# Patient Record
Sex: MALE | Race: WHITE | Employment: OTHER | URBAN - METROPOLITAN AREA
[De-identification: names, ages, dates, MRNs, and addresses within clinical notes are randomized per-mention and may not be internally consistent; named-entity substitution may affect disease eponyms.]

---

## 2018-08-27 ENCOUNTER — OFFICE VISIT (OUTPATIENT)
Dept: FAMILY MEDICINE | Facility: OTHER | Age: 68
End: 2018-08-27
Attending: NURSE PRACTITIONER
Payer: COMMERCIAL

## 2018-08-27 VITALS
BODY MASS INDEX: 30.15 KG/M2 | TEMPERATURE: 97.1 F | DIASTOLIC BLOOD PRESSURE: 70 MMHG | HEIGHT: 72 IN | WEIGHT: 222.6 LBS | SYSTOLIC BLOOD PRESSURE: 124 MMHG | RESPIRATION RATE: 16 BRPM | HEART RATE: 68 BPM | OXYGEN SATURATION: 97 %

## 2018-08-27 DIAGNOSIS — E78.5 HYPERLIPIDEMIA LDL GOAL <100: Primary | ICD-10-CM

## 2018-08-27 DIAGNOSIS — Z76.89 ENCOUNTER TO ESTABLISH CARE: ICD-10-CM

## 2018-08-27 DIAGNOSIS — K21.00 GASTROESOPHAGEAL REFLUX DISEASE WITH ESOPHAGITIS: ICD-10-CM

## 2018-08-27 DIAGNOSIS — F41.1 GENERALIZED ANXIETY DISORDER: ICD-10-CM

## 2018-08-27 DIAGNOSIS — Z79.899 ON STATIN THERAPY: ICD-10-CM

## 2018-08-27 PROCEDURE — 99203 OFFICE O/P NEW LOW 30 MIN: CPT | Performed by: NURSE PRACTITIONER

## 2018-08-27 RX ORDER — SIMVASTATIN 40 MG
TABLET ORAL
COMMUNITY
Start: 2018-06-14 | End: 2019-10-11

## 2018-08-27 RX ORDER — DIPHENOXYLATE HYDROCHLORIDE AND ATROPINE SULFATE 2.5; .025 MG/1; MG/1
TABLET ORAL
COMMUNITY
Start: 2005-06-10

## 2018-08-27 ASSESSMENT — PAIN SCALES - GENERAL: PAINLEVEL: NO PAIN (0)

## 2018-08-27 NOTE — PROGRESS NOTES
SUBJECTIVE:   Jim Brown is a 68 year old male who presents to clinic today for the following health issues:  Chief Complaint   Patient presents with     Establish Care     Jim presents today to [facility].  He had been following with [facility], his longtime provider retired; he has tried another and just was not comfortable.      He takes zocor for cholesterol, zantac at bedtime for GERD, aspirin and a multivit.      Consent obtained, [facility] medical record is reviewed and updated.  Labs are current, completed in May.      He is feeling well and does not have any new concerns today.       Problem list and histories reviewed & adjusted, as indicated.  Additional history: as documented    There is no problem list on file for this patient.    Past Surgical History:   Procedure Laterality Date     ABDOMEN SURGERY      hernia surgerys 3-4     CHOLECYSTECTOMY  2016    galbladder      ENT SURGERY      tonsilectomy when little        Social History   Substance Use Topics     Smoking status: Former Smoker     Packs/day: 1.00     Years: 20.00     Start date: 11/27/1971     Smokeless tobacco: Former User     Types: Chew     Alcohol use No     Family History   Problem Relation Age of Onset     Coronary Artery Disease Father      Other Cancer Brother          No current outpatient prescriptions on file.     Not on File  No lab results found.   BP Readings from Last 3 Encounters:   08/27/18 124/70    Wt Readings from Last 3 Encounters:   08/27/18 222 lb 9.6 oz (101 kg)                 Reviewed and updated as needed this visit by clinical staff  Tobacco  Allergies  Meds  Problems  Soc Hx      Reviewed and updated as needed this visit by Provider         ROS:  Constitutional, HEENT, cardiovascular, pulmonary, gi and gu systems are negative, except as otherwise noted.    OBJECTIVE:     /70 (BP Location: Right arm, Patient Position: Chair, Cuff Size: Adult Large)  Pulse 68  Temp 97.1  F (36.2  C) (Tympanic)  " Resp 16  Ht 5' 11.75\" (1.822 m)  Wt 222 lb 9.6 oz (101 kg)  SpO2 97%  BMI 30.4 kg/m2  Body mass index is 30.4 kg/(m^2).  GENERAL: healthy, alert and no distress  NECK: no adenopathy, no asymmetry, masses, or scars, thyroid normal to palpation and no carotid bruits  RESP: lungs clear to auscultation - no rales, rhonchi or wheezes  CV: regular rate and rhythm, normal S1 S2, no S3 or S4, no murmur, click or rub, no peripheral edema and peripheral pulses strong  MS: no gross musculoskeletal defects noted, no edema  PSYCH: mentation appears normal, affect normal/bright        ASSESSMENT/PLAN:       1. Encounter to establish care    2. Hyperlipidemia LDL goal <100  Labs are due in 3 months    3. On statin therapy    4. Gastroesophageal reflux disease with esophagitis    5. Generalized anxiety disorder        FUTURE APPOINTMENTS:       - Follow-up visit in 3 months or as needed for acute concerns    Izzy Wagner NP  [facility]    "

## 2018-08-27 NOTE — MR AVS SNAPSHOT
"              After Visit Summary   8/27/2018    Jim Brown    MRN: [id number]           Patient Information     Date Of Birth          1950        Visit Information        Provider Department      8/27/2018 9:00 AM Izzy Wagner NP [facility]        Today's Diagnoses     Hyperlipidemia LDL goal <100    -  1    Encounter to establish care        On statin therapy          Care Instructions      ASSESSMENT/PLAN:       1. Encounter to establish care    2. Hyperlipidemia LDL goal <100  Labs are due in 3 months    3. On statin therapy        FUTURE APPOINTMENTS:       - Follow-up visit in 3 months or as needed for acute concerns    Izzy Wagner NP  [facility]              Follow-ups after your visit        Follow-up notes from your care team     Return in about 3 months (around 11/27/2018), or if symptoms worsen or fail to improve.      Who to contact     If you have questions or need follow up information about today's clinic visit or your schedule please contact [facility] directly at [phone].  Normal or non-critical lab and imaging results will be communicated to you by GroupPricehart, letter or phone within 4 business days after the clinic has received the results. If you do not hear from us within 7 days, please contact the clinic through Screent or phone. If you have a critical or abnormal lab result, we will notify you by phone as soon as possible.  Submit refill requests through EnOcean or call your pharmacy and they will forward the refill request to us. Please allow 3 business days for your refill to be completed.          Additional Information About Your Visit        GroupPricehart Information     EnOcean lets you send messages to your doctor, view your test results, renew your prescriptions, schedule appointments and more. To sign up, go to [email or web address] . Click on \"Log in\" on the left side of the screen, which will take you to " "the Welcome page. Then click on \"Sign up Now\" on the right side of the page.     You will be asked to enter the access code listed below, as well as some personal information. Please follow the directions to create your username and password.     Your access code is: [email or web address]  Expires: 2018  8:45 AM     Your access code will  in 90 days. If you need help or a new code, please call your [place] clinic or [phone].        Care EveryWhere ID     This is your Care EveryWhere ID. This could be used by other organizations to access your [place] medical records  [id number]        Your Vitals Were     Pulse Temperature Respirations Height Pulse Oximetry BMI (Body Mass Index)    68 97.1  F (36.2  C) (Tympanic) 16 5' 11.75\" (1.822 m) 97% 30.4 kg/m2       Blood Pressure from Last 3 Encounters:   18 124/70    Weight from Last 3 Encounters:   18 222 lb 9.6 oz (101 kg)              Today, you had the following     No orders found for display       Primary Care Provider Fax #    Physician No Ref-Primary [phone]       No address on file        Equal Access to Services     VANNA PITTMAN : Hadii sanna Wagner, waaxda luryanadaha, qaybta kaalmada adeorvilleyada, amina spencer . So [facility] [phone].    ATENCIÓN: Si habla español, tiene a yost disposición servicios gratuitos de asistencia lingüística. Llame al [phone].    We comply with applicable federal civil rights laws and [place] laws. We do not discriminate on the basis of race, color, national origin, age, disability, sex, sexual orientation, or gender identity.            Thank you!     Thank you for choosing [facility]  for your care. Our goal is always to provide you with excellent care. Hearing back from our patients is one way we can continue to improve our services. Please take a few minutes to complete the written survey that you may receive in the mail after your visit with us. Thank " you!             Your Updated Medication List - Protect others around you: Learn how to safely use, store and throw away your medicines at www.disposemymeds.org.          This list is accurate as of 8/27/18  9:41 AM.  Always use your most recent med list.                   Brand Name Dispense Instructions for use Diagnosis    aspirin 81 MG EC tablet      Take 81 mg by mouth        MULTI-VITAMINS Tabs           ranitidine 300 MG tablet    ZANTAC     TAKE 1 TABLET EVERY EVENING. ACID REDUCER        simvastatin 40 MG tablet    ZOCOR     TAKE 1 TABLET BY MOUTH AT BEDTIME.

## 2018-08-27 NOTE — PATIENT INSTRUCTIONS
ASSESSMENT/PLAN:       1. Encounter to establish care    2. Hyperlipidemia LDL goal <100  Labs are due in 3 months    3. On statin therapy        FUTURE APPOINTMENTS:       - Follow-up visit in 3 months or as needed for acute concerns    Izzy Wagner NP  [facility]

## 2018-11-26 ENCOUNTER — OFFICE VISIT (OUTPATIENT)
Dept: FAMILY MEDICINE | Facility: OTHER | Age: 68
End: 2018-11-26
Attending: NURSE PRACTITIONER
Payer: COMMERCIAL

## 2018-11-26 VITALS
HEART RATE: 72 BPM | SYSTOLIC BLOOD PRESSURE: 114 MMHG | BODY MASS INDEX: 29.93 KG/M2 | OXYGEN SATURATION: 98 % | HEIGHT: 72 IN | DIASTOLIC BLOOD PRESSURE: 70 MMHG | RESPIRATION RATE: 16 BRPM | WEIGHT: 221 LBS

## 2018-11-26 DIAGNOSIS — Z12.11 ENCOUNTER FOR SCREENING COLONOSCOPY: ICD-10-CM

## 2018-11-26 DIAGNOSIS — Z13.6 ENCOUNTER FOR ABDOMINAL AORTIC ANEURYSM (AAA) SCREENING: ICD-10-CM

## 2018-11-26 DIAGNOSIS — Z79.899 ON STATIN THERAPY: ICD-10-CM

## 2018-11-26 DIAGNOSIS — K21.00 GASTROESOPHAGEAL REFLUX DISEASE WITH ESOPHAGITIS: ICD-10-CM

## 2018-11-26 DIAGNOSIS — L30.9 DERMATITIS: ICD-10-CM

## 2018-11-26 DIAGNOSIS — Z11.59 NEED FOR HEPATITIS C SCREENING TEST: ICD-10-CM

## 2018-11-26 DIAGNOSIS — E78.5 HYPERLIPIDEMIA LDL GOAL <100: Primary | ICD-10-CM

## 2018-11-26 LAB
ALBUMIN SERPL-MCNC: 3.8 G/DL (ref 3.4–5)
ALP SERPL-CCNC: 55 U/L (ref 40–150)
ALT SERPL W P-5'-P-CCNC: 17 U/L (ref 0–70)
ANION GAP SERPL CALCULATED.3IONS-SCNC: 7 MMOL/L (ref 3–14)
AST SERPL W P-5'-P-CCNC: 11 U/L (ref 0–45)
BILIRUB SERPL-MCNC: 0.3 MG/DL (ref 0.2–1.3)
BUN SERPL-MCNC: 14 MG/DL (ref 7–30)
CALCIUM SERPL-MCNC: 8.5 MG/DL (ref 8.5–10.1)
CHLORIDE SERPL-SCNC: 104 MMOL/L (ref 94–109)
CHOLEST SERPL-MCNC: 183 MG/DL
CO2 SERPL-SCNC: 26 MMOL/L (ref 20–32)
CREAT SERPL-MCNC: 0.85 MG/DL (ref 0.66–1.25)
GFR SERPL CREATININE-BSD FRML MDRD: 89 ML/MIN/1.7M2
GLUCOSE SERPL-MCNC: 66 MG/DL (ref 70–99)
HDLC SERPL-MCNC: 62 MG/DL
LDLC SERPL CALC-MCNC: 109 MG/DL
NONHDLC SERPL-MCNC: 121 MG/DL
POTASSIUM SERPL-SCNC: 3.9 MMOL/L (ref 3.4–5.3)
PROT SERPL-MCNC: 7.5 G/DL (ref 6.8–8.8)
SODIUM SERPL-SCNC: 137 MMOL/L (ref 133–144)
TRIGL SERPL-MCNC: 62 MG/DL
TSH SERPL DL<=0.005 MIU/L-ACNC: 1.72 MU/L (ref 0.4–4)

## 2018-11-26 PROCEDURE — 99214 OFFICE O/P EST MOD 30 MIN: CPT | Performed by: NURSE PRACTITIONER

## 2018-11-26 PROCEDURE — 84443 ASSAY THYROID STIM HORMONE: CPT | Performed by: NURSE PRACTITIONER

## 2018-11-26 PROCEDURE — 36415 COLL VENOUS BLD VENIPUNCTURE: CPT | Performed by: NURSE PRACTITIONER

## 2018-11-26 PROCEDURE — 80053 COMPREHEN METABOLIC PANEL: CPT | Performed by: NURSE PRACTITIONER

## 2018-11-26 PROCEDURE — 93000 ELECTROCARDIOGRAM COMPLETE: CPT | Performed by: INTERNAL MEDICINE

## 2018-11-26 PROCEDURE — 99000 SPECIMEN HANDLING OFFICE-LAB: CPT | Performed by: NURSE PRACTITIONER

## 2018-11-26 PROCEDURE — 86803 HEPATITIS C AB TEST: CPT | Mod: 90 | Performed by: NURSE PRACTITIONER

## 2018-11-26 PROCEDURE — 80061 LIPID PANEL: CPT | Performed by: NURSE PRACTITIONER

## 2018-11-26 RX ORDER — TRIAMCINOLONE ACETONIDE 1 MG/G
CREAM TOPICAL
Qty: 80 G | Refills: 0 | Status: SHIPPED | OUTPATIENT
Start: 2018-11-26 | End: 2020-08-25

## 2018-11-26 ASSESSMENT — ANXIETY QUESTIONNAIRES
1. FEELING NERVOUS, ANXIOUS, OR ON EDGE: NOT AT ALL
GAD7 TOTAL SCORE: 0
4. TROUBLE RELAXING: NOT AT ALL
3. WORRYING TOO MUCH ABOUT DIFFERENT THINGS: NOT AT ALL
7. FEELING AFRAID AS IF SOMETHING AWFUL MIGHT HAPPEN: NOT AT ALL
5. BEING SO RESTLESS THAT IT IS HARD TO SIT STILL: NOT AT ALL
2. NOT BEING ABLE TO STOP OR CONTROL WORRYING: NOT AT ALL
6. BECOMING EASILY ANNOYED OR IRRITABLE: NOT AT ALL
IF YOU CHECKED OFF ANY PROBLEMS ON THIS QUESTIONNAIRE, HOW DIFFICULT HAVE THESE PROBLEMS MADE IT FOR YOU TO DO YOUR WORK, TAKE CARE OF THINGS AT HOME, OR GET ALONG WITH OTHER PEOPLE: NOT DIFFICULT AT ALL

## 2018-11-26 ASSESSMENT — PAIN SCALES - GENERAL: PAINLEVEL: NO PAIN (0)

## 2018-11-26 ASSESSMENT — PATIENT HEALTH QUESTIONNAIRE - PHQ9: SUM OF ALL RESPONSES TO PHQ QUESTIONS 1-9: 8

## 2018-11-26 NOTE — PATIENT INSTRUCTIONS
ASSESSMENT/PLAN:       1. Hyperlipidemia LDL goal <100  chronic  - Lipid Profile  - TSH with free T4 reflex  - Comprehensive metabolic panel    2. Gastroesophageal reflux disease with esophagitis  Continue current plan    3. Need for hepatitis C screening test  routine  - Hepatitis C Screen Reflex to HCV RNA Quant and Genotype    4. Encounter for screening colonoscopy  routine  - GENERAL SURG ADULT REFERRAL  - EKG 12-lead complete w/read - (Clinic Performed)    5. On statin therapy  - Comprehensive metabolic panel    6. Encounter for abdominal aortic aneurysm (AAA) screening  routine  - [place] Aorta Medicare AAA Screening; Future    7. Dermatitis  - triamcinolone (KENALOG) 0.1 % cream; Apply sparingly to affected area three times daily as needed  Dispense: 80 g; Refill: 0    shingrix updated today      FUTURE APPOINTMENTS:       - Follow-up visit in 6 months or as needed for acute concerns.     Izzy Wagner NP  [facility]

## 2018-11-26 NOTE — MR AVS SNAPSHOT
After Visit Summary   11/26/2018    Jim Brown    MRN: [id number]           Patient Information     Date Of Birth          1950        Visit Information        Provider Department      11/26/2018 10:15 AM Izzy Wagner NP [facility]        Today's Diagnoses     Hyperlipidemia LDL goal <100    -  1    Gastroesophageal reflux disease with esophagitis        Need for hepatitis C screening test        Encounter for screening colonoscopy        On statin therapy        Encounter for abdominal aortic aneurysm (AAA) screening        Dermatitis          Care Instructions      ASSESSMENT/PLAN:       1. Hyperlipidemia LDL goal <100  chronic  - Lipid Profile  - TSH with free T4 reflex  - Comprehensive metabolic panel    2. Gastroesophageal reflux disease with esophagitis  Continue current plan    3. Need for hepatitis C screening test  routine  - Hepatitis C Screen Reflex to HCV RNA Quant and Genotype    4. Encounter for screening colonoscopy  routine  - GENERAL SURG ADULT REFERRAL  - EKG 12-lead complete w/read - (Clinic Performed)    5. On statin therapy  - Comprehensive metabolic panel    6. Encounter for abdominal aortic aneurysm (AAA) screening  routine  - US Aorta Medicare AAA Screening; Future    7. Dermatitis  - triamcinolone (KENALOG) 0.1 % cream; Apply sparingly to affected area three times daily as needed  Dispense: 80 g; Refill: 0    shingrix updated today      FUTURE APPOINTMENTS:       - Follow-up visit in 6 months or as needed for acute concerns.     Izzy Wagner NP  [facility]          Follow-ups after your visit        Additional Services     GENERAL SURG ADULT REFERRAL       Your provider has referred you to: [facility]    Please be aware that coverage of these services is subject to the terms and limitations of your health insurance plan.  Call member services at your health plan with any benefit or coverage  questions.      Please bring the following with you to your appointment:    (1) Any X-Rays, CTs or MRIs which have been performed.  Contact the facility where they were done to arrange for  prior to your scheduled appointment.   (2) List of current medications   (3) This referral request   (4) Any documents/labs given to you for this referral                  Follow-up notes from your care team     Return in about 6 months (around 5/26/2019), or if symptoms worsen or fail to improve.      Your next 10 appointments already scheduled     May 24, 2019  8:30 AM CDT   (Arrive by 8:15 AM)   SHORT with Izzy Wagner NP   [facility] ([facility] )    [address]  [facility] [address]   [phone]              Future tests that were ordered for you today     Open Future Orders        Priority Expected Expires Ordered    US Aorta Medicare AAA Screening Routine  11/26/2019 11/26/2018            Who to contact     If you have questions or need follow up information about today's clinic visit or your schedule please contact [facility] directly at [phone].  Normal or non-critical lab and imaging results will be communicated to you by MyChart, letter or phone within 4 business days after the clinic has received the results. If you do not hear from us within 7 days, please contact the clinic through MyChart or phone. If you have a critical or abnormal lab result, we will notify you by phone as soon as possible.  Submit refill requests through ScoreGrid or call your pharmacy and they will forward the refill request to us. Please allow 3 business days for your refill to be completed.          Additional Information About Your Visit        MyChart Information     ScoreGrid lets you send messages to your doctor, view your test results, renew your prescriptions, schedule appointments and more. To sign up, go to  [email or web address] . Click on \"Log in\" on the left side of the screen, which will take you to the Welcome page. Then click on \"Sign up Now\" on the right side of the page.     You will be asked to enter the access code listed below, as well as some personal information. Please follow the directions to create your username and password.     Your access code is: [email or web address]  Expires: 2019  8:17 AM     Your access code will  in 90 days. If you need help or a new code, please call your [place] clinic or [phone].        Care EveryWhere ID     This is your Care EveryWhere ID. This could be used by other organizations to access your [place] medical records  [id number]        Your Vitals Were     Pulse Respirations Height Pulse Oximetry BMI (Body Mass Index)       72 16 5' 11.75\" (1.822 m) 98% 30.18 kg/m2        Blood Pressure from Last 3 Encounters:   18 114/70   18 124/70    Weight from Last 3 Encounters:   18 221 lb (100.2 kg)   18 222 lb 9.6 oz (101 kg)              We Performed the Following     Comprehensive metabolic panel     EKG 12-lead complete w/read - (Clinic Performed)     GENERAL SURG ADULT REFERRAL     Hepatitis C Screen Reflex to HCV RNA Quant and Genotype     Lipid Profile     TSH with free T4 reflex          Today's Medication Changes          These changes are accurate as of 18 11:05 AM.  If you have any questions, ask your nurse or doctor.               Start taking these medicines.        Dose/Directions    triamcinolone 0.1 % cream   Commonly known as:  KENALOG   Used for:  Dermatitis   Started by:  Izzy Wagner NP        Apply sparingly to affected area three times daily as needed   Quantity:  80 g   Refills:  0            Where to get your medicines      These medications were sent to [facility] - [place], [place] - [address]  [address] [address]     Phone:  [phone]     triamcinolone 0.1 % cream                Primary " Care Provider Office Phone # Fax #    Izzy SORIA JUMANA Wagner [phone] [address] [facility] [address]        Equal Access to Services     AL[place] PITTMAN : Hadii aad ku hadalizao Soomaali, waaxda luqadaha, qaybta kaalmada adeegyada, amina martinezn tamiaorville shawrosario swann. So [facility] [phone].    ATENCIÓN: Si habla español, tiene a yost disposición servicios gratuitos de asistencia lingüística. Llame al [phone].    We comply with applicable federal civil rights laws and [place] laws. We do not discriminate on the basis of race, color, national origin, age, disability, sex, sexual orientation, or gender identity.            Thank you!     Thank you for choosing [facility]  for your care. Our goal is always to provide you with excellent care. Hearing back from our patients is one way we can continue to improve our services. Please take a few minutes to complete the written survey that you may receive in the mail after your visit with us. Thank you!             Your Updated Medication List - Protect others around you: Learn how to safely use, store and throw away your medicines at www.disposemymeds.org.          This list is accurate as of 11/26/18 11:05 AM.  Always use your most recent med list.                   Brand Name Dispense Instructions for use Diagnosis    aspirin 81 MG EC tablet    ASA     Take 81 mg by mouth        MULTI-VITAMINS Tabs           ranitidine 300 MG tablet    ZANTAC     TAKE 1 TABLET EVERY EVENING. ACID REDUCER        simvastatin 40 MG tablet    ZOCOR     TAKE 1 TABLET BY MOUTH AT BEDTIME.        triamcinolone 0.1 % cream    KENALOG    80 g    Apply sparingly to affected area three times daily as needed    Dermatitis

## 2018-11-26 NOTE — NURSING NOTE
"Chief Complaint   Patient presents with     Lipids       Initial /70 (BP Location: Left arm, Patient Position: Chair, Cuff Size: Adult Large)  Pulse 72  Resp 16  Ht 5' 11.75\" (1.822 m)  Wt 221 lb (100.2 kg)  SpO2 98%  BMI 30.18 kg/m2 Estimated body mass index is 30.18 kg/(m^2) as calculated from the following:    Height as of this encounter: 5' 11.75\" (1.822 m).    Weight as of this encounter: 221 lb (100.2 kg).  Medication Reconciliation: complete    Pamela M. Lechevalier, LPN    "

## 2018-11-27 ENCOUNTER — HOSPITAL ENCOUNTER (OUTPATIENT)
Dept: ULTRASOUND IMAGING | Facility: HOSPITAL | Age: 68
Discharge: HOME | End: 2018-11-27
Attending: NURSE PRACTITIONER | Admitting: NURSE PRACTITIONER
Payer: COMMERCIAL

## 2018-11-27 DIAGNOSIS — Z13.6 ENCOUNTER FOR ABDOMINAL AORTIC ANEURYSM (AAA) SCREENING: ICD-10-CM

## 2018-11-27 LAB — HCV AB SERPL QL IA: NONREACTIVE

## 2018-11-27 PROCEDURE — 76706 US ABDL AORTA SCREEN AAA: CPT | Mod: TC

## 2018-11-27 ASSESSMENT — ANXIETY QUESTIONNAIRES: GAD7 TOTAL SCORE: 0

## 2018-11-28 ENCOUNTER — TELEPHONE (OUTPATIENT)
Dept: SURGERY | Facility: OTHER | Age: 68
End: 2018-11-28

## 2018-11-28 DIAGNOSIS — Z12.11 ENCOUNTER FOR SCREENING COLONOSCOPY: Primary | ICD-10-CM

## 2018-11-28 RX ORDER — SODIUM, POTASSIUM,MAG SULFATES 17.5-3.13G
2 SOLUTION, RECONSTITUTED, ORAL ORAL SEE ADMIN INSTRUCTIONS
Qty: 2 BOTTLE | Refills: 0 | Status: SHIPPED | OUTPATIENT
Start: 2018-11-28 | End: 2019-02-20

## 2018-11-28 NOTE — TELEPHONE ENCOUNTER
Patient contacted regarding a referral sent by Sami Randall for a meet and greet colonoscopy.  Patient has chosen December 6, 2018 as the date for their meet and greet colonoscopy with Dr Caceres at [facility].    All information regarding the bowel prep, same day surgery and our [facility] Handbook has been sent to the patient via mail.  Numbers to the surgery department have been provided to the patient in case questions should arise or a date needs to be rescheduled.    HEENA BRIGHT

## 2018-12-06 ENCOUNTER — OFFICE VISIT (OUTPATIENT)
Dept: ANESTHESIOLOGY | Facility: HOSPITAL | Age: 68
End: 2018-12-06
Attending: SURGERY
Payer: COMMERCIAL

## 2018-12-06 PROCEDURE — 00812 ANES LWR INTST SCR COLSC: CPT | Mod: QZ | Performed by: NURSE ANESTHETIST, CERTIFIED REGISTERED

## 2018-12-06 PROCEDURE — G0121 COLON CA SCRN NOT HI RSK IND: HCPCS | Performed by: SURGERY

## 2019-02-19 NOTE — PROGRESS NOTES
"  SUBJECTIVE:   Jim Brown is a 68 year old male who presents to clinic today for the following health issues:      Dizziness and noting \"a funny smell\" then feels \"foggy\".  And \"spacy\".then feels he is \"coming out of it\", smell passes and he feels normal.          Duration: Started in January    Description (location/character/radiation): Patient reports having a funny smell and then almost passing out. He reports when he comes around he feels like he is coming out of a deep sleep.    Intensity:  moderate    Accompanying signs and symptoms: none    History (similar episodes/previous evaluation): None    Precipitating or alleviating factors: None    Therapies tried and outcome: None       This occurred frequently in January, multiple times over the past weekend, and he is concerned.  No SOB, no chest pain, no headaches, no seizure history.    No sinus infections, no nasal drainage.   No vision change    Is active, snow-shoes frequently  No falls or head injuries      Problem list and histories reviewed & adjusted, as indicated.  Additional history: as documented    Patient Active Problem List   Diagnosis     Hyperlipidemia LDL goal <100     Generalized anxiety disorder     Gastroesophageal reflux disease with esophagitis     Benign prostatic hyperplasia with urinary obstruction     Acute pancreatitis     Agoraphobia with panic disorder     Compression deformity of vertebra     Pericardial effusion     Personality disorder (H)     Pure hypercholesterolemia     Unilateral inguinal hernia     Past Surgical History:   Procedure Laterality Date     ABDOMEN SURGERY      hernia surgerys 3-4     CHOLECYSTECTOMY  2016    galbladder      COLONOSCOPY  12/06/2018    [facility], negative for polyps 10 year      ENT SURGERY      tonsilectomy when little        Social History     Tobacco Use     Smoking status: Former Smoker     Packs/day: 1.00     Years: 20.00     Pack years: 20.00     Start date: 11/27/1971     Smokeless " "tobacco: Former User     Types: Chew   Substance Use Topics     Alcohol use: No     Family History   Problem Relation Age of Onset     Coronary Artery Disease Father      Other Cancer Brother          Current Outpatient Medications   Medication Sig Dispense Refill     aspirin 81 MG EC tablet Take 81 mg by mouth       Multiple Vitamin (MULTI-VITAMINS) TABS        ranitidine (ZANTAC) 300 MG tablet TAKE 1 TABLET EVERY EVENING. ACID REDUCER       simvastatin (ZOCOR) 40 MG tablet TAKE 1 TABLET BY MOUTH AT BEDTIME.       triamcinolone (KENALOG) 0.1 % cream Apply sparingly to affected area three times daily as needed 80 g 0     Not on File  Recent Labs   Lab Test 11/26/18  1059   *   HDL 62   TRIG 62   ALT 17   CR 0.85   GFRESTIMATED 89   GFRESTBLACK >90   POTASSIUM 3.9   TSH 1.72      BP Readings from Last 3 Encounters:   02/20/19 116/64   11/26/18 114/70   08/27/18 124/70    Wt Readings from Last 3 Encounters:   02/20/19 103.9 kg (229 lb)   11/26/18 100.2 kg (221 lb)   08/27/18 101 kg (222 lb 9.6 oz)                  Labs reviewed in EPIC    Reviewed and updated as needed this visit by clinical staff  Tobacco  Allergies  Meds       Reviewed and updated as needed this visit by Provider         ROS:  Constitutional, HEENT, cardiovascular, pulmonary, GI, , musculoskeletal, neuro, skin, endocrine and psych systems are negative, except as otherwise noted.      OBJECTIVE:     /64 (BP Location: Left arm, Patient Position: Sitting, Cuff Size: Adult Large)   Pulse 82   Temp 97.3  F (36.3  C) (Tympanic)   Resp 14   Ht 1.822 m (5' 11.75\")   Wt 103.9 kg (229 lb)   SpO2 97%   BMI 31.27 kg/m    Body mass index is 31.27 kg/m .     GENERAL: healthy, alert and no distress  EYES: Eyes grossly normal to inspection, PERRL and conjunctivae and sclerae normal  HENT: both ears: clear effusion and nasal mucosa edematous, erythemaouts, no drainage  RESP: lungs clear to auscultation - no rales, rhonchi or wheezes  CV: " regular rate and rhythm, normal S1 S2, no S3 or S4, no murmur, click or rub, no peripheral edema and peripheral pulses strong  SKIN: no suspicious lesions or rashes  NEURO: Normal strength and tone, mentation intact and speech normal  PSYCH: mentation appears normal, affect normal/bright      I spoke with Dr Pickard about this interesting case  Differential diagnosis includes atypical migraine with olfactory aura, seizure disorder with olfactory aura, or olfactory aura unspecified.    He kindly agrees to see him for a consultation.       ASSESSMENT/PLAN:     1. Dizziness  - CBC with platelets differential  - INTERNAL MEDICINE REFERRAL    2. Olfactory aura  - INTERNAL MEDICINE REFERRAL    Caution with activity  To ER with acute concerns          Marielos Holman NP  [facility]

## 2019-02-20 ENCOUNTER — OFFICE VISIT (OUTPATIENT)
Dept: FAMILY MEDICINE | Facility: OTHER | Age: 69
End: 2019-02-20
Attending: NURSE PRACTITIONER
Payer: COMMERCIAL

## 2019-02-20 VITALS
DIASTOLIC BLOOD PRESSURE: 64 MMHG | RESPIRATION RATE: 14 BRPM | HEART RATE: 82 BPM | BODY MASS INDEX: 31.02 KG/M2 | WEIGHT: 229 LBS | TEMPERATURE: 97.3 F | HEIGHT: 72 IN | SYSTOLIC BLOOD PRESSURE: 116 MMHG | OXYGEN SATURATION: 97 %

## 2019-02-20 DIAGNOSIS — R42 DIZZINESS: Primary | ICD-10-CM

## 2019-02-20 DIAGNOSIS — R43.1 OLFACTORY AURA: ICD-10-CM

## 2019-02-20 LAB
BASOPHILS # BLD AUTO: 0 10E9/L (ref 0–0.2)
BASOPHILS NFR BLD AUTO: 0.2 %
DIFFERENTIAL METHOD BLD: NORMAL
EOSINOPHIL # BLD AUTO: 0.1 10E9/L (ref 0–0.7)
EOSINOPHIL NFR BLD AUTO: 1.4 %
ERYTHROCYTE [DISTWIDTH] IN BLOOD BY AUTOMATED COUNT: 13.1 % (ref 10–15)
HCT VFR BLD AUTO: 43.4 % (ref 40–53)
HGB BLD-MCNC: 14.5 G/DL (ref 13.3–17.7)
LYMPHOCYTES # BLD AUTO: 2 10E9/L (ref 0.8–5.3)
LYMPHOCYTES NFR BLD AUTO: 34.4 %
MCH RBC QN AUTO: 31.3 PG (ref 26.5–33)
MCHC RBC AUTO-ENTMCNC: 33.4 G/DL (ref 31.5–36.5)
MCV RBC AUTO: 94 FL (ref 78–100)
MONOCYTES # BLD AUTO: 0.6 10E9/L (ref 0–1.3)
MONOCYTES NFR BLD AUTO: 9.8 %
NEUTROPHILS # BLD AUTO: 3.2 10E9/L (ref 1.6–8.3)
NEUTROPHILS NFR BLD AUTO: 54.2 %
PLATELET # BLD AUTO: 234 10E9/L (ref 150–450)
RBC # BLD AUTO: 4.64 10E12/L (ref 4.4–5.9)
WBC # BLD AUTO: 5.8 10E9/L (ref 4–11)

## 2019-02-20 PROCEDURE — 99214 OFFICE O/P EST MOD 30 MIN: CPT | Performed by: NURSE PRACTITIONER

## 2019-02-20 PROCEDURE — 36415 COLL VENOUS BLD VENIPUNCTURE: CPT | Performed by: NURSE PRACTITIONER

## 2019-02-20 PROCEDURE — 85025 COMPLETE CBC W/AUTO DIFF WBC: CPT | Performed by: NURSE PRACTITIONER

## 2019-02-20 ASSESSMENT — MIFFLIN-ST. JEOR: SCORE: 1842.77

## 2019-02-20 ASSESSMENT — PAIN SCALES - GENERAL: PAINLEVEL: NO PAIN (0)

## 2019-02-20 NOTE — Clinical Note
This is the patient I talked with you about - olfactory aura, dizziness, change of mentation after aura

## 2019-02-20 NOTE — PATIENT INSTRUCTIONS
ASSESSMENT/PLAN:     1. Dizziness  - CBC with platelets differential  - INTERNAL MEDICINE REFERRAL    2. Olfactory aura  - INTERNAL MEDICINE REFERRAL    Caution with activity  To ER with acute concerns          Marielos Holman NP  [facility]

## 2019-02-20 NOTE — NURSING NOTE
"Chief Complaint   Patient presents with     Dizziness       Initial /64 (BP Location: Left arm, Patient Position: Sitting, Cuff Size: Adult Large)   Pulse 82   Temp 97.3  F (36.3  C) (Tympanic)   Resp 14   Ht 1.822 m (5' 11.75\")   Wt 103.9 kg (229 lb)   SpO2 97%   BMI 31.27 kg/m   Estimated body mass index is 31.27 kg/m  as calculated from the following:    Height as of this encounter: 1.822 m (5' 11.75\").    Weight as of this encounter: 103.9 kg (229 lb).  Medication Reconciliation: complete    Shirlene Tony LPN    "

## 2019-02-25 ENCOUNTER — OFFICE VISIT (OUTPATIENT)
Dept: INTERNAL MEDICINE | Facility: OTHER | Age: 69
End: 2019-02-25
Attending: INTERNAL MEDICINE
Payer: COMMERCIAL

## 2019-02-25 VITALS
SYSTOLIC BLOOD PRESSURE: 110 MMHG | BODY MASS INDEX: 31.27 KG/M2 | OXYGEN SATURATION: 96 % | TEMPERATURE: 96.1 F | WEIGHT: 229 LBS | HEART RATE: 74 BPM | DIASTOLIC BLOOD PRESSURE: 80 MMHG

## 2019-02-25 DIAGNOSIS — R42 DIZZINESS: ICD-10-CM

## 2019-02-25 DIAGNOSIS — R43.1 OLFACTORY AURA: ICD-10-CM

## 2019-02-25 PROCEDURE — 99244 OFF/OP CNSLTJ NEW/EST MOD 40: CPT | Performed by: INTERNAL MEDICINE

## 2019-02-25 ASSESSMENT — ANXIETY QUESTIONNAIRES
5. BEING SO RESTLESS THAT IT IS HARD TO SIT STILL: NOT AT ALL
6. BECOMING EASILY ANNOYED OR IRRITABLE: NOT AT ALL
3. WORRYING TOO MUCH ABOUT DIFFERENT THINGS: NOT AT ALL
7. FEELING AFRAID AS IF SOMETHING AWFUL MIGHT HAPPEN: NOT AT ALL
GAD7 TOTAL SCORE: 0
4. TROUBLE RELAXING: NOT AT ALL
1. FEELING NERVOUS, ANXIOUS, OR ON EDGE: NOT AT ALL
2. NOT BEING ABLE TO STOP OR CONTROL WORRYING: NOT AT ALL

## 2019-02-25 ASSESSMENT — PATIENT HEALTH QUESTIONNAIRE - PHQ9: SUM OF ALL RESPONSES TO PHQ QUESTIONS 1-9: 0

## 2019-02-25 ASSESSMENT — PAIN SCALES - GENERAL: PAINLEVEL: NO PAIN (0)

## 2019-02-25 NOTE — NURSING NOTE
"Chief Complaint   Patient presents with     RECHECK     Dizziness       Initial /80   Pulse 74   Temp 96.1  F (35.6  C) (Tympanic)   Wt 103.9 kg (229 lb)   SpO2 96%   BMI 31.27 kg/m   Estimated body mass index is 31.27 kg/m  as calculated from the following:    Height as of 2/20/19: 1.822 m (5' 11.75\").    Weight as of this encounter: 103.9 kg (229 lb).  Medication Reconciliation: complete    Cassidy Donis LPN  "

## 2019-02-25 NOTE — PROGRESS NOTES
"  SUBJECTIVE:   Jim Brown is a 68 year old male who presents to clinic today for the following health issues:      Dizziness      Duration: chronic    Description   Feeling faint:  no   Feeling like the surroundings are moving: YES  Loss of consciousness or falls: no     Intensity:  mild    Accompanying signs and symptoms:   Nausea/vomitting: no   Palpitations: no   Weakness in arms or legs: no   Vision or speech changes: no   Ringing in ears (Tinnitus): no   Hearing loss related to dizziness: no   Other (fevers/chills/sweating/dyspnea): no     History (similar episodes/head trauma/previous evaluation/recent bleeding): None    Precipitating or alleviating factors (new meds/chemicals): unknown medication   Worse with activity/head movement: no     Therapies tried and outcome: unknown medication name - did not help     auora      Duration: January 2019 (had the flu)     Description (location/character/radiation): feels he smells something     Intensity:  n/a    Accompanying signs and symptoms: feels he is going to pass out- about a min later he feels like he is waking up- but never really passed out or anything- \"my heart beats different beats at different times\"    History (similar episodes/previous evaluation): stress tests and EKG's - at [facility]- never saw a cardiologist     Precipitating or alleviating factors: None    Therapies tried and outcome: None     Jim presents today in consultation from Marielos Holman NP due to what he describes as episodes.  He reports these events started in January after he had an illness.  He describes episodes where he has an aura of a smell(which he cannot characterize) followed by the sensation he is going to pass out.  He is uncertain how long these episodes last.  He denies any syncope from them.  He reports his last episode(s) were over 1 week ago on Saturday when he was in [place].  From that time he denies any recurrent episodes.  He denies any weakness, chest pain or SOB " associated with these episodes.  He has never experienced anything like this prior.  He does report a longstanding history of Vertigo but he states this is not vertigo and is something much different.    Problem list and histories reviewed & adjusted, as indicated.  Additional history: as documented    Patient Active Problem List   Diagnosis     Hyperlipidemia LDL goal <100     Generalized anxiety disorder     Gastroesophageal reflux disease with esophagitis     Benign prostatic hyperplasia with urinary obstruction     Acute pancreatitis     Agoraphobia with panic disorder     Compression deformity of vertebra     Pericardial effusion     Personality disorder (H)     Pure hypercholesterolemia     Unilateral inguinal hernia     Past Surgical History:   Procedure Laterality Date     ABDOMEN SURGERY      hernia surgerys 3-4     CHOLECYSTECTOMY  2016    galbladder      COLONOSCOPY  12/06/2018    [facility], negative for polyps 10 year      ENT SURGERY      tonsilectomy when little        Social History     Tobacco Use     Smoking status: Former Smoker     Packs/day: 1.00     Years: 20.00     Pack years: 20.00     Start date: 11/27/1971     Smokeless tobacco: Former User     Types: Chew   Substance Use Topics     Alcohol use: No     Family History   Problem Relation Age of Onset     Coronary Artery Disease Father      Other Cancer Brother          Current Outpatient Medications   Medication Sig Dispense Refill     aspirin 81 MG EC tablet Take 81 mg by mouth       Multiple Vitamin (MULTI-VITAMINS) TABS        ranitidine (ZANTAC) 300 MG tablet TAKE 1 TABLET EVERY EVENING. ACID REDUCER       simvastatin (ZOCOR) 40 MG tablet TAKE 1 TABLET BY MOUTH AT BEDTIME.       triamcinolone (KENALOG) 0.1 % cream Apply sparingly to affected area three times daily as needed 80 g 0     Not on File  BP Readings from Last 3 Encounters:   02/25/19 110/80   02/20/19 116/64   11/26/18 114/70    Wt Readings from Last 3 Encounters:   02/25/19  103.9 kg (229 lb)   02/20/19 103.9 kg (229 lb)   11/26/18 100.2 kg (221 lb)           Reviewed and updated as needed this visit by clinical staff       Reviewed and updated as needed this visit by Provider       ROS:  Constitutional, HEENT, cardiovascular, pulmonary, GI, , musculoskeletal, neuro, skin, endocrine and psych systems are negative, except as otherwise noted.    OBJECTIVE:     /80   Pulse 74   Temp 96.1  F (35.6  C) (Tympanic)   Wt 103.9 kg (229 lb)   SpO2 96%   BMI 31.27 kg/m    Body mass index is 31.27 kg/m .   GENERAL: Alert and no distress  EYES: Eyes grossly normal to inspection, PERRL and conjunctivae and sclerae normal  HENT: ear canals and TM's normal, nose and mouth without ulcers or lesions  RESP: lungs clear to auscultation - no rales, rhonchi or wheezes  CV: regular rate and rhythm, normal S1 S2, no S3 or S4, no murmur, click or rub, no peripheral edema and peripheral pulses strong  MS: no gross musculoskeletal defects noted, no edema  SKIN: no suspicious lesions or rashes  NEURO: CN 2-12 intact.  No lateralizing deficits noted.    PSYCH: mentation appears normal, affect normal/bright    Diagnostic Test Results:  No results found for this or any previous visit (from the past 24 hour(s)).  Results for orders placed or performed in visit on 02/20/19   CBC with platelets differential   Result Value Ref Range    WBC 5.8 4.0 - 11.0 10e9/L    RBC Count 4.64 4.4 - 5.9 10e12/L    Hemoglobin 14.5 13.3 - 17.7 g/dL    Hematocrit 43.4 40.0 - 53.0 %    MCV 94 78 - 100 fl    MCH 31.3 26.5 - 33.0 pg    MCHC 33.4 31.5 - 36.5 g/dL    RDW 13.1 10.0 - 15.0 %    Platelet Count 234 150 - 450 10e9/L    % Neutrophils 54.2 %    % Lymphocytes 34.4 %    % Monocytes 9.8 %    % Eosinophils 1.4 %    % Basophils 0.2 %    Absolute Neutrophil 3.2 1.6 - 8.3 10e9/L    Absolute Lymphocytes 2.0 0.8 - 5.3 10e9/L    Absolute Monocytes 0.6 0.0 - 1.3 10e9/L    Absolute Eosinophils 0.1 0.0 - 0.7 10e9/L    Absolute  Basophils 0.0 0.0 - 0.2 10e9/L    Diff Method Automated Method        ASSESSMENT/PLAN:     Problem List Items Addressed This Visit     None      Visit Diagnoses     Dizziness        Olfactory aura        Relevant Orders    MR Brain w/o Contrast         The etiology of these episodes remain unclear.  Differential include atypical Migraine versus Atypical or absence seizure.  Less likely causes include CVA or CNS malignancy.  We will obtain MRI then determine if he would benefit from Neurology referral.      Burke Pickard DO  [facility]    CC: Marielos Wagner NP

## 2019-02-26 ENCOUNTER — HOSPITAL ENCOUNTER (OUTPATIENT)
Dept: MRI IMAGING | Facility: HOSPITAL | Age: 69
Discharge: HOME | End: 2019-02-26
Attending: INTERNAL MEDICINE | Admitting: INTERNAL MEDICINE
Payer: COMMERCIAL

## 2019-02-26 DIAGNOSIS — R43.1 OLFACTORY AURA: ICD-10-CM

## 2019-02-26 PROCEDURE — 70553 MRI BRAIN STEM W/O & W/DYE: CPT | Mod: TC

## 2019-02-26 PROCEDURE — A9585 GADOBUTROL INJECTION: HCPCS | Performed by: RADIOLOGY

## 2019-02-26 PROCEDURE — 25500064 ZZH RX 255 OP 636: Performed by: RADIOLOGY

## 2019-02-26 RX ORDER — GADOBUTROL 604.72 MG/ML
10 INJECTION INTRAVENOUS ONCE
Status: COMPLETED | OUTPATIENT
Start: 2019-02-26 | End: 2019-02-26

## 2019-02-26 RX ADMIN — GADOBUTROL 10 ML: 604.72 INJECTION INTRAVENOUS at 08:44

## 2019-02-26 ASSESSMENT — ANXIETY QUESTIONNAIRES: GAD7 TOTAL SCORE: 0

## 2019-05-08 NOTE — PROGRESS NOTES
SUBJECTIVE:   Jim Brown is a 68 year old male who presents to clinic today for the following health issues:  Chief Complaint   Patient presents with     Lipids       Hyperlipidemia Follow-Up      Rate your low fat/cholesterol diet?: good    Taking statin?  Yes, no muscle aches from statin    Other lipid medications/supplements?:  none      Amount of exercise or physical activity: 6-7 days/week for an average of 15-30 minutes    Problems taking medications regularly: No    Medication side effects: none    Diet: regular (no restrictions)  The ASCVD Risk score ([place] ACOSTA Jr, et al., 2013) failed to calculate for the following reasons:    Cannot find a previous HDL lab      Cannot find a previous total cholesterol lab          Anxiety:  He was on medications in the past, is not interested in restarting. Feels he is doing well and mood is stable.   PHQ-9 SCORE 11/26/2018   Total Score 8     RAMO-7 SCORE 11/26/2018   Total Score 0     PSA was completed in may 2018 at [facility] - result normal at 0.97.      GERD:  Takes zantac as needed, well controlled with current plan.     Due for colonoscopy.        Problem list and histories reviewed & adjusted, as indicated.  Additional history: as documented    Patient Active Problem List   Diagnosis     Hyperlipidemia LDL goal <100     Generalized anxiety disorder     Gastroesophageal reflux disease with esophagitis     Benign prostatic hyperplasia with urinary obstruction     Past Surgical History:   Procedure Laterality Date     ABDOMEN SURGERY      hernia surgerys 3-4     CHOLECYSTECTOMY  2016    galbladder      ENT SURGERY      tonsilectomy when little        Social History   Substance Use Topics     Smoking status: Former Smoker     Packs/day: 1.00     Years: 20.00     Start date: 11/27/1971     Smokeless tobacco: Former User     Types: Chew     Alcohol use No     Family History   Problem Relation Age of Onset     Coronary Artery Disease Father      Other Cancer Brother   Hospitalist Progress Note NAME: Romelia Olvera :  1941 MRN:  [id number] Assessment / Plan: 
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 
prior L cerebellar CVA POA 
+blurred vision, difficulty walking with bilateral leg weakness and slower speech CT head with chronic infarctions. No acute findings. MRI brain with large acute infarct of the inferior left cerebellum MRA brain with no flow demonstrated within the right vertebral artery and local stenosis flow is noted within the left vertebral artery. No flow is demonstrated within 
the basilar artery. 11 mm irregular aneurysm at the left posterior communicating artery. Fusiform dilatation of the left cavernous carotid. Marked narrowing of the right cavernous carotid with fusiform dilatation of the distal right internal carotid artery. Multifocal areas of stenosis in the middle cerebral arteries bilaterally. Tele NSR with PVCs, no clear atrial fib Echo LVEF 51-55%, normal RV size ,  Con't pravachol, dose increased. Change aggrenox to BID per neuro recs Neuro consult appreciated, sinemet dose adjusted per their recommendations Spoke with Dr Jackie Guerrier Check CTA head and neck, d/w Dr Jackie Guerrier, feels it is important Risks of dye vs benefits of assessing severity of aneurysm d/w pt and family Aneurysm and rupture risk d/w family IVF  
PT/OT recommending SAH, likely in AM 
 
Essential HTN and CAD POA normotensive Norvasc Metoprolol at decreased dose Hyperglycemia: no h/o DM HgA1c 5.9 Presumed CKD stage 3 POA Dementia with anxiety, depression: con't celexa 
  
Code Status: Full Surrogate Decision Maker: wife  
 
DVT Prophylaxis: heparin 
  
Baseline: ambulates with cane, lives at home with wife and duaghter Subjective: "        Current Outpatient Prescriptions   Medication Sig Dispense Refill     aspirin 81 MG EC tablet Take 81 mg by mouth       Multiple Vitamin (MULTI-VITAMINS) TABS        ranitidine (ZANTAC) 300 MG tablet TAKE 1 TABLET EVERY EVENING. ACID REDUCER       simvastatin (ZOCOR) 40 MG tablet TAKE 1 TABLET BY MOUTH AT BEDTIME.       Not on File  No lab results found.   BP Readings from Last 3 Encounters:   11/26/18 114/70   08/27/18 124/70    Wt Readings from Last 3 Encounters:   11/26/18 221 lb (100.2 kg)   08/27/18 222 lb 9.6 oz (101 kg)                    Reviewed and updated as needed this visit by clinical staff       Reviewed and updated as needed this visit by Provider         ROS:  Constitutional, HEENT, cardiovascular, pulmonary, gi and gu systems are negative, except as otherwise noted.  Notes itchy lesion of left axilla, present for years without changes.      OBJECTIVE:     /70 (BP Location: Left arm, Patient Position: Chair, Cuff Size: Adult Large)  Pulse 72  Resp 16  Ht 5' 11.75\" (1.822 m)  Wt 221 lb (100.2 kg)  SpO2 98%  BMI 30.18 kg/m2  Body mass index is 30.18 kg/(m^2).  GENERAL: healthy, alert and no distress  NECK: no adenopathy, no asymmetry, masses, or scars, thyroid normal to palpation and no carotid bruits  RESP: lungs clear to auscultation - no rales, rhonchi or wheezes  CV: regular rate and rhythm, normal S1 S2, no S3 or S4, no murmur, click or rub, no peripheral edema and peripheral pulses strong  MS: no gross musculoskeletal defects noted, no edema  SKIN:  Left axilla - fish hook shaped erythematous lesion, smooth surface but note itching.    PSYCH: mentation appears normal, affect normal/bright    Results for orders placed or performed in visit on 11/26/18   Lipid Profile   Result Value Ref Range    Cholesterol 183 <200 mg/dL    Triglycerides 62 <150 mg/dL    HDL Cholesterol 62 >39 mg/dL    LDL Cholesterol Calculated 109 (H) <100 mg/dL    Non HDL Cholesterol 121 <130 mg/dL   TSH " Chief Complaint / Reason for Physician Visit \"No problems. Discussed with RN events overnight. Review of Systems: 
Symptom Y/N Comments  Symptom Y/N Comments Fever/Chills n   Chest Pain n   
Poor Appetite    Edema n   
Cough n   Abdominal Pain n   
Sputum    Joint Pain SOB/MARTINEZ n   Pruritis/Rash Nausea/vomit n   Tolerating PT/OT Diarrhea    Tolerating Diet y Constipation    Other Could NOT obtain due to:   
 
Objective: VITALS:  
Last 24hrs VS reviewed since prior progress note. Most recent are: 
Patient Vitals for the past 24 hrs: 
 Temp Pulse Resp BP SpO2  
05/08/19 1245 98.2 °F (36.8 °C) 63 18 132/77 100 % 05/08/19 0747 97.9 °F (36.6 °C) 68 18 140/80 100 % 05/08/19 0415 97.9 °F (36.6 °C) 72 18 150/85 99 % 05/07/19 2357 98 °F (36.7 °C) 75 18 163/80 100 % 05/07/19 1947 97.7 °F (36.5 °C) 69 18 140/82 97 % 05/07/19 1610 98.3 °F (36.8 °C) 75 20 137/72 100 % No intake or output data in the 24 hours ending 05/08/19 1513 PHYSICAL EXAM: 
General: WD, WN. Alert, cooperative, no acute distress   
EENT:  EOMI. Anicteric sclerae. MMM Resp:  CTA bilaterally, no wheezing or rales. No accessory muscle use CV:  Regular  rhythm,  No edema GI:  Soft, Non distended, Non tender.  +Bowel sounds Neurologic:  Alert and oriented X 3 when I saw normal speech, LLE weakness Psych:   Some insight. Not anxious nor agitated Skin:  No rashes. No jaundice Reviewed most current lab test results and cultures  YES Reviewed most current radiology test results   YES Review and summation of old records today    NO Reviewed patient's current orders and MAR    YES 
PMH/SH reviewed - no change compared to H&P 
________________________________________________________________________ Care Plan discussed with: 
  Comments Patient x Family  x Family at bedside RN x Care Manager x Consultant  x Dr Benjy Recio                   Multidiciplinary team rounds were held today with case manager, nursing, pharmacist and clinical coordinator. Patient's plan of care was discussed; medications were reviewed and discharge planning was addressed. ________________________________________________________________________ Total NON critical care TIME:  35 Minutes Total CRITICAL CARE TIME Spent:   Minutes non procedure based Comments >50% of visit spent in counseling and coordination of care x   
________________________________________________________________________ Chaim Martin MD  
 
Procedures: see electronic medical records for all procedures/Xrays and details which were not copied into this note but were reviewed prior to creation of Plan. LABS: 
I reviewed today's most current labs and imaging studies. Pertinent labs include: 
Recent Labs 05/08/19 
1021 05/05/19 
1540 WBC 4.9 5.8 HGB 13.6 13.9 HCT 41.0 41.5  199 Recent Labs 05/08/19 
1021 05/06/19 
1047 05/05/19 
1540   --  140  
K 3.7  --  4.2 *  --  109* CO2 24  --  25 *  --  177* BUN 16  --  21* CREA 1.78*  --  1.79* CA 8.5  --  9.2 MG  --  2.2  --   
ALB 3.5  --   --   
TBILI 0.6  --   --   
SGOT 17  --   --   
ALT 6*  --   --   
INR  --   --  1.1 Signed: Chaim Martin MD 
 
 
 
 with free T4 reflex   Result Value Ref Range    TSH 1.72 0.40 - 4.00 mU/L   Comprehensive metabolic panel   Result Value Ref Range    Sodium 137 133 - 144 mmol/L    Potassium 3.9 3.4 - 5.3 mmol/L    Chloride 104 94 - 109 mmol/L    Carbon Dioxide 26 20 - 32 mmol/L    Anion Gap 7 3 - 14 mmol/L    Glucose 66 (L) 70 - 99 mg/dL    Urea Nitrogen 14 7 - 30 mg/dL    Creatinine 0.85 0.66 - 1.25 mg/dL    GFR Estimate 89 >60 mL/min/1.7m2    GFR Estimate If Black >90 >60 mL/min/1.7m2    Calcium 8.5 8.5 - 10.1 mg/dL    Bilirubin Total 0.3 0.2 - 1.3 mg/dL    Albumin 3.8 3.4 - 5.0 g/dL    Protein Total 7.5 6.8 - 8.8 g/dL    Alkaline Phosphatase 55 40 - 150 U/L    ALT 17 0 - 70 U/L    AST 11 0 - 45 U/L          EKG Interpretation:      Interpreted by Izzy Wagner  Time reviewed:11/26/18   Symptoms at time of EKG: None   Rhythm: Normal sinus   Rate: Normal  Axis: Normal  Ectopy: None  Conduction: Normal  ST Segments/ T Waves: No ST-T wave changes and No acute ischemic changes  Q Waves: None  Comparison to prior: No old EKG available    Clinical Impression: normal EKG      ASSESSMENT/PLAN:       1. Hyperlipidemia LDL goal <100  chronic  - Lipid Profile  - TSH with free T4 reflex  - Comprehensive metabolic panel    2. Gastroesophageal reflux disease with esophagitis  Continue current plan    3. Need for hepatitis C screening test  routine  - Hepatitis C Screen Reflex to HCV RNA Quant and Genotype    4. Encounter for screening colonoscopy  routine  - GENERAL SURG ADULT REFERRAL  - EKG 12-lead complete w/read - (Clinic Performed)    5. On statin therapy  - Comprehensive metabolic panel    6. Encounter for abdominal aortic aneurysm (AAA) screening  routine  - [place] Aorta Medicare AAA Screening; Future    7. Dermatitis  - triamcinolone (KENALOG) 0.1 % cream; Apply sparingly to affected area three times daily as needed  Dispense: 80 g; Refill: 0    shingrix encouraged.  Will obtain at local pharmacy due to medicare.      FUTURE  APPOINTMENTS:       - Follow-up visit in 6 months or as needed for acute concerns.     Izzy Wagner NP  [facility]

## 2019-05-22 NOTE — PROGRESS NOTES
Subjective     Jim Brown is a 69 year old male who presents to clinic today for the following health issues:    HPI   Hyperlipidemia Follow-Up      Are you having any of the following symptoms? (Select all that apply)  No complaints of shortness of breath, chest pain or pressure.  No increased sweating or nausea with activity.  No left-sided neck or arm pain.  No complaints of pain in calves when walking 1-2 blocks.    Are you regularly taking any medication or supplement to lower your cholesterol?   Yes- Simvastatin    Are you having muscle aches or other side effects that you think could be caused by your cholesterol lowering medication?  No   The 10-year ASCVD risk score ([place] SHANE Jr., et al., 2013) is: 10.4%    Values used to calculate the score:      Age: 69 years      Sex: Male      Is Non- : No      Diabetic: No      Tobacco smoker: No      Systolic Blood Pressure: 104 mmHg      Is BP treated: No      HDL Cholesterol: 62 mg/dL        Total Cholesterol: 183 mg/dL            Amount of exercise or physical activity: Active    Problems taking medications regularly: No    Medication side effects: none    Diet: low fat/cholesterol      GERD/Heartburn  Controlled with current plan        Headache  Duration of complaint: Last 6 weeks, 2-3 per week.  Headache seems to be triggered by weather changes - if raining will get HA, if kelsi will get HA.        Patient Active Problem List   Diagnosis     Hyperlipidemia LDL goal <100     Generalized anxiety disorder     Gastroesophageal reflux disease with esophagitis     Benign prostatic hyperplasia with urinary obstruction     Acute pancreatitis     Agoraphobia with panic disorder     Compression deformity of vertebra     Pericardial effusion     Personality disorder (H)     Unilateral inguinal hernia     Past Surgical History:   Procedure Laterality Date     ABDOMEN SURGERY      hernia surgerys 3-4     CHOLECYSTECTOMY  2016    galbladder       "COLONOSCOPY  12/06/2018    [facility], negative for polyps 10 year      ENT SURGERY      tonsilectomy when little        Social History     Tobacco Use     Smoking status: Former Smoker     Packs/day: 1.00     Years: 20.00     Pack years: 20.00     Start date: 11/27/1971     Smokeless tobacco: Former User     Types: Chew   Substance Use Topics     Alcohol use: No     Family History   Problem Relation Age of Onset     Coronary Artery Disease Father      Other Cancer Brother          Current Outpatient Medications   Medication Sig Dispense Refill     aspirin 81 MG EC tablet Take 81 mg by mouth       Multiple Vitamin (MULTI-VITAMINS) TABS        ranitidine (ZANTAC) 300 MG tablet TAKE 1 TABLET EVERY EVENING. ACID REDUCER       simvastatin (ZOCOR) 40 MG tablet TAKE 1 TABLET BY MOUTH AT BEDTIME.       triamcinolone (KENALOG) 0.1 % cream Apply sparingly to affected area three times daily as needed 80 g 0     No Known Allergies  Recent Labs   Lab Test 11/26/18  1059   *   HDL 62   TRIG 62   ALT 17   CR 0.85   GFRESTIMATED 89   GFRESTBLACK >90   POTASSIUM 3.9   TSH 1.72      BP Readings from Last 3 Encounters:   05/24/19 104/64   02/25/19 110/80   02/20/19 116/64    Wt Readings from Last 3 Encounters:   05/24/19 104.8 kg (231 lb)   02/25/19 103.9 kg (229 lb)   02/20/19 103.9 kg (229 lb)              Reviewed and updated as needed this visit by Provider         Review of Systems   ROS COMP: Constitutional, HEENT, cardiovascular, pulmonary, gi and gu systems are negative, except as otherwise noted.      Objective    /64 (BP Location: Right arm, Patient Position: Sitting, Cuff Size: Adult Large)   Pulse 72   Temp 97.2  F (36.2  C) (Tympanic)   Resp 14   Ht 1.822 m (5' 11.75\")   Wt 104.8 kg (231 lb)   SpO2 96%   BMI 31.55 kg/m    Body mass index is 31.55 kg/m .  Physical Exam   GENERAL: healthy, alert and no distress  NECK: no adenopathy, no asymmetry, masses, or scars, thyroid normal to palpation and no " "carotid bruits  RESP: lungs clear to auscultation - no rales, rhonchi or wheezes  CV: regular rate and rhythm, normal S1 S2, no S3 or S4, no murmur, click or rub, no peripheral edema and peripheral pulses strong  MS: no gross musculoskeletal defects noted, no edema  NEURO: Normal strength and tone, sensory exam grossly normal, mentation intact and cranial nerves 2-12 intact  PSYCH: mentation appears normal, affect normal/bright          Assessment & Plan     1. Hyperlipidemia LDL goal <100  chronic  - Lipid Profile  - Comprehensive metabolic panel  - TSH with free T4 reflex    2. Gastroesophageal reflux disease with esophagitis  Continue current plan    3. On statin therapy  - Comprehensive metabolic panel    4. Tension headache  Aspirin, tylenol or ibuprofen per package directions  If no improvement or Headache continues, will consider CT of head.        BMI:   Estimated body mass index is 31.55 kg/m  as calculated from the following:    Height as of this encounter: 1.822 m (5' 11.75\").    Weight as of this encounter: 104.8 kg (231 lb).   Weight management plan: Discussed healthy diet and exercise guidelines        FUTURE APPOINTMENTS:       - Follow-up visit in 6 months or as needed for acute concerns.     Izzy Wagner NP  [facility]    "

## 2019-05-24 ENCOUNTER — OFFICE VISIT (OUTPATIENT)
Dept: FAMILY MEDICINE | Facility: OTHER | Age: 69
End: 2019-05-24
Attending: NURSE PRACTITIONER
Payer: COMMERCIAL

## 2019-05-24 VITALS
BODY MASS INDEX: 31.29 KG/M2 | HEIGHT: 72 IN | WEIGHT: 231 LBS | HEART RATE: 72 BPM | OXYGEN SATURATION: 96 % | TEMPERATURE: 97.2 F | DIASTOLIC BLOOD PRESSURE: 64 MMHG | RESPIRATION RATE: 14 BRPM | SYSTOLIC BLOOD PRESSURE: 104 MMHG

## 2019-05-24 DIAGNOSIS — G44.209 TENSION HEADACHE: ICD-10-CM

## 2019-05-24 DIAGNOSIS — Z79.899 ON STATIN THERAPY: ICD-10-CM

## 2019-05-24 DIAGNOSIS — E78.5 HYPERLIPIDEMIA LDL GOAL <100: Primary | ICD-10-CM

## 2019-05-24 DIAGNOSIS — K21.00 GASTROESOPHAGEAL REFLUX DISEASE WITH ESOPHAGITIS: ICD-10-CM

## 2019-05-24 LAB
ALBUMIN SERPL-MCNC: 4 G/DL (ref 3.4–5)
ALP SERPL-CCNC: 48 U/L (ref 40–150)
ALT SERPL W P-5'-P-CCNC: 15 U/L (ref 0–70)
ANION GAP SERPL CALCULATED.3IONS-SCNC: 8 MMOL/L (ref 3–14)
AST SERPL W P-5'-P-CCNC: 11 U/L (ref 0–45)
BILIRUB SERPL-MCNC: 0.3 MG/DL (ref 0.2–1.3)
BUN SERPL-MCNC: 22 MG/DL (ref 7–30)
CALCIUM SERPL-MCNC: 8.8 MG/DL (ref 8.5–10.1)
CHLORIDE SERPL-SCNC: 105 MMOL/L (ref 94–109)
CHOLEST SERPL-MCNC: 162 MG/DL
CO2 SERPL-SCNC: 24 MMOL/L (ref 20–32)
CREAT SERPL-MCNC: 0.95 MG/DL (ref 0.66–1.25)
GFR SERPL CREATININE-BSD FRML MDRD: 81 ML/MIN/{1.73_M2}
GLUCOSE SERPL-MCNC: 97 MG/DL (ref 70–99)
HDLC SERPL-MCNC: 55 MG/DL
LDLC SERPL CALC-MCNC: 99 MG/DL
NONHDLC SERPL-MCNC: 107 MG/DL
POTASSIUM SERPL-SCNC: 4.2 MMOL/L (ref 3.4–5.3)
PROT SERPL-MCNC: 7.7 G/DL (ref 6.8–8.8)
SODIUM SERPL-SCNC: 137 MMOL/L (ref 133–144)
TRIGL SERPL-MCNC: 41 MG/DL
TSH SERPL DL<=0.005 MIU/L-ACNC: 1.07 MU/L (ref 0.4–4)

## 2019-05-24 PROCEDURE — 36415 COLL VENOUS BLD VENIPUNCTURE: CPT | Performed by: NURSE PRACTITIONER

## 2019-05-24 PROCEDURE — 99214 OFFICE O/P EST MOD 30 MIN: CPT | Performed by: NURSE PRACTITIONER

## 2019-05-24 PROCEDURE — 80061 LIPID PANEL: CPT | Performed by: NURSE PRACTITIONER

## 2019-05-24 PROCEDURE — 84443 ASSAY THYROID STIM HORMONE: CPT | Performed by: NURSE PRACTITIONER

## 2019-05-24 PROCEDURE — 80053 COMPREHEN METABOLIC PANEL: CPT | Performed by: NURSE PRACTITIONER

## 2019-05-24 ASSESSMENT — PAIN SCALES - GENERAL: PAINLEVEL: NO PAIN (0)

## 2019-05-24 ASSESSMENT — MIFFLIN-ST. JEOR: SCORE: 1846.84

## 2019-05-24 NOTE — PATIENT INSTRUCTIONS
"    Assessment & Plan     1. Hyperlipidemia LDL goal <100  chronic  - Lipid Profile  - Comprehensive metabolic panel  - TSH with free T4 reflex    2. Gastroesophageal reflux disease with esophagitis  Continue current plan    3. On statin therapy  - Comprehensive metabolic panel      4. Tension headache  Aspirin, tylenol or ibuprofen per package directions  If no improvement or Headache continues, will consider CT of head.      BMI:   Estimated body mass index is 31.55 kg/m  as calculated from the following:    Height as of this encounter: 1.822 m (5' 11.75\").    Weight as of this encounter: 104.8 kg (231 lb).   Weight management plan: Discussed healthy diet and exercise guidelines        FUTURE APPOINTMENTS:       - Follow-up visit in 6 months or as needed for acute concerns.     No follow-ups on file.    Izzy Wagner NP  [facility] - [facility]    "

## 2019-05-24 NOTE — NURSING NOTE
"Chief Complaint   Patient presents with     Hyperlipidemia     Gastrophageal Reflux       Initial /64 (BP Location: Right arm, Patient Position: Sitting, Cuff Size: Adult Large)   Pulse 72   Temp 97.2  F (36.2  C) (Tympanic)   Resp 14   Ht 1.822 m (5' 11.75\")   Wt 104.8 kg (231 lb)   SpO2 96%   BMI 31.55 kg/m   Estimated body mass index is 31.55 kg/m  as calculated from the following:    Height as of this encounter: 1.822 m (5' 11.75\").    Weight as of this encounter: 104.8 kg (231 lb).  Medication Reconciliation: complete    Shirlene Tony LPN    "

## 2019-08-16 NOTE — PROGRESS NOTES
Subjective     Jim Brown is a 69 year old male who presents to clinic today for the following health issues:      HPI   Hyperlipidemia Follow-Up      Are you having any of the following symptoms? (Select all that apply)  No complaints of shortness of breath, chest pain or pressure.  No increased sweating or nausea with activity.  No left-sided neck or arm pain.  No complaints of pain in calves when walking 1-2 blocks.    Are you regularly taking any medication or supplement to lower your cholesterol?   Yes- simvastatin 40 mg daily    Are you having muscle aches or other side effects that you think could be caused by your cholesterol lowering medication?  No   The 10-year ASCVD risk score (Juliano SHANE Jr., et al., 2013) is: 13%    Values used to calculate the score:      Age: 69 years      Sex: Male      Is Non- : No      Diabetic: No      Tobacco smoker: No      Systolic Blood Pressure: 120 mmHg      Is BP treated: No      HDL Cholesterol: 55 mg/dL      Total Cholesterol: 162 mg/dL              How many servings of fruits and vegetables do you eat daily?  0-1    On average, how many sweetened beverages do you drink each day (soda, juice, sweet tea, etc)?   0    How many days per week do you miss taking your medication? 0        GERD/Heartburn  Onset: ongoing    Description:     Burning in chest: no    Intensity: mild    Progression of Symptoms: improving    Accompanying Signs & Symptoms:  Does it feel like food gets stuck: no  Nausea: no   Vomiting (bloody?): no  Abdominal Pain: no  Black-Tarry stools: no:  Bloody stools: no    History:   Previous ulcers: no    Precipitating factors:   Caffeine use: no  Alcohol use: no  NSAID/Aspirin use: YES  Tobacco use: no  Worse with no particular food or drink.    Alleviating factors:  Zantac - much improved    Therapies Tried and outcome:zantac - well controlled.      Patient Active Problem List   Diagnosis     Hyperlipidemia LDL goal <100      Generalized anxiety disorder     Gastroesophageal reflux disease with esophagitis     Benign prostatic hyperplasia with urinary obstruction     Acute pancreatitis     Agoraphobia with panic disorder     Compression deformity of vertebra     Pericardial effusion     Personality disorder (H)     Unilateral inguinal hernia     Past Surgical History:   Procedure Laterality Date     ABDOMEN SURGERY      hernia surgerys 3-4     CHOLECYSTECTOMY  2016    galbladder      COLONOSCOPY  12/06/2018    [facility], negative for polyps 10 year      ENT SURGERY      tonsilectomy when little        Social History     Tobacco Use     Smoking status: Former Smoker     Packs/day: 1.00     Years: 20.00     Pack years: 20.00     Start date: 11/27/1971     Smokeless tobacco: Former User     Types: Chew   Substance Use Topics     Alcohol use: No     Family History   Problem Relation Age of Onset     Coronary Artery Disease Father      Other Cancer Brother          Current Outpatient Medications   Medication Sig Dispense Refill     aspirin 81 MG EC tablet Take 81 mg by mouth       Multiple Vitamin (MULTI-VITAMINS) TABS        ranitidine (ZANTAC) 300 MG tablet TAKE 1 TABLET EVERY EVENING. ACID REDUCER       simvastatin (ZOCOR) 40 MG tablet TAKE 1 TABLET BY MOUTH AT BEDTIME.       triamcinolone (KENALOG) 0.1 % cream Apply sparingly to affected area three times daily as needed 80 g 0     No Known Allergies  Recent Labs   Lab Test 05/24/19  0910 11/26/18  1059   LDL 99 109*   HDL 55 62   TRIG 41 62   ALT 15 17   CR 0.95 0.85   GFRESTIMATED 81 89   GFRESTBLACK >90 >90   POTASSIUM 4.2 3.9   TSH 1.07 1.72      BP Readings from Last 3 Encounters:   08/21/19 120/84   05/24/19 104/64   02/25/19 110/80    Wt Readings from Last 3 Encounters:   08/21/19 103 kg (227 lb)   05/24/19 104.8 kg (231 lb)   02/25/19 103.9 kg (229 lb)               Reviewed and updated as needed this visit by Provider         Review of Systems   ROS COMP: Constitutional, HEENT,  "cardiovascular, pulmonary, gi and gu systems are negative, except as otherwise noted.    He does note ED, wondering if could try a medication.  He is due for PSA screen today as well.        Objective    /84 (BP Location: Left arm, Patient Position: Sitting, Cuff Size: Adult Regular)   Pulse 71   Temp 96.2  F (35.7  C) (Tympanic)   Resp 15   Ht 1.822 m (5' 11.75\")   Wt 103 kg (227 lb)   SpO2 96%   BMI 31.00 kg/m    Body mass index is 31 kg/m .  Physical Exam   GENERAL: healthy, alert and no distress  NECK: no adenopathy, no asymmetry, masses, or scars, thyroid normal to palpation and no carotid bruits  RESP: lungs clear to auscultation - no rales, rhonchi or wheezes  CV: regular rate and rhythm, normal S1 S2, no S3 or S4, no murmur, click or rub, no peripheral edema and peripheral pulses strong  MS: no gross musculoskeletal defects noted, no edema  PSYCH: mentation appears normal, affect normal/bright            Assessment & Plan     1. Hyperlipidemia LDL goal <100  - Lipid Profile  - Comprehensive metabolic panel  - TSH with free T4 reflex    2. On statin therapy  - Comprehensive metabolic panel    3. Gastroesophageal reflux disease with esophagitis    4. Erectile dysfunction, unspecified erectile dysfunction type  - sildenafil (VIAGRA) 100 MG tablet; Take 1 tablet (100 mg) by mouth daily as needed (take 30 min to 4 hours as needed prior to intercourse.)  Dispense: 12 tablet; Refill: 5    5. Candidiasis of skin  - nystatin (MYCOSTATIN) 851312 UNIT/GM external cream; Apply topically 2 times daily  Dispense: 120 g; Refill: 1    6. Screening for prostate cancer  - PSA, screen       BMI:   Estimated body mass index is 31 kg/m  as calculated from the following:    Height as of this encounter: 1.822 m (5' 11.75\").    Weight as of this encounter: 103 kg (227 lb).   Weight management plan: Discussed healthy diet and exercise guidelines      Return in about 6 months (around 2/21/2020) for lipids.    Izzy SORIA" JUMANA Wagner  [facility]

## 2019-08-21 ENCOUNTER — OFFICE VISIT (OUTPATIENT)
Dept: FAMILY MEDICINE | Facility: OTHER | Age: 69
End: 2019-08-21
Attending: NURSE PRACTITIONER
Payer: COMMERCIAL

## 2019-08-21 VITALS
TEMPERATURE: 96.2 F | HEART RATE: 71 BPM | OXYGEN SATURATION: 96 % | HEIGHT: 72 IN | BODY MASS INDEX: 30.75 KG/M2 | DIASTOLIC BLOOD PRESSURE: 84 MMHG | WEIGHT: 227 LBS | SYSTOLIC BLOOD PRESSURE: 120 MMHG | RESPIRATION RATE: 15 BRPM

## 2019-08-21 DIAGNOSIS — B37.2 CANDIDIASIS OF SKIN: ICD-10-CM

## 2019-08-21 DIAGNOSIS — Z12.5 SCREENING FOR PROSTATE CANCER: ICD-10-CM

## 2019-08-21 DIAGNOSIS — K21.00 GASTROESOPHAGEAL REFLUX DISEASE WITH ESOPHAGITIS: ICD-10-CM

## 2019-08-21 DIAGNOSIS — Z79.899 ON STATIN THERAPY: ICD-10-CM

## 2019-08-21 DIAGNOSIS — E78.5 HYPERLIPIDEMIA LDL GOAL <100: Primary | ICD-10-CM

## 2019-08-21 DIAGNOSIS — N52.9 ERECTILE DYSFUNCTION, UNSPECIFIED ERECTILE DYSFUNCTION TYPE: ICD-10-CM

## 2019-08-21 LAB
ALBUMIN SERPL-MCNC: 4.1 G/DL (ref 3.4–5)
ALP SERPL-CCNC: 50 U/L (ref 40–150)
ALT SERPL W P-5'-P-CCNC: 16 U/L (ref 0–70)
ANION GAP SERPL CALCULATED.3IONS-SCNC: 6 MMOL/L (ref 3–14)
AST SERPL W P-5'-P-CCNC: 11 U/L (ref 0–45)
BILIRUB SERPL-MCNC: 0.5 MG/DL (ref 0.2–1.3)
BUN SERPL-MCNC: 14 MG/DL (ref 7–30)
CALCIUM SERPL-MCNC: 8.9 MG/DL (ref 8.5–10.1)
CHLORIDE SERPL-SCNC: 105 MMOL/L (ref 94–109)
CHOLEST SERPL-MCNC: 197 MG/DL
CO2 SERPL-SCNC: 26 MMOL/L (ref 20–32)
CREAT SERPL-MCNC: 0.9 MG/DL (ref 0.66–1.25)
GFR SERPL CREATININE-BSD FRML MDRD: 86 ML/MIN/{1.73_M2}
GLUCOSE SERPL-MCNC: 88 MG/DL (ref 70–99)
HDLC SERPL-MCNC: 59 MG/DL
LDLC SERPL CALC-MCNC: 127 MG/DL
NONHDLC SERPL-MCNC: 138 MG/DL
POTASSIUM SERPL-SCNC: 4.4 MMOL/L (ref 3.4–5.3)
PROT SERPL-MCNC: 7.9 G/DL (ref 6.8–8.8)
PSA SERPL-ACNC: 1.13 UG/L (ref 0–4)
SODIUM SERPL-SCNC: 137 MMOL/L (ref 133–144)
TRIGL SERPL-MCNC: 53 MG/DL
TSH SERPL DL<=0.005 MIU/L-ACNC: 1.52 MU/L (ref 0.4–4)

## 2019-08-21 PROCEDURE — 84153 ASSAY OF PSA TOTAL: CPT | Performed by: NURSE PRACTITIONER

## 2019-08-21 PROCEDURE — 80053 COMPREHEN METABOLIC PANEL: CPT | Performed by: NURSE PRACTITIONER

## 2019-08-21 PROCEDURE — 80061 LIPID PANEL: CPT | Performed by: NURSE PRACTITIONER

## 2019-08-21 PROCEDURE — 36415 COLL VENOUS BLD VENIPUNCTURE: CPT | Performed by: NURSE PRACTITIONER

## 2019-08-21 PROCEDURE — 99214 OFFICE O/P EST MOD 30 MIN: CPT | Performed by: NURSE PRACTITIONER

## 2019-08-21 PROCEDURE — 84443 ASSAY THYROID STIM HORMONE: CPT | Performed by: NURSE PRACTITIONER

## 2019-08-21 RX ORDER — SILDENAFIL 100 MG/1
100 TABLET, FILM COATED ORAL DAILY PRN
Qty: 12 TABLET | Refills: 5 | Status: SHIPPED | OUTPATIENT
Start: 2019-08-21 | End: 2021-04-21

## 2019-08-21 RX ORDER — NYSTATIN 100000 U/G
CREAM TOPICAL 2 TIMES DAILY
Qty: 120 G | Refills: 1 | Status: SHIPPED | OUTPATIENT
Start: 2019-08-21 | End: 2020-08-25

## 2019-08-21 ASSESSMENT — PAIN SCALES - GENERAL: PAINLEVEL: NO PAIN (0)

## 2019-08-21 ASSESSMENT — MIFFLIN-ST. JEOR: SCORE: 1828.7

## 2019-08-21 NOTE — PATIENT INSTRUCTIONS
"            Assessment & Plan     1. Hyperlipidemia LDL goal <100  - Lipid Profile  - Comprehensive metabolic panel  - TSH with free T4 reflex    2. On statin therapy  - Comprehensive metabolic panel    3. Gastroesophageal reflux disease with esophagitis    4. Erectile dysfunction, unspecified erectile dysfunction type  - sildenafil (VIAGRA) 100 MG tablet; Take 1 tablet (100 mg) by mouth daily as needed (take 30 min to 4 hours as needed prior to intercourse.)  Dispense: 12 tablet; Refill: 5    5. Candidiasis of skin  - nystatin (MYCOSTATIN) 547449 UNIT/GM external cream; Apply topically 2 times daily  Dispense: 120 g; Refill: 1       BMI:   Estimated body mass index is 31 kg/m  as calculated from the following:    Height as of this encounter: 1.822 m (5' 11.75\").    Weight as of this encounter: 103 kg (227 lb).   Weight management plan: Discussed healthy diet and exercise guidelines      Return in about 6 months (around 2/21/2020) for lipids.    Izzy Wagner NP  [facility]      "

## 2019-08-21 NOTE — NURSING NOTE
"Chief Complaint   Patient presents with     Lipids     Gastrophageal Reflux       Initial /84 (BP Location: Left arm, Patient Position: Sitting, Cuff Size: Adult Regular)   Pulse 71   Temp 96.2  F (35.7  C) (Tympanic)   Resp 15   Ht 1.822 m (5' 11.75\")   Wt 103 kg (227 lb)   SpO2 96%   BMI 31.00 kg/m   Estimated body mass index is 31 kg/m  as calculated from the following:    Height as of this encounter: 1.822 m (5' 11.75\").    Weight as of this encounter: 103 kg (227 lb).  Medication Reconciliation: complete   Mylene Brown LPN    "

## 2019-10-11 DIAGNOSIS — E78.5 HYPERLIPIDEMIA LDL GOAL <100: Primary | ICD-10-CM

## 2019-10-11 DIAGNOSIS — K21.9 GASTROESOPHAGEAL REFLUX DISEASE, ESOPHAGITIS PRESENCE NOT SPECIFIED: ICD-10-CM

## 2019-10-11 RX ORDER — SIMVASTATIN 40 MG
40 TABLET ORAL AT BEDTIME
Qty: 90 TABLET | Refills: 1 | Status: SHIPPED | OUTPATIENT
Start: 2019-10-11 | End: 2020-02-24

## 2019-10-11 NOTE — TELEPHONE ENCOUNTER
ranitidine (ZANTAC) 300 MG tablet      Last Written Prescription Date:  NA  Last Fill Quantity: NA,   # refills: NA  Last Office Visit: 8-21-19  Future Office visit:       Routing refill request to provider for review/approval because:  Medication is reported/historical        simvastatin (ZOCOR) 40 MG tablet      Last Written Prescription Date:  NA  Last Fill Quantity: NA,   # refills: NA  Last Office Visit: 8-21-19  Future Office visit:       Routing refill request to provider for review/approval because:  Medication is reported/historical

## 2020-02-18 NOTE — PROGRESS NOTES
Subjective     Jim Brown is a 69 year old male who presents to clinic today for the following health issues:    HPI   Hyperlipidemia Follow-Up      Are you regularly taking any medication or supplement to lower your cholesterol?   Yes- simvastatin    Are you having muscle aches or other side effects that you think could be caused by your cholesterol lowering medication?  No   The 10-year ASCVD risk score (Juliano SHANE Jr., et al., 2013) is: 14.4%    Values used to calculate the score:      Age: 69 years      Sex: Male      Is Non- : No      Diabetic: No      Tobacco smoker: No      Systolic Blood Pressure: 122 mmHg      Is BP treated: No      HDL Cholesterol: 59 mg/dL      Total Cholesterol: 197 mg/dL        Depression and Anxiety Follow-Up    How are you doing with your depression since your last visit? No change    How are you doing with your anxiety since your last visit?  No change    Are you having other symptoms that might be associated with depression or anxiety? No    Have you had a significant life event? OTHER: suicide of daughters boyfriend      Do you have any concerns with your use of alcohol or other drugs? No    Social History     Tobacco Use     Smoking status: Former Smoker     Packs/day: 1.00     Years: 20.00     Pack years: 20.00     Start date: 11/27/1971     Smokeless tobacco: Former User     Types: Chew   Substance Use Topics     Alcohol use: No     Drug use: No     PHQ 11/26/2018 2/25/2019 2/24/2020   PHQ-9 Total Score 8 0 14   Q9: Thoughts of better off dead/self-harm past 2 weeks Not at all Not at all Not at all     RAMO-7 SCORE 11/26/2018 2/25/2019 2/24/2020   Total Score 0 0 0     Last PHQ-9 2/24/2020   1.  Little interest or pleasure in doing things 1   2.  Feeling down, depressed, or hopeless 2   3.  Trouble falling or staying asleep, or sleeping too much 3   4.  Feeling tired or having little energy 1   5.  Poor appetite or overeating 3   6.  Feeling bad about  yourself 3   7.  Trouble concentrating 1   8.  Moving slowly or restless 0   Q9: Thoughts of better off dead/self-harm past 2 weeks 0   PHQ-9 Total Score 14   Difficulty at work, home, or with people Somewhat difficult     RAMO-7  2/24/2020   1. Feeling nervous, anxious, or on edge 0   2. Not being able to stop or control worrying 0   3. Worrying too much about different things 0   4. Trouble relaxing 0   5. Being so restless that it is hard to sit still 0   6. Becoming easily annoyed or irritable 0   7. Feeling afraid, as if something awful might happen 0   RAMO-7 Total Score 0   If you checked any problems, how difficult have they made it for you to do your work, take care of things at home, or get along with other people? Not difficult at all         Suicide Assessment Five-step Evaluation and Treatment (SAFE-T)      How many servings of fruits and vegetables do you eat daily?  2-3    On average, how many sweetened beverages do you drink each day (Examples: soda, juice, sweet tea, etc.  Do NOT count diet or artificially sweetened beverages)?   0    How many days per week do you exercise enough to make your heart beat faster? 7    How many minutes a day do you exercise enough to make your heart beat faster? 60 or more    How many days per week do you miss taking your medication? 0    GERD/Heartburn  Onset: awhile     Description:     Burning in chest: no    Intensity: mild    Progression of Symptoms: same    Accompanying Signs & Symptoms:  Does it feel like food gets stuck: no   Nausea: no  Vomiting (bloody?): no  Abdominal Pain: YES  Black-Tarry stools: no :  Bloody stools: no    History:   Previous ulcers: no    Precipitating factors:   Caffeine use: YES  Alcohol use: no  NSAID/Aspirin use: YES  Tobacco use: no   Worse with no particular food or drink.    Alleviating factors:  Waiting      Therapies Tried and outcome:none    PSA   Date Value Ref Range Status   08/21/2019 1.13 0 - 4 ug/L Final     Comment:      "Assay Method:  Chemiluminescence using Siemens Vista analyzer     He has an abscessed tooth, his dental appt is not until March 14.  He has had several infections, due to poor dentition.      His other concern is new headache, pain will start over frontal area, he then feels \"electric shocks\" travel behind eyes and last only a few seconds.  Denies aura.  Pain does not last very long but is intense.  He does not take anything for them.  Denies nausea with the headache.      Patient Active Problem List   Diagnosis     Hyperlipidemia LDL goal <100     Generalized anxiety disorder     Gastroesophageal reflux disease with esophagitis     Benign prostatic hyperplasia with urinary obstruction     Acute pancreatitis     Agoraphobia with panic disorder     Compression deformity of vertebra     Pericardial effusion     Personality disorder (H)     Unilateral inguinal hernia     Past Surgical History:   Procedure Laterality Date     ABDOMEN SURGERY      hernia surgerys 3-4     CHOLECYSTECTOMY  2016    galbladder      COLONOSCOPY  12/06/2018    [facility], negative for polyps 10 year      ENT SURGERY      tonsilectomy when little        Social History     Tobacco Use     Smoking status: Former Smoker     Packs/day: 1.00     Years: 20.00     Pack years: 20.00     Start date: 11/27/1971     Smokeless tobacco: Former User     Types: Chew   Substance Use Topics     Alcohol use: No     Family History   Problem Relation Age of Onset     Coronary Artery Disease Father      Other Cancer Brother          Current Outpatient Medications   Medication Sig Dispense Refill     aspirin 81 MG EC tablet Take 81 mg by mouth       Multiple Vitamin (MULTI-VITAMINS) TABS        nystatin (MYCOSTATIN) 674841 UNIT/GM external cream Apply topically 2 times daily 120 g 1     penicillin V (VEETID) 500 MG tablet Take 1 tablet (500 mg) by mouth 3 times daily for 10 days 30 tablet 0     ranitidine (ZANTAC) 300 MG tablet TAKE 1 TABLET EVERY EVENING. ACID " "REDUCER 90 tablet 3     sildenafil (VIAGRA) 100 MG tablet Take 1 tablet (100 mg) by mouth daily as needed (take 30 min to 4 hours as needed prior to intercourse.) 12 tablet 5     simvastatin (ZOCOR) 40 MG tablet Take 1 tablet (40 mg) by mouth At Bedtime 90 tablet 1     triamcinolone (KENALOG) 0.1 % cream Apply sparingly to affected area three times daily as needed 80 g 0     No Known Allergies  Recent Labs   Lab Test 08/21/19  0952 05/24/19  0910 11/26/18  1059   * 99 109*   HDL 59 55 62   TRIG 53 41 62   ALT 16 15 17   CR 0.90 0.95 0.85   GFRESTIMATED 86 81 89   GFRESTBLACK >90 >90 >90   POTASSIUM 4.4 4.2 3.9   TSH 1.52 1.07 1.72      BP Readings from Last 3 Encounters:   02/24/20 122/76   08/21/19 120/84   05/24/19 104/64    Wt Readings from Last 3 Encounters:   02/24/20 106.6 kg (235 lb)   08/21/19 103 kg (227 lb)   05/24/19 104.8 kg (231 lb)                      Reviewed and updated as needed this visit by Provider         Review of Systems   ROS COMP: Constitutional, HEENT, cardiovascular, pulmonary, gi and gu systems are negative, except as otherwise noted.      Objective    /76 (BP Location: Right arm, Patient Position: Chair, Cuff Size: Adult Regular)   Pulse 82   Ht 1.822 m (5' 11.75\")   Wt 106.6 kg (235 lb)   SpO2 95%   BMI 32.09 kg/m    Body mass index is 32.09 kg/m .  Physical Exam   GENERAL: healthy, alert and no distress  NECK: no adenopathy, no asymmetry, masses, or scars, thyroid normal to palpation and no carotid bruits  RESP: lungs clear to auscultation - no rales, rhonchi or wheezes  CV: regular rate and rhythm, normal S1 S2, no S3 or S4, no murmur, click or rub, no peripheral edema and peripheral pulses strong  MS: no gross musculoskeletal defects noted, no edema  PSYCH: mentation appears normal, affect normal/bright            Assessment & Plan     1. Hyperlipidemia LDL goal <100  - Lipid Profile  - Comprehensive metabolic panel  - TSH with free T4 reflex  - simvastatin (ZOCOR) " "40 MG tablet; Take 1 tablet (40 mg) by mouth At Bedtime  Dispense: 90 tablet; Refill: 1    2. Generalized anxiety disorder    3. Gastroesophageal reflux disease with esophagitis  Continue current plan    4. New onset headache  - CT Head w/o Contrast; Future    5. On statin therapy  - Comprehensive metabolic panel    6. Abscessed tooth  Follow-up with dentist as scheduled.   - penicillin V (VEETID) 500 MG tablet; Take 1 tablet (500 mg) by mouth 3 times daily for 10 days  Dispense: 30 tablet; Refill: 0     BMI:   Estimated body mass index is 32.09 kg/m  as calculated from the following:    Height as of this encounter: 1.822 m (5' 11.75\").    Weight as of this encounter: 106.6 kg (235 lb).   Weight management plan: Discussed healthy diet and exercise guidelines - recommended 20 pound weight loss        Return in about 6 months (around 8/24/2020) for lipids, GERD. anxiety .    Izzy Wagner NP  [facility]      "

## 2020-02-24 ENCOUNTER — OFFICE VISIT (OUTPATIENT)
Dept: FAMILY MEDICINE | Facility: OTHER | Age: 70
End: 2020-02-24
Attending: NURSE PRACTITIONER
Payer: COMMERCIAL

## 2020-02-24 VITALS
OXYGEN SATURATION: 95 % | SYSTOLIC BLOOD PRESSURE: 122 MMHG | HEIGHT: 72 IN | HEART RATE: 82 BPM | DIASTOLIC BLOOD PRESSURE: 76 MMHG | BODY MASS INDEX: 31.83 KG/M2 | WEIGHT: 235 LBS

## 2020-02-24 DIAGNOSIS — E78.5 HYPERLIPIDEMIA LDL GOAL <100: ICD-10-CM

## 2020-02-24 DIAGNOSIS — R51.9 NEW ONSET HEADACHE: ICD-10-CM

## 2020-02-24 DIAGNOSIS — F41.1 GENERALIZED ANXIETY DISORDER: Primary | ICD-10-CM

## 2020-02-24 DIAGNOSIS — Z79.899 ON STATIN THERAPY: ICD-10-CM

## 2020-02-24 DIAGNOSIS — K21.00 GASTROESOPHAGEAL REFLUX DISEASE WITH ESOPHAGITIS: ICD-10-CM

## 2020-02-24 DIAGNOSIS — K04.7 ABSCESSED TOOTH: ICD-10-CM

## 2020-02-24 LAB
ALBUMIN SERPL-MCNC: 3.9 G/DL (ref 3.4–5)
ALP SERPL-CCNC: 55 U/L (ref 40–150)
ALT SERPL W P-5'-P-CCNC: 20 U/L (ref 0–70)
ANION GAP SERPL CALCULATED.3IONS-SCNC: 5 MMOL/L (ref 3–14)
AST SERPL W P-5'-P-CCNC: 14 U/L (ref 0–45)
BILIRUB SERPL-MCNC: 0.5 MG/DL (ref 0.2–1.3)
BUN SERPL-MCNC: 11 MG/DL (ref 7–30)
CALCIUM SERPL-MCNC: 8.7 MG/DL (ref 8.5–10.1)
CHLORIDE SERPL-SCNC: 104 MMOL/L (ref 94–109)
CHOLEST SERPL-MCNC: 147 MG/DL
CO2 SERPL-SCNC: 29 MMOL/L (ref 20–32)
CREAT SERPL-MCNC: 0.98 MG/DL (ref 0.66–1.25)
GFR SERPL CREATININE-BSD FRML MDRD: 78 ML/MIN/{1.73_M2}
GLUCOSE SERPL-MCNC: 95 MG/DL (ref 70–99)
HDLC SERPL-MCNC: 52 MG/DL
LDLC SERPL CALC-MCNC: 79 MG/DL
NONHDLC SERPL-MCNC: 95 MG/DL
POTASSIUM SERPL-SCNC: 4.3 MMOL/L (ref 3.4–5.3)
PROT SERPL-MCNC: 7.6 G/DL (ref 6.8–8.8)
SODIUM SERPL-SCNC: 138 MMOL/L (ref 133–144)
TRIGL SERPL-MCNC: 79 MG/DL
TSH SERPL DL<=0.005 MIU/L-ACNC: 2.15 MU/L (ref 0.4–4)

## 2020-02-24 PROCEDURE — 80053 COMPREHEN METABOLIC PANEL: CPT | Performed by: NURSE PRACTITIONER

## 2020-02-24 PROCEDURE — 84443 ASSAY THYROID STIM HORMONE: CPT | Performed by: NURSE PRACTITIONER

## 2020-02-24 PROCEDURE — 99214 OFFICE O/P EST MOD 30 MIN: CPT | Performed by: NURSE PRACTITIONER

## 2020-02-24 PROCEDURE — 36415 COLL VENOUS BLD VENIPUNCTURE: CPT | Performed by: NURSE PRACTITIONER

## 2020-02-24 PROCEDURE — 80061 LIPID PANEL: CPT | Performed by: NURSE PRACTITIONER

## 2020-02-24 RX ORDER — SIMVASTATIN 40 MG
40 TABLET ORAL AT BEDTIME
Qty: 90 TABLET | Refills: 1 | Status: SHIPPED | OUTPATIENT
Start: 2020-02-24 | End: 2020-10-07

## 2020-02-24 RX ORDER — PENICILLIN V POTASSIUM 500 MG/1
500 TABLET, FILM COATED ORAL 3 TIMES DAILY
Qty: 30 TABLET | Refills: 0 | Status: SHIPPED | OUTPATIENT
Start: 2020-02-24 | End: 2020-03-05

## 2020-02-24 ASSESSMENT — MIFFLIN-ST. JEOR: SCORE: 1864.98

## 2020-02-24 ASSESSMENT — ANXIETY QUESTIONNAIRES
5. BEING SO RESTLESS THAT IT IS HARD TO SIT STILL: NOT AT ALL
IF YOU CHECKED OFF ANY PROBLEMS ON THIS QUESTIONNAIRE, HOW DIFFICULT HAVE THESE PROBLEMS MADE IT FOR YOU TO DO YOUR WORK, TAKE CARE OF THINGS AT HOME, OR GET ALONG WITH OTHER PEOPLE: NOT DIFFICULT AT ALL
GAD7 TOTAL SCORE: 0
1. FEELING NERVOUS, ANXIOUS, OR ON EDGE: NOT AT ALL
6. BECOMING EASILY ANNOYED OR IRRITABLE: NOT AT ALL
4. TROUBLE RELAXING: NOT AT ALL
7. FEELING AFRAID AS IF SOMETHING AWFUL MIGHT HAPPEN: NOT AT ALL
2. NOT BEING ABLE TO STOP OR CONTROL WORRYING: NOT AT ALL
3. WORRYING TOO MUCH ABOUT DIFFERENT THINGS: NOT AT ALL

## 2020-02-24 ASSESSMENT — PATIENT HEALTH QUESTIONNAIRE - PHQ9: SUM OF ALL RESPONSES TO PHQ QUESTIONS 1-9: 14

## 2020-02-24 ASSESSMENT — PAIN SCALES - GENERAL: PAINLEVEL: NO PAIN (0)

## 2020-02-24 NOTE — NURSING NOTE
"Chief Complaint   Patient presents with     Lipids     Gastrophageal Reflux     Depression     Anxiety       Initial /76 (BP Location: Right arm, Patient Position: Chair, Cuff Size: Adult Regular)   Pulse 82   Ht 1.822 m (5' 11.75\")   Wt 106.6 kg (235 lb)   SpO2 95%   BMI 32.09 kg/m   Estimated body mass index is 32.09 kg/m  as calculated from the following:    Height as of this encounter: 1.822 m (5' 11.75\").    Weight as of this encounter: 106.6 kg (235 lb).  Medication Reconciliation: complete  Kierra Oseguera LPN    "

## 2020-02-24 NOTE — PATIENT INSTRUCTIONS
"    Assessment & Plan     1. Hyperlipidemia LDL goal <100  - Lipid Profile  - Comprehensive metabolic panel  - TSH with free T4 reflex  - simvastatin (ZOCOR) 40 MG tablet; Take 1 tablet (40 mg) by mouth At Bedtime  Dispense: 90 tablet; Refill: 1    2. Generalized anxiety disorder    3. Gastroesophageal reflux disease with esophagitis  Continue current plan    4. New onset headache  - CT Head w/o Contrast; Future    5. On statin therapy  - Comprehensive metabolic panel    6. Abscessed tooth  Follow-up with dentist as scheduled.   - penicillin V (VEETID) 500 MG tablet; Take 1 tablet (500 mg) by mouth 3 times daily for 10 days  Dispense: 30 tablet; Refill: 0     BMI:   Estimated body mass index is 32.09 kg/m  as calculated from the following:    Height as of this encounter: 1.822 m (5' 11.75\").    Weight as of this encounter: 106.6 kg (235 lb).   Weight management plan: Discussed healthy diet and exercise guidelines - recommended 20 pound weight loss        Return in about 6 months (around 8/24/2020) for lipids, GERD. anxiety .    Izzy Wagner NP  [facility]      "

## 2020-02-25 ASSESSMENT — ANXIETY QUESTIONNAIRES: GAD7 TOTAL SCORE: 0

## 2020-03-02 ENCOUNTER — HOSPITAL ENCOUNTER (OUTPATIENT)
Dept: CT IMAGING | Facility: HOSPITAL | Age: 70
Discharge: HOME | End: 2020-03-02
Attending: NURSE PRACTITIONER | Admitting: NURSE PRACTITIONER
Payer: COMMERCIAL

## 2020-03-02 DIAGNOSIS — R51.9 NEW ONSET HEADACHE: ICD-10-CM

## 2020-03-02 PROCEDURE — 70450 CT HEAD/BRAIN W/O DYE: CPT | Mod: TC

## 2020-03-11 ENCOUNTER — HEALTH MAINTENANCE LETTER (OUTPATIENT)
Age: 70
End: 2020-03-11

## 2020-04-06 DIAGNOSIS — E78.5 HYPERLIPIDEMIA LDL GOAL <100: ICD-10-CM

## 2020-04-06 RX ORDER — SIMVASTATIN 40 MG
40 TABLET ORAL AT BEDTIME
Qty: 90 TABLET | Refills: 1 | OUTPATIENT
Start: 2020-04-06

## 2020-04-06 NOTE — TELEPHONE ENCOUNTER
Zocor      Last Written Prescription Date:  02-24-20  Last Fill Quantity: 90,   # refills: 1  Last Office Visit: 2-24-20  Future Office visit:

## 2020-07-02 ENCOUNTER — MYC MEDICAL ADVICE (OUTPATIENT)
Dept: FAMILY MEDICINE | Facility: OTHER | Age: 70
End: 2020-07-02

## 2020-07-02 NOTE — TELEPHONE ENCOUNTER
"Spouse calling and trying to send pic of tick bite via email for pt.Updated cannot do that. Advised PCP is out. Pt not available at this time.No consent to discuss info with spouse.    She is concerned he has tick borne illness.She state the tick bite is a bulls eye and red.\"Yucky\" and pussy. Advised he needs to go to ED/UC.    She verbalized understanding.Please note.    Shaunna Nava RN    "

## 2020-07-15 DIAGNOSIS — K21.00 GASTROESOPHAGEAL REFLUX DISEASE WITH ESOPHAGITIS: Primary | ICD-10-CM

## 2020-07-15 RX ORDER — FAMOTIDINE 20 MG/1
20 TABLET, FILM COATED ORAL 2 TIMES DAILY
Qty: 90 TABLET | Refills: 1 | Status: SHIPPED | OUTPATIENT
Start: 2020-07-15 | End: 2020-09-04

## 2020-07-15 NOTE — TELEPHONE ENCOUNTER
[facility] states that they are unable to get Ranitidine can it be replaced with famotidine?    ranitidine (ZANTAC) 300 MG tablet       Last Written Prescription Date:  10/11/19  Last Fill Quantity: 90,   # refills: 3  Last Office Visit: 2/24/20  Future Office visit:    Next 5 appointments (look out 90 days)    Aug 24, 2020  9:00 AM CDT  (Arrive by 8:45 AM)  SHORT with Izzy Wagner NP  [facility] ([facility] ) [address]  [place] [address]  [phone]           Routing refill request to provider for review/approval because:  Drug not on the [facility], [facility] or [facility] refill protocol or controlled substance

## 2020-08-18 NOTE — PROGRESS NOTES
Subjective     Jim Brown is a 70 year old male who presents to clinic today for the following health issues:    HPI       Hyperlipidemia Follow-Up      Are you regularly taking any medication or supplement to lower your cholesterol?   Yes- zocor    Are you having muscle aches or other side effects that you think could be caused by your cholesterol lowering medication?  No   The 10-year ASCVD risk score (Juliano SHANE Jr., et al., 2013) is: 11.9%    Values used to calculate the score:      Age: 70 years      Sex: Male      Is Non- : No      Diabetic: No      Tobacco smoker: No      Systolic Blood Pressure: 110 mmHg      Is BP treated: No      HDL Cholesterol: 52 mg/dL      Total Cholesterol: 147 mg/dL        Anxiety Follow-Up    How are you doing with your anxiety since your last visit? No change    Are you having other symptoms that might be associated with anxiety? No    Have you had a significant life event? No     Are you feeling depressed? No    Do you have any concerns with your use of alcohol or other drugs? No    Social History     Tobacco Use     Smoking status: Former Smoker     Packs/day: 1.00     Years: 20.00     Pack years: 20.00     Start date: 11/27/1971     Smokeless tobacco: Former User     Types: Chew   Substance Use Topics     Alcohol use: No     Drug use: No     RAMO-7 SCORE 2/25/2019 2/24/2020 8/25/2020   Total Score 0 0 1     PHQ 2/25/2019 2/24/2020 8/25/2020   PHQ-9 Total Score 0 14 9   Q9: Thoughts of better off dead/self-harm past 2 weeks Not at all Not at all Not at all     Last PHQ-9 8/25/2020   1.  Little interest or pleasure in doing things 1   2.  Feeling down, depressed, or hopeless 1   3.  Trouble falling or staying asleep, or sleeping too much 0   4.  Feeling tired or having little energy 0   5.  Poor appetite or overeating 3   6.  Feeling bad about yourself 1   7.  Trouble concentrating 1   8.  Moving slowly or restless 2   Q9: Thoughts of better off  dead/self-harm past 2 weeks 0   PHQ-9 Total Score 9   Difficulty at work, home, or with people Not difficult at all     RAMO-7  8/25/2020   1. Feeling nervous, anxious, or on edge 0   2. Not being able to stop or control worrying 0   3. Worrying too much about different things 0   4. Trouble relaxing 0   5. Being so restless that it is hard to sit still 1   6. Becoming easily annoyed or irritable 0   7. Feeling afraid, as if something awful might happen 0   RAMO-7 Total Score 1   If you checked any problems, how difficult have they made it for you to do your work, take care of things at home, or get along with other people? Not difficult at all         How many servings of fruits and vegetables do you eat daily?  0-1    On average, how many sweetened beverages do you drink each day (Examples: soda, juice, sweet tea, etc.  Do NOT count diet or artificially sweetened beverages)?   0    How many days per week do you exercise enough to make your heart beat faster? 7    How many minutes a day do you exercise enough to make your heart beat faster? 30 - 60    How many days per week do you miss taking your medication? 0    GERD/Heartburn  Onset: years     Description:     Burning in chest: YES    Intensity: none     Progression of Symptoms: improving    Accompanying Signs & Symptoms:  Does it feel like food gets stuck: no   Nausea: no   Vomiting (bloody?): no   Abdominal Pain: no   Black-Tarry stools: no :  Bloody stools: no     History:   Previous ulcers: no     Precipitating factors:   Caffeine use: no   Alcohol use: no   NSAID/Aspirin use: no   Tobacco use: no   Worse with no particular food or drink.    Alleviating factors:  Pepcid     Therapies Tried and outcome:Pepcid     Acute Illness   Acute illness concerns: sore throat cough and body aches   Onset: week    Fever: no     Chills/Sweats: no     Headache (location?): no     Sinus Pressure:YES- at times     Conjunctivitis:  no    Ear Pain: YES: bilateral    Rhinorrhea:  no     Congestion: YES    Sore Throat: YES     Cough: YES-non-productive    Wheeze: no     Decreased Appetite: no     Nausea: no     Vomiting: no     Diarrhea:  no     Dysuria/Freq.: no     Fatigue/Achiness: YES    Sick/Strep Exposure: no      Therapies Tried and outcome: nothing        He did have a tick bite and is wondering if it is lyme.      Patient Active Problem List   Diagnosis     Hyperlipidemia LDL goal <100     Generalized anxiety disorder     Gastroesophageal reflux disease with esophagitis     Benign prostatic hyperplasia with urinary obstruction     Acute pancreatitis     Agoraphobia with panic disorder     Compression deformity of vertebra     Pericardial effusion     Personality disorder (H)     Unilateral inguinal hernia     Past Surgical History:   Procedure Laterality Date     ABDOMEN SURGERY      hernia surgerys 3-4     CHOLECYSTECTOMY  2016    galbladder      COLONOSCOPY  12/06/2018    [facility], negative for polyps 10 year      ENT SURGERY      tonsilectomy when little        Social History     Tobacco Use     Smoking status: Former Smoker     Packs/day: 1.00     Years: 20.00     Pack years: 20.00     Start date: 11/27/1971     Smokeless tobacco: Former User     Types: Chew   Substance Use Topics     Alcohol use: No     Family History   Problem Relation Age of Onset     Coronary Artery Disease Father      Other Cancer Brother          Current Outpatient Medications   Medication Sig Dispense Refill     aspirin 81 MG EC tablet Take 81 mg by mouth       famotidine (PEPCID) 20 MG tablet Take 1 tablet (20 mg) by mouth 2 times daily 90 tablet 1     Multiple Vitamin (MULTI-VITAMINS) TABS        simvastatin (ZOCOR) 40 MG tablet Take 1 tablet (40 mg) by mouth At Bedtime 90 tablet 1     sildenafil (VIAGRA) 100 MG tablet Take 1 tablet (100 mg) by mouth daily as needed (take 30 min to 4 hours as needed prior to intercourse.) (Patient not taking: Reported on 8/25/2020) 12 tablet 5     No Known  "Allergies  Recent Labs   Lab Test 02/24/20  0938 08/21/19  0952 05/24/19  0910   LDL 79 127* 99   HDL 52 59 55   TRIG 79 53 41   ALT 20 16 15   CR 0.98 0.90 0.95   GFRESTIMATED 78 86 81   GFRESTBLACK >90 >90 >90   POTASSIUM 4.3 4.4 4.2   TSH 2.15 1.52 1.07      BP Readings from Last 3 Encounters:   08/25/20 110/70   02/24/20 122/76   08/21/19 120/84    Wt Readings from Last 3 Encounters:   08/25/20 91 kg (200 lb 11.2 oz)   02/24/20 106.6 kg (235 lb)   08/21/19 103 kg (227 lb)                    Reviewed and updated as needed this visit by Provider         Review of Systems   Constitutional, HEENT, cardiovascular, pulmonary, gi and gu systems are negative, except as otherwise noted.      Objective    /70 (BP Location: Right arm, Patient Position: Chair, Cuff Size: Adult Regular)   Pulse 68   Temp 96.2  F (35.7  C) (Tympanic)   Resp 16   Ht 1.822 m (5' 11.75\")   Wt 91 kg (200 lb 11.2 oz)   SpO2 98%   BMI 27.41 kg/m    Body mass index is 27.41 kg/m .  Physical Exam   GENERAL: healthy, alert and no distress  HENT: ear canals and TM's normal, nose and mouth without ulcers or lesions, no rhinorrhea throat clear.   NECK: no adenopathy, no asymmetry, masses, or scars, thyroid normal to palpation and no carotid bruits  RESP: lungs clear to auscultation - no rales, rhonchi or wheezes  CV: regular rate and rhythm, normal S1 S2, no S3 or S4, no murmur, click or rub, no peripheral edema and peripheral pulses strong  MS: no gross musculoskeletal defects noted, no edema  PSYCH: mentation appears normal, affect normal/bright    Results for orders placed or performed in visit on 08/25/20   CBC with platelets     Status: Abnormal   Result Value Ref Range    WBC 4.2 4.0 - 11.0 10e9/L    RBC Count 4.35 (L) 4.4 - 5.9 10e12/L    Hemoglobin 14.0 13.3 - 17.7 g/dL    Hematocrit 41.6 40.0 - 53.0 %    MCV 96 78 - 100 fl    MCH 32.2 26.5 - 33.0 pg    MCHC 33.7 31.5 - 36.5 g/dL    RDW 12.7 10.0 - 15.0 %    Platelet Count 242 150 - " "450 10e9/L   Streptococcus A Rapid Scr w Reflx to PCR ([facility]/[place] Only)     Status: None    Specimen: Throat   Result Value Ref Range    Strep Specimen Description Throat     Streptococcus Group A Rapid Screen Negative NEG^Negative             Assessment & Plan     1. Hyperlipidemia LDL goal <100  - Lipid Profile  - TSH with free T4 reflex  - Basic metabolic panel  - Group A Streptococcus PCR Throat Swab    2. Gastroesophageal reflux disease with esophagitis    3. Sore throat  - Symptomatic COVID-19 Virus (Coronavirus) by PCR  - Streptococcus A Rapid Scr w Reflx to PCR ([facility] Only) - negative  - CBC with platelets - negative    4. Myalgia  - Lyme Disease Rose with reflex to WB Serum    5. Scrotal skin lesion  Dr Espinosa   - UROLOGY ADULT REFERRAL; Future       BMI:   Estimated body mass index is 27.41 kg/m  as calculated from the following:    Height as of this encounter: 1.822 m (5' 11.75\").    Weight as of this encounter: 91 kg (200 lb 11.2 oz).   Weight management plan: Discussed healthy diet and exercise guidelines        Return in about 6 months (around 2/25/2021).    Izyz Wagner NP  [facility]  "

## 2020-08-25 ENCOUNTER — OFFICE VISIT (OUTPATIENT)
Dept: FAMILY MEDICINE | Facility: OTHER | Age: 70
End: 2020-08-25
Attending: NURSE PRACTITIONER
Payer: COMMERCIAL

## 2020-08-25 VITALS
DIASTOLIC BLOOD PRESSURE: 70 MMHG | HEART RATE: 68 BPM | HEIGHT: 72 IN | TEMPERATURE: 96.2 F | BODY MASS INDEX: 27.19 KG/M2 | RESPIRATION RATE: 16 BRPM | WEIGHT: 200.7 LBS | SYSTOLIC BLOOD PRESSURE: 110 MMHG | OXYGEN SATURATION: 98 %

## 2020-08-25 DIAGNOSIS — M79.10 MYALGIA: ICD-10-CM

## 2020-08-25 DIAGNOSIS — N50.9 SCROTAL SKIN LESION: ICD-10-CM

## 2020-08-25 DIAGNOSIS — K21.00 GASTROESOPHAGEAL REFLUX DISEASE WITH ESOPHAGITIS: ICD-10-CM

## 2020-08-25 DIAGNOSIS — E78.5 HYPERLIPIDEMIA LDL GOAL <100: Primary | ICD-10-CM

## 2020-08-25 DIAGNOSIS — J02.9 SORE THROAT: ICD-10-CM

## 2020-08-25 LAB
DEPRECATED S PYO AG THROAT QL EIA: NEGATIVE
ERYTHROCYTE [DISTWIDTH] IN BLOOD BY AUTOMATED COUNT: 12.7 % (ref 10–15)
HCT VFR BLD AUTO: 41.6 % (ref 40–53)
HGB BLD-MCNC: 14 G/DL (ref 13.3–17.7)
MCH RBC QN AUTO: 32.2 PG (ref 26.5–33)
MCHC RBC AUTO-ENTMCNC: 33.7 G/DL (ref 31.5–36.5)
MCV RBC AUTO: 96 FL (ref 78–100)
PLATELET # BLD AUTO: 242 10E9/L (ref 150–450)
RBC # BLD AUTO: 4.35 10E12/L (ref 4.4–5.9)
SPECIMEN SOURCE: NORMAL
WBC # BLD AUTO: 4.2 10E9/L (ref 4–11)

## 2020-08-25 PROCEDURE — 40001204 ZZHCL STATISTIC STREP A RAPID: Performed by: NURSE PRACTITIONER

## 2020-08-25 PROCEDURE — 85027 COMPLETE CBC AUTOMATED: CPT | Performed by: NURSE PRACTITIONER

## 2020-08-25 PROCEDURE — U0003 INFECTIOUS AGENT DETECTION BY NUCLEIC ACID (DNA OR RNA); SEVERE ACUTE RESPIRATORY SYNDROME CORONAVIRUS 2 (SARS-COV-2) (CORONAVIRUS DISEASE [COVID-19]), AMPLIFIED PROBE TECHNIQUE, MAKING USE OF HIGH THROUGHPUT TECHNOLOGIES AS DESCRIBED BY CMS-2020-01-R: HCPCS | Performed by: NURSE PRACTITIONER

## 2020-08-25 PROCEDURE — 80061 LIPID PANEL: CPT | Performed by: NURSE PRACTITIONER

## 2020-08-25 PROCEDURE — 86618 LYME DISEASE ANTIBODY: CPT | Performed by: NURSE PRACTITIONER

## 2020-08-25 PROCEDURE — 99214 OFFICE O/P EST MOD 30 MIN: CPT | Performed by: NURSE PRACTITIONER

## 2020-08-25 PROCEDURE — 84443 ASSAY THYROID STIM HORMONE: CPT | Performed by: NURSE PRACTITIONER

## 2020-08-25 PROCEDURE — 36415 COLL VENOUS BLD VENIPUNCTURE: CPT | Performed by: NURSE PRACTITIONER

## 2020-08-25 PROCEDURE — 80048 BASIC METABOLIC PNL TOTAL CA: CPT | Performed by: NURSE PRACTITIONER

## 2020-08-25 PROCEDURE — 87651 STREP A DNA AMP PROBE: CPT | Performed by: NURSE PRACTITIONER

## 2020-08-25 ASSESSMENT — ANXIETY QUESTIONNAIRES
GAD7 TOTAL SCORE: 1
4. TROUBLE RELAXING: NOT AT ALL
7. FEELING AFRAID AS IF SOMETHING AWFUL MIGHT HAPPEN: NOT AT ALL
2. NOT BEING ABLE TO STOP OR CONTROL WORRYING: NOT AT ALL
IF YOU CHECKED OFF ANY PROBLEMS ON THIS QUESTIONNAIRE, HOW DIFFICULT HAVE THESE PROBLEMS MADE IT FOR YOU TO DO YOUR WORK, TAKE CARE OF THINGS AT HOME, OR GET ALONG WITH OTHER PEOPLE: NOT DIFFICULT AT ALL
1. FEELING NERVOUS, ANXIOUS, OR ON EDGE: NOT AT ALL
3. WORRYING TOO MUCH ABOUT DIFFERENT THINGS: NOT AT ALL
5. BEING SO RESTLESS THAT IT IS HARD TO SIT STILL: SEVERAL DAYS
6. BECOMING EASILY ANNOYED OR IRRITABLE: NOT AT ALL

## 2020-08-25 ASSESSMENT — MIFFLIN-ST. JEOR: SCORE: 1704.4

## 2020-08-25 ASSESSMENT — PAIN SCALES - GENERAL: PAINLEVEL: SEVERE PAIN (6)

## 2020-08-25 ASSESSMENT — PATIENT HEALTH QUESTIONNAIRE - PHQ9: SUM OF ALL RESPONSES TO PHQ QUESTIONS 1-9: 9

## 2020-08-25 NOTE — NURSING NOTE
"Chief Complaint   Patient presents with     Lipids     Cough     Generalized Body Aches     Gastrophageal Reflux       Initial /70 (BP Location: Right arm, Patient Position: Chair, Cuff Size: Adult Regular)   Pulse 68   Temp 96.2  F (35.7  C) (Tympanic)   Resp 16   Ht 1.822 m (5' 11.75\")   Wt 91 kg (200 lb 11.2 oz)   SpO2 98%   BMI 27.41 kg/m   Estimated body mass index is 27.41 kg/m  as calculated from the following:    Height as of this encounter: 1.822 m (5' 11.75\").    Weight as of this encounter: 91 kg (200 lb 11.2 oz).  Medication Reconciliation: complete  Pamela M. Lechevalier, LPN    "

## 2020-08-25 NOTE — PATIENT INSTRUCTIONS
"  Assessment & Plan     1. Hyperlipidemia LDL goal <100  - Lipid Profile  - TSH with free T4 reflex  - Basic metabolic panel  - Group A Streptococcus PCR Throat Swab    2. Gastroesophageal reflux disease with esophagitis    3. Sore throat  - Symptomatic COVID-19 Virus (Coronavirus) by PCR  - Streptococcus A Rapid Scr w Reflx to PCR ([facility] Only) - negative  - CBC with platelets - negative    4. Myalgia  - Lyme Disease Rose with reflex to WB Serum    5. Scrotal skin lesion  Dr Espinosa   - UROLOGY ADULT REFERRAL; Future       BMI:   Estimated body mass index is 27.41 kg/m  as calculated from the following:    Height as of this encounter: 1.822 m (5' 11.75\").    Weight as of this encounter: 91 kg (200 lb 11.2 oz).   Weight management plan: Discussed healthy diet and exercise guidelines        Return in about 6 months (around 2/25/2021).    Izzy Wagner, NP  [facility]  "

## 2020-08-26 ENCOUNTER — MYC MEDICAL ADVICE (OUTPATIENT)
Dept: FAMILY MEDICINE | Facility: OTHER | Age: 70
End: 2020-08-26

## 2020-08-26 LAB
ANION GAP SERPL CALCULATED.3IONS-SCNC: 5 MMOL/L (ref 3–14)
BUN SERPL-MCNC: 13 MG/DL (ref 7–30)
CALCIUM SERPL-MCNC: 9.1 MG/DL (ref 8.5–10.1)
CHLORIDE SERPL-SCNC: 102 MMOL/L (ref 94–109)
CHOLEST SERPL-MCNC: 150 MG/DL
CO2 SERPL-SCNC: 28 MMOL/L (ref 20–32)
CREAT SERPL-MCNC: 0.76 MG/DL (ref 0.66–1.25)
GFR SERPL CREATININE-BSD FRML MDRD: >90 ML/MIN/{1.73_M2}
GLUCOSE SERPL-MCNC: 95 MG/DL (ref 70–99)
HDLC SERPL-MCNC: 53 MG/DL
LDLC SERPL CALC-MCNC: 87 MG/DL
NONHDLC SERPL-MCNC: 97 MG/DL
POTASSIUM SERPL-SCNC: 3.9 MMOL/L (ref 3.4–5.3)
SODIUM SERPL-SCNC: 135 MMOL/L (ref 133–144)
SPECIMEN SOURCE: NORMAL
STREP GROUP A PCR: NOT DETECTED
TRIGL SERPL-MCNC: 52 MG/DL
TSH SERPL DL<=0.005 MIU/L-ACNC: 1.57 MU/L (ref 0.4–4)

## 2020-08-26 ASSESSMENT — ANXIETY QUESTIONNAIRES: GAD7 TOTAL SCORE: 1

## 2020-08-27 LAB
B BURGDOR IGG+IGM SER QL: 0.04 (ref 0–0.89)
SARS-COV-2 RNA SPEC QL NAA+PROBE: NOT DETECTED
SPECIMEN SOURCE: NORMAL

## 2020-09-14 NOTE — NURSING NOTE
"Chief Complaint   Patient presents with     Establish Care       Initial /70 (BP Location: Right arm, Patient Position: Chair, Cuff Size: Adult Large)  Pulse 68  Temp 97.1  F (36.2  C) (Tympanic)  Resp 16  Ht 5' 11.75\" (1.822 m)  Wt 222 lb 9.6 oz (101 kg)  SpO2 97%  BMI 30.4 kg/m2 Estimated body mass index is 30.4 kg/(m^2) as calculated from the following:    Height as of this encounter: 5' 11.75\" (1.822 m).    Weight as of this encounter: 222 lb 9.6 oz (101 kg).  Medication Reconciliation: complete    Pamela M. Lechevalier, LPN    " Cellcept Pregnancy And Lactation Text: This medication is Pregnancy Category D and isn't considered safe during pregnancy. It is unknown if this medication is excreted in breast milk.

## 2020-09-18 ENCOUNTER — OFFICE VISIT (OUTPATIENT)
Dept: UROLOGY | Facility: OTHER | Age: 70
End: 2020-09-18
Attending: NURSE PRACTITIONER
Payer: MEDICARE

## 2020-09-18 VITALS
OXYGEN SATURATION: 98 % | WEIGHT: 192 LBS | HEART RATE: 66 BPM | TEMPERATURE: 95.6 F | BODY MASS INDEX: 26.01 KG/M2 | DIASTOLIC BLOOD PRESSURE: 60 MMHG | SYSTOLIC BLOOD PRESSURE: 130 MMHG | HEIGHT: 72 IN

## 2020-09-18 DIAGNOSIS — N50.9 SCROTAL SKIN LESION: ICD-10-CM

## 2020-09-18 PROCEDURE — 99202 OFFICE O/P NEW SF 15 MIN: CPT | Mod: 25 | Performed by: UROLOGY

## 2020-09-18 PROCEDURE — G0463 HOSPITAL OUTPT CLINIC VISIT: HCPCS | Mod: 25

## 2020-09-18 PROCEDURE — 11420 EXC H-F-NK-SP B9+MARG 0.5/<: CPT

## 2020-09-18 PROCEDURE — 11420 EXC H-F-NK-SP B9+MARG 0.5/<: CPT | Performed by: UROLOGY

## 2020-09-18 PROCEDURE — G0463 HOSPITAL OUTPT CLINIC VISIT: HCPCS

## 2020-09-18 ASSESSMENT — MIFFLIN-ST. JEOR: SCORE: 1668.91

## 2020-09-18 ASSESSMENT — PAIN SCALES - GENERAL: PAINLEVEL: NO PAIN (0)

## 2020-09-18 NOTE — PROGRESS NOTES
History     Chief Complaint:    Lesion (Scrotal lesion)      HPI   Jim Brown is a 70 year old male who presents with a scrotal skin lesion.  Jim noticed that this summer and is on the left side of his scrotum.  He did not have any trauma and he has had no evidence of any infection and it does not seem to really be bothering him other than it is there and he was concerned it could be something serious.  He is never had any surgery on his scrotum or testicles.    Allergies:    No Known Allergies     Medications:      aspirin 81 MG EC tablet  cimetidine (TAGAMET) 200 MG tablet  Multiple Vitamin (MULTI-VITAMINS) TABS  sildenafil (VIAGRA) 100 MG tablet  simvastatin (ZOCOR) 40 MG tablet        Problem List:      Patient Active Problem List    Diagnosis Date Noted     Hyperlipidemia LDL goal <100 08/27/2018     Priority: Medium     Gastroesophageal reflux disease with esophagitis 03/02/2016     Priority: Medium     Acute pancreatitis 03/02/2016     Priority: Medium     Compression deformity of vertebra 03/02/2016     Priority: Medium     Pericardial effusion 03/02/2016     Priority: Medium     Benign prostatic hyperplasia with urinary obstruction 04/08/2009     Priority: Medium     Overview:   IMO Update 10/11  Updated per 10/1/17 IMO import       Unilateral inguinal hernia 08/08/2008     Priority: Medium     Overview:   IMO Update    Overview:   IMO Update 10/11       Generalized anxiety disorder 07/14/2008     Priority: Medium     Agoraphobia with panic disorder 07/14/2008     Priority: Medium     Personality disorder (H) 07/14/2008     Priority: Medium        Past Medical History:      Past Medical History:   Diagnosis Date     Arthritis      Depressive disorder        Past Surgical History:      Past Surgical History:   Procedure Laterality Date     ABDOMEN SURGERY      hernia surgerys 3-4     CHOLECYSTECTOMY  2016    galbladder      COLONOSCOPY  12/06/2018    [facility], negative for polyps 10 year      ENT  SURGERY      tonsilectomy when little        Family History:      Family History   Problem Relation Age of Onset     Coronary Artery Disease Father      Other Cancer Brother        Social History:    Marital Status:   [2]  Social History     Tobacco Use     Smoking status: Former Smoker     Packs/day: 1.00     Years: 20.00     Pack years: 20.00     Start date: 11/27/1971     Smokeless tobacco: Former User     Types: Chew   Substance Use Topics     Alcohol use: No     Drug use: No        ROS: negative    Physical Exam   Vitals:  /60 (BP Location: Right arm, Patient Position: Chair, Cuff Size: Adult Regular)   Pulse 66   Temp 95.6  F (35.3  C) (Tympanic)   Ht 1.829 m (6')   Wt 87.1 kg (192 lb)   SpO2 98%   BMI 26.04 kg/m        Physical Exam  Constitutional:       Appearance: Normal appearance. He is normal weight.   Pulmonary:      Effort: Pulmonary effort is normal.   Abdominal:      General: Abdomen is flat. There is no distension.      Palpations: Abdomen is soft. There is no mass.      Tenderness: There is no abdominal tenderness.      Hernia: No hernia is present. There is no hernia in the left inguinal area or right inguinal area.   Genitourinary:     Penis: Normal and circumcised.       Scrotum/Testes: Normal.         Right: Mass, tenderness or swelling not present.         Left: Mass, tenderness or swelling not present.      Epididymis:      Right: Normal.      Left: Normal.          Comments: There is a moderate sized sebaceous cyst in the left scrotal skin.  Neurological:      Mental Status: He is alert.     Procedure: Patient had a desire to have this removed.  I went ahead and just took some Betadine and the area.  I used a 25-gauge needle and just unroofed the cyst and then removed the skin cells and debris out of the cyst. The size of the cyst is approximately 6 mm.  I then removed a portion of the cystic wall so that hopefully this will not recur.  She tolerated it very well.  He is  instructed to keep it clean and dry.    Impression: Scrotal sebaceous cyst approximately 6 mm      Plan   Plan: Patient was instructed to keep the area clean and dry and will follow-up PRN. Reassurance given.      No follow-ups on file.    Evelyne Espinosa MD  [facility]

## 2020-09-18 NOTE — NURSING NOTE
Chief Complaint   Patient presents with     Lesion     Scrotal lesion       Initial /60 (BP Location: Right arm, Patient Position: Chair, Cuff Size: Adult Regular)   Pulse 66   Temp 95.6  F (35.3  C) (Tympanic)   Ht 1.829 m (6')   Wt 87.1 kg (192 lb)   SpO2 98%   BMI 26.04 kg/m   Estimated body mass index is 26.04 kg/m  as calculated from the following:    Height as of this encounter: 1.829 m (6').    Weight as of this encounter: 87.1 kg (192 lb).  Medication Reconciliation: complete  YULY Duncan LPN    Review of Systems:    Weight loss:    yes     Recent fever/chills:  No   Night sweats:   No  Current skin rash:  No   Recent hair loss:  No  Heat intolerance:  No   Cold intolerance:  No  Chest pain:   No   Palpitations:   No  Shortness of breath:  No   Wheezing:   No  Constipation:    No   Diarrhea:   No   Nausea:   No   Vomiting:   No   Kidney/side pain:  No   Back pain:   No  Frequent headaches:  No   Dizziness:     No  Leg swelling:   No   Calf pain:    No    Parents, brothers or sisters with history of kidney cancer:   No  Parents, brothers or sisters with history of bladder cancer: No    YULY DUNCAN LPN

## 2021-01-03 ENCOUNTER — HEALTH MAINTENANCE LETTER (OUTPATIENT)
Age: 71
End: 2021-01-03

## 2021-01-20 ENCOUNTER — MYC MEDICAL ADVICE (OUTPATIENT)
Dept: FAMILY MEDICINE | Facility: OTHER | Age: 71
End: 2021-01-20

## 2021-04-09 DIAGNOSIS — E78.5 HYPERLIPIDEMIA LDL GOAL <100: ICD-10-CM

## 2021-04-09 RX ORDER — SIMVASTATIN 40 MG
40 TABLET ORAL AT BEDTIME
Qty: 90 TABLET | Refills: 0 | Status: SHIPPED | OUTPATIENT
Start: 2021-04-09 | End: 2021-04-29

## 2021-04-21 DIAGNOSIS — N52.9 ERECTILE DYSFUNCTION, UNSPECIFIED ERECTILE DYSFUNCTION TYPE: ICD-10-CM

## 2021-04-21 RX ORDER — SILDENAFIL 100 MG/1
100 TABLET, FILM COATED ORAL DAILY PRN
Qty: 12 TABLET | Refills: 5 | Status: SHIPPED | OUTPATIENT
Start: 2021-04-21 | End: 2024-01-17

## 2021-04-21 NOTE — TELEPHONE ENCOUNTER
Sildenafil (VIAGRA) 100 MG      Last Written Prescription Date:  8/21/2019  Last Fill Quantity: 12,   # refills: 5  Last Office Visit: 8/25/2020  Future Office visit:       Routing refill request to provider for review/approval because:

## 2021-04-25 ENCOUNTER — HEALTH MAINTENANCE LETTER (OUTPATIENT)
Age: 71
End: 2021-04-25

## 2021-04-28 NOTE — PROGRESS NOTES
"    Assessment & Plan     1. Annual physical exam    2. Hyperlipidemia LDL goal <100  - Lipid Profile  - Comprehensive metabolic panel  - TSH with free T4 reflex  - CBC with platelets differential    3. Gastroesophageal reflux disease with esophagitis without hemorrhage  - famotidine (PEPCID) 20 MG tablet; Take 1 tablet (20 mg) by mouth 2 times daily  Dispense: 180 tablet; Refill: 1    4. Prostate cancer screening  - PSA, screen       BMI:   Estimated body mass index is 27.44 kg/m  as calculated from the following:    Height as of this encounter: 1.829 m (6').    Weight as of this encounter: 91.8 kg (202 lb 4.8 oz).       Return in about 6 months (around 10/29/2021) for lipids.    Izzy Wagner NP  [facility] - JUANCHO Fernandez is a 71 year old who presents for the following health issues     HPI     Annual Wellness Visit  Patient has been advised of split billing requirements and indicates understanding: Yes   Are you in the first 12 months of your Medicare Part B coverage?  No    Physical Health:    In general, how would you rate your overall physical health? excellent    Outside of work, how many days during the week do you exercise?6-7 days/week    Outside of work, approximately how many minutes a day do you exercise?greater than 60 minutes    If you drink alcohol do you typically have >3 drinks per day or >7 drinks per week? Not Applicable    Do you usually eat at least 4 servings of fruit and vegetables a day, include whole grains & fiber and avoid regularly eating high fat or \"junk\" foods? Yes    Do you have any problems taking medications regularly? No    Do you have any side effects from medications? none    Needs assistance for the following daily activities: no assistance needed    Which of the following safety concerns are present in your home?  none identified     Hearing impairment: No    In the past 6 months, have you been bothered by leaking of urine? no    Mental " Health:    In general, how would you rate your overall mental or emotional health? good  PHQ-2 Score:      Do you feel safe in your environment? Yes    Have you ever done Advance Care Planning? (For example, a Health Directive, POLST, or a discussion with a medical provider or your loved ones about your wishes)? No, advance care planning information given to patient to review.  Patient declined advance care planning discussion at this time.    Fall risk:  Fallen 2 or more times in the past year?: No  Any fall with injury in the past year?: No    Cognitive Screenin) Repeat 3 items (Leader, Season, Table)    2) Clock draw: NORMAL  3) 3 item recall: Recalls 3 objects  Results: 3 items recalled: COGNITIVE IMPAIRMENT LESS LIKELY    Mini-CogTM Copyright BHAVANI Jamison. Licensed by the author for use in [facility] [employer]; reprinted with permission (jennifer@.[facility]). All rights reserved.      Do you have sleep apnea, excessive snoring or daytime drowsiness?: no    Current providers sharing in care for this patient include:   Patient Care Team:  Izzy Wagner NP as PCP - General (Family Practice)  Izzy Wagner NP as Assigned PCP  Evelyne Espinosa MD as Assigned Surgical Provider    Patient has been advised of split billing requirements and indicates understanding: Yes         Patient Active Problem List   Diagnosis     Hyperlipidemia LDL goal <100     Generalized anxiety disorder     Gastroesophageal reflux disease with esophagitis     Benign prostatic hyperplasia with urinary obstruction     Acute pancreatitis     Agoraphobia with panic disorder     Compression deformity of vertebra     Pericardial effusion     Personality disorder (H)     Unilateral inguinal hernia     Past Surgical History:   Procedure Laterality Date     ABDOMEN SURGERY      hernia surgerys 3-4     CHOLECYSTECTOMY  2016    galbladder      COLONOSCOPY  2018    [facility], negative for polyps 10 year      ENT SURGERY       tonsilectomy when little        Social History     Tobacco Use     Smoking status: Former Smoker     Packs/day: 1.00     Years: 20.00     Pack years: 20.00     Start date: 11/27/1971     Smokeless tobacco: Former User     Types: Chew   Substance Use Topics     Alcohol use: No     Family History   Problem Relation Age of Onset     Coronary Artery Disease Father      Other Cancer Brother          Current Outpatient Medications   Medication Sig Dispense Refill     aspirin 81 MG EC tablet Take 81 mg by mouth       famotidine (PEPCID) 20 MG tablet Take 1 tablet (20 mg) by mouth 2 times daily 180 tablet 1     Multiple Vitamin (MULTI-VITAMINS) TABS        sildenafil (VIAGRA) 100 MG tablet TAKE 1 TABLET (100 MG) BY MOUTH DAILY AS NEEDED (TAKE 30 MIN TO 4 HOURS AS NEEDED PRIOR TO INTERCOURSE.) 12 tablet 5     simvastatin (ZOCOR) 40 MG tablet TAKE 1 TABLET (40 MG) BY MOUTH AT BEDTIME 90 tablet 0     No Known Allergies  Recent Labs   Lab Test 08/25/20  1634 02/24/20  0938 08/21/19  0952 05/24/19  0910   LDL 87 79 127* 99   HDL 53 52 59 55   TRIG 52 79 53 41   ALT  --  20 16 15   CR 0.76 0.98 0.90 0.95   GFRESTIMATED >90 78 86 81   GFRESTBLACK >90 >90 >90 >90   POTASSIUM 3.9 4.3 4.4 4.2   TSH 1.57 2.15 1.52 1.07      BP Readings from Last 3 Encounters:   04/29/21 118/68   09/18/20 130/60   08/25/20 110/70    Wt Readings from Last 3 Encounters:   04/29/21 91.8 kg (202 lb 4.8 oz)   09/18/20 87.1 kg (192 lb)   08/25/20 91 kg (200 lb 11.2 oz)            PSA   Date Value Ref Range Status   08/21/2019 1.13 0 - 4 ug/L Final     Comment:     Assay Method:  Chemiluminescence using Siemens Vista analyzer               Review of Systems   Constitutional, HEENT, cardiovascular, pulmonary, gi and gu systems are negative, except as otherwise noted.      Objective    /68   Pulse 68   Temp 96.8  F (36  C) (Tympanic)   Resp 16   Ht 1.829 m (6')   Wt 91.8 kg (202 lb 4.8 oz)   SpO2 98%   BMI 27.44 kg/m    Body mass index is 27.44  kg/m .  Physical Exam   GENERAL: healthy, alert and no distress  HENT: ear canals and TM's normal, nose and mouth without ulcers or lesions  NECK: no adenopathy, no asymmetry, masses, or scars, thyroid normal to palpation and no carotid bruits  RESP: lungs clear to auscultation - no rales, rhonchi or wheezes  CV: regular rate and rhythm, normal S1 S2, no S3 or S4, no murmur, click or rub, no peripheral edema and peripheral pulses strong  MS: no gross musculoskeletal defects noted, no edema  SKIN: no suspicious lesions or rashes  PSYCH: mentation appears normal, affect normal/bright

## 2021-04-29 ENCOUNTER — OFFICE VISIT (OUTPATIENT)
Dept: FAMILY MEDICINE | Facility: OTHER | Age: 71
End: 2021-04-29
Attending: NURSE PRACTITIONER
Payer: COMMERCIAL

## 2021-04-29 VITALS
RESPIRATION RATE: 16 BRPM | BODY MASS INDEX: 27.4 KG/M2 | HEIGHT: 72 IN | DIASTOLIC BLOOD PRESSURE: 68 MMHG | HEART RATE: 68 BPM | TEMPERATURE: 96.8 F | OXYGEN SATURATION: 98 % | SYSTOLIC BLOOD PRESSURE: 118 MMHG | WEIGHT: 202.3 LBS

## 2021-04-29 DIAGNOSIS — K21.00 GASTROESOPHAGEAL REFLUX DISEASE WITH ESOPHAGITIS WITHOUT HEMORRHAGE: ICD-10-CM

## 2021-04-29 DIAGNOSIS — Z12.5 PROSTATE CANCER SCREENING: ICD-10-CM

## 2021-04-29 DIAGNOSIS — Z00.00 ANNUAL PHYSICAL EXAM: Primary | ICD-10-CM

## 2021-04-29 DIAGNOSIS — Z00.00 ENCOUNTER FOR MEDICARE ANNUAL WELLNESS EXAM: ICD-10-CM

## 2021-04-29 DIAGNOSIS — E78.5 HYPERLIPIDEMIA LDL GOAL <100: ICD-10-CM

## 2021-04-29 LAB
BASOPHILS # BLD AUTO: 0 10E9/L (ref 0–0.2)
BASOPHILS NFR BLD AUTO: 0.5 %
DIFFERENTIAL METHOD BLD: NORMAL
EOSINOPHIL # BLD AUTO: 0.1 10E9/L (ref 0–0.7)
EOSINOPHIL NFR BLD AUTO: 1.2 %
ERYTHROCYTE [DISTWIDTH] IN BLOOD BY AUTOMATED COUNT: 13.3 % (ref 10–15)
HCT VFR BLD AUTO: 41.9 % (ref 40–53)
HGB BLD-MCNC: 14 G/DL (ref 13.3–17.7)
LYMPHOCYTES # BLD AUTO: 1.8 10E9/L (ref 0.8–5.3)
LYMPHOCYTES NFR BLD AUTO: 43 %
MCH RBC QN AUTO: 31.7 PG (ref 26.5–33)
MCHC RBC AUTO-ENTMCNC: 33.4 G/DL (ref 31.5–36.5)
MCV RBC AUTO: 95 FL (ref 78–100)
MONOCYTES # BLD AUTO: 0.6 10E9/L (ref 0–1.3)
MONOCYTES NFR BLD AUTO: 13.2 %
NEUTROPHILS # BLD AUTO: 1.8 10E9/L (ref 1.6–8.3)
NEUTROPHILS NFR BLD AUTO: 42.1 %
PLATELET # BLD AUTO: 195 10E9/L (ref 150–450)
RBC # BLD AUTO: 4.41 10E12/L (ref 4.4–5.9)
WBC # BLD AUTO: 4.2 10E9/L (ref 4–11)

## 2021-04-29 PROCEDURE — 80053 COMPREHEN METABOLIC PANEL: CPT | Mod: ZL | Performed by: NURSE PRACTITIONER

## 2021-04-29 PROCEDURE — G0463 HOSPITAL OUTPT CLINIC VISIT: HCPCS

## 2021-04-29 PROCEDURE — 84443 ASSAY THYROID STIM HORMONE: CPT | Mod: ZL | Performed by: NURSE PRACTITIONER

## 2021-04-29 PROCEDURE — 80061 LIPID PANEL: CPT | Mod: ZL | Performed by: NURSE PRACTITIONER

## 2021-04-29 PROCEDURE — 85025 COMPLETE CBC W/AUTO DIFF WBC: CPT | Mod: ZL | Performed by: NURSE PRACTITIONER

## 2021-04-29 PROCEDURE — 36415 COLL VENOUS BLD VENIPUNCTURE: CPT | Mod: ZL | Performed by: NURSE PRACTITIONER

## 2021-04-29 PROCEDURE — G0438 PPPS, INITIAL VISIT: HCPCS | Performed by: NURSE PRACTITIONER

## 2021-04-29 PROCEDURE — G0103 PSA SCREENING: HCPCS | Mod: ZL | Performed by: NURSE PRACTITIONER

## 2021-04-29 RX ORDER — SIMVASTATIN 40 MG
40 TABLET ORAL AT BEDTIME
Qty: 90 TABLET | Refills: 1 | Status: SHIPPED | OUTPATIENT
Start: 2021-04-29 | End: 2021-09-23

## 2021-04-29 RX ORDER — FAMOTIDINE 20 MG/1
20 TABLET, FILM COATED ORAL 2 TIMES DAILY
Qty: 180 TABLET | Refills: 1 | COMMUNITY
Start: 2021-04-29 | End: 2021-05-27

## 2021-04-29 ASSESSMENT — MIFFLIN-ST. JEOR: SCORE: 1710.63

## 2021-04-29 ASSESSMENT — PAIN SCALES - GENERAL: PAINLEVEL: NO PAIN (0)

## 2021-04-29 NOTE — PATIENT INSTRUCTIONS
Assessment & Plan     1. Annual physical exam    2. Hyperlipidemia LDL goal <100  - Lipid Profile  - Comprehensive metabolic panel  - TSH with free T4 reflex  - CBC with platelets differential    3. Gastroesophageal reflux disease with esophagitis without hemorrhage  - famotidine (PEPCID) 20 MG tablet; Take 1 tablet (20 mg) by mouth 2 times daily  Dispense: 180 tablet; Refill: 1    4. Prostate cancer screening  - PSA, screen       BMI:   Estimated body mass index is 27.44 kg/m  as calculated from the following:    Height as of this encounter: 1.829 m (6').    Weight as of this encounter: 91.8 kg (202 lb 4.8 oz).       Return in about 6 months (around 10/29/2021) for lipids.    Izzy Wagner NP  [facility] [place] [facility]    Patient Education   Personalized Prevention Plan  You are due for the preventive services outlined below.  Your care team is available to assist you in scheduling these services.  If you have already completed any of these items, please share that information with your care team to update in your medical record.  Health Maintenance Due   Topic Date Due     Zoster (Shingles) Vaccine (1 of 2) Never done     Flu Vaccine (1) 09/01/2020

## 2021-04-29 NOTE — NURSING NOTE
Chief Complaint   Patient presents with     Physical       Initial /68   Pulse 68   Temp 96.8  F (36  C) (Tympanic)   Resp 16   Ht 1.829 m (6')   Wt 91.8 kg (202 lb 4.8 oz)   SpO2 98%   BMI 27.44 kg/m   Estimated body mass index is 27.44 kg/m  as calculated from the following:    Height as of this encounter: 1.829 m (6').    Weight as of this encounter: 91.8 kg (202 lb 4.8 oz).  Medication Reconciliation: complete  Annmarie Mckeon LPN

## 2021-04-30 LAB
ALBUMIN SERPL-MCNC: 3.9 G/DL (ref 3.4–5)
ALP SERPL-CCNC: 48 U/L (ref 40–150)
ALT SERPL W P-5'-P-CCNC: 18 U/L (ref 0–70)
ANION GAP SERPL CALCULATED.3IONS-SCNC: 5 MMOL/L (ref 3–14)
AST SERPL W P-5'-P-CCNC: 12 U/L (ref 0–45)
BILIRUB SERPL-MCNC: 0.3 MG/DL (ref 0.2–1.3)
BUN SERPL-MCNC: 13 MG/DL (ref 7–30)
CALCIUM SERPL-MCNC: 8.9 MG/DL (ref 8.5–10.1)
CHLORIDE SERPL-SCNC: 103 MMOL/L (ref 94–109)
CHOLEST SERPL-MCNC: 162 MG/DL
CO2 SERPL-SCNC: 29 MMOL/L (ref 20–32)
CREAT SERPL-MCNC: 0.82 MG/DL (ref 0.66–1.25)
GFR SERPL CREATININE-BSD FRML MDRD: 89 ML/MIN/{1.73_M2}
GLUCOSE SERPL-MCNC: 90 MG/DL (ref 70–99)
HDLC SERPL-MCNC: 66 MG/DL
LDLC SERPL CALC-MCNC: 86 MG/DL
NONHDLC SERPL-MCNC: 96 MG/DL
POTASSIUM SERPL-SCNC: 4.1 MMOL/L (ref 3.4–5.3)
PROT SERPL-MCNC: 7.5 G/DL (ref 6.8–8.8)
PSA SERPL-ACNC: 0.97 UG/L (ref 0–4)
SODIUM SERPL-SCNC: 137 MMOL/L (ref 133–144)
TRIGL SERPL-MCNC: 48 MG/DL
TSH SERPL DL<=0.005 MIU/L-ACNC: 1.53 MU/L (ref 0.4–4)

## 2021-05-27 DIAGNOSIS — K21.00 GASTROESOPHAGEAL REFLUX DISEASE WITH ESOPHAGITIS WITHOUT HEMORRHAGE: ICD-10-CM

## 2021-05-27 RX ORDER — FAMOTIDINE 20 MG/1
20 TABLET, FILM COATED ORAL 2 TIMES DAILY
Qty: 90 TABLET | Refills: 1 | Status: SHIPPED | OUTPATIENT
Start: 2021-05-27 | End: 2021-09-14

## 2021-05-27 NOTE — TELEPHONE ENCOUNTER
Famotidine (PEPCID) 20MG      Last Written Prescription Date:  4/29/2021  Last Fill Quantity: 180,   # refills: 1  Last Office Visit: 4/29/2021  Future Office visit:       Routing refill request to provider for review/approval because:

## 2021-08-16 ENCOUNTER — OFFICE VISIT (OUTPATIENT)
Dept: FAMILY MEDICINE | Facility: OTHER | Age: 71
End: 2021-08-16
Attending: NURSE PRACTITIONER
Payer: MEDICARE

## 2021-08-16 ENCOUNTER — ANCILLARY PROCEDURE (OUTPATIENT)
Dept: GENERAL RADIOLOGY | Facility: OTHER | Age: 71
End: 2021-08-16
Attending: NURSE PRACTITIONER
Payer: MEDICARE

## 2021-08-16 VITALS
BODY MASS INDEX: 28.42 KG/M2 | HEART RATE: 69 BPM | OXYGEN SATURATION: 98 % | TEMPERATURE: 97.4 F | RESPIRATION RATE: 16 BRPM | DIASTOLIC BLOOD PRESSURE: 68 MMHG | SYSTOLIC BLOOD PRESSURE: 122 MMHG | WEIGHT: 209.8 LBS | HEIGHT: 72 IN

## 2021-08-16 DIAGNOSIS — S79.912A HIP INJURY, LEFT, INITIAL ENCOUNTER: Primary | ICD-10-CM

## 2021-08-16 DIAGNOSIS — S79.912A HIP INJURY, LEFT, INITIAL ENCOUNTER: ICD-10-CM

## 2021-08-16 PROCEDURE — 99213 OFFICE O/P EST LOW 20 MIN: CPT | Performed by: NURSE PRACTITIONER

## 2021-08-16 PROCEDURE — 73502 X-RAY EXAM HIP UNI 2-3 VIEWS: CPT | Mod: TC,FY

## 2021-08-16 PROCEDURE — G0463 HOSPITAL OUTPT CLINIC VISIT: HCPCS

## 2021-08-16 ASSESSMENT — MIFFLIN-ST. JEOR: SCORE: 1744.65

## 2021-08-16 ASSESSMENT — PAIN SCALES - GENERAL: PAINLEVEL: SEVERE PAIN (6)

## 2021-08-16 NOTE — NURSING NOTE
No chief complaint on file.      Initial BP (!) 146/78 (BP Location: Left arm, Patient Position: Sitting, Cuff Size: Adult Regular)   Pulse 69   Temp 97.4  F (36.3  C) (Tympanic)   Resp 16   Ht 1.829 m (6')   Wt 95.2 kg (209 lb 12.8 oz)   SpO2 98%   BMI 28.45 kg/m   Estimated body mass index is 28.45 kg/m  as calculated from the following:    Height as of this encounter: 1.829 m (6').    Weight as of this encounter: 95.2 kg (209 lb 12.8 oz).  Medication Reconciliation: complete  Suzanna Nielson LPN

## 2021-08-16 NOTE — PROGRESS NOTES
Assessment & Plan   1. Hip injury, left, initial encounter  No acute fracture seen on x-ray. Discussed rest, ice/heat and anti-inflammatory medication. Follow up if hip pain fails to improve or increases in severity.  - XR Hip Left 2-3 Views (Clinic Performed); Future         BMI:   Estimated body mass index is 28.45 kg/m  as calculated from the following:    Height as of this encounter: 1.829 m (6').    Weight as of this encounter: 95.2 kg (209 lb 12.8 oz).     Follow up as needed.    JAROD Osorio    Patient is seen in conjunction with PA student.  History is reviewed with patient and pertinent portions of the exam are repeated.  Assessment and plan is reviewed with the patient.      Izzy Wagner NP  [facility] - [place] Fern[place] is a 71 year old who presents for the following health issues    HPI     Musculoskeletal problem/pain  Onset/Duration: 8/11/21  Description  Location: hp - left  Joint Swelling: YES  Redness: no  Pain: YES  Warmth: no  Intensity:  moderate  Progression of Symptoms:  Improving. Denies bruising, edema. Likes to walk 5 miles daily.  Accompanying signs and symptoms:   Fevers: no  Numbness/tingling/weakness: no  History  Trauma to the area: YES  Recent illness:  no  Previous similar problem: no  Previous evaluation:  no  Precipitating or alleviating factors:  Aggravating factors include: climbing stairs  Therapies tried and outcome: acetaminophen    Concern - Blisters both feet  Onset: 8/11/21  Description: open blister on pinky toes from wearing ill fitting shoes.  Intensity: mild  Progression of Symptoms: improving  Accompanying Signs & Symptoms: none  Previous history of similar problem: no  Precipitating factors:        Worsened by: none  Alleviating factors:        Improved by: none  Therapies tried and outcome: ointment    Patient Active Problem List   Diagnosis     Hyperlipidemia LDL goal <100     Generalized anxiety disorder      Gastroesophageal reflux disease with esophagitis     Benign prostatic hyperplasia with urinary obstruction     Acute pancreatitis     Agoraphobia with panic disorder     Compression deformity of vertebra     Pericardial effusion     Personality disorder (H)     Unilateral inguinal hernia     Umbilical hernia     Past Surgical History:   Procedure Laterality Date     ABDOMEN SURGERY      hernia surgerys 3-4     CHOLECYSTECTOMY  2016    galbladder      COLONOSCOPY  12/06/2018    [facility], negative for polyps 10 year      ENT SURGERY      tonsilectomy when little        Social History     Tobacco Use     Smoking status: Former Smoker     Packs/day: 1.00     Years: 20.00     Pack years: 20.00     Start date: 11/27/1971     Smokeless tobacco: Former User     Types: Chew   Substance Use Topics     Alcohol use: No     Family History   Problem Relation Age of Onset     Coronary Artery Disease Father      Other Cancer Brother          Current Outpatient Medications   Medication Sig Dispense Refill     aspirin 81 MG EC tablet Take 81 mg by mouth       famotidine (PEPCID) 20 MG tablet TAKE 1 TABLET (20 MG) BY MOUTH 2 TIMES DAILY 90 tablet 1     Multiple Vitamin (MULTI-VITAMINS) TABS        sildenafil (VIAGRA) 100 MG tablet TAKE 1 TABLET (100 MG) BY MOUTH DAILY AS NEEDED (TAKE 30 MIN TO 4 HOURS AS NEEDED PRIOR TO INTERCOURSE.) 12 tablet 5     simvastatin (ZOCOR) 40 MG tablet Take 1 tablet (40 mg) by mouth At Bedtime 90 tablet 1     No Known Allergies  Recent Labs   Lab Test 04/29/21  1534 08/25/20  1634 02/24/20  0938 08/21/19  0952   LDL 86 87 79 127*   HDL 66 53 52 59   TRIG 48 52 79 53   ALT 18  --  20 16   CR 0.82 0.76 0.98 0.90   GFRESTIMATED 89 >90 78 86   GFRESTBLACK >90 >90 >90 >90   POTASSIUM 4.1 3.9 4.3 4.4   TSH 1.53 1.57 2.15 1.52      BP Readings from Last 3 Encounters:   08/16/21 122/68   04/29/21 118/68   09/18/20 130/60    Wt Readings from Last 3 Encounters:   08/16/21 95.2 kg (209 lb 12.8 oz)   04/29/21 91.8  kg (202 lb 4.8 oz)   09/18/20 87.1 kg (192 lb)                      Review of Systems   Constitutional, HEENT, cardiovascular, pulmonary, gi and gu systems are negative, except as otherwise noted.      Objective    BP (!) 146/78 (BP Location: Left arm, Patient Position: Sitting, Cuff Size: Adult Regular)   Pulse 69   Temp 97.4  F (36.3  C) (Tympanic)   Resp 16   Ht 1.829 m (6')   Wt 95.2 kg (209 lb 12.8 oz)   SpO2 98%   BMI 28.45 kg/m    Body mass index is 28.45 kg/m .     Physical Exam   GENERAL: Jim appears healthy, alert and in no acute distress  RESP: lungs clear to auscultation, no increased work in breathing. Regular rate, rhythm, depth and ease - no rales, rhonchi or wheezes   CV: regular rate and rhythm, clear S1 S2, no extra heart sounds. No murmurs, rubs or gallops. No peripheral edema and peripheral pulses strong   MS: no gross musculoskeletal defects noted, no edema  SKIN: Appears well perfused and dry to the touch. No suspicious lesions or rashes. Healing blisters on lateral aspect of 5th toe bilateral. No surrounding erythema, warmth, fluctuance or drainage.   NEURO: Normal strength and tone, mentation intact and speech normal  PSYCH: mentation appears normal, affect normal/bright          Results for orders placed or performed in visit on 08/16/21   XR Hip Left 2-3 Views (Clinic Performed)     Status: None    Narrative    PROCEDURE:  XR HIP LEFT 2-3 VIEWS    HISTORY: Hip injury, left, initial encounter    COMPARISON:  None.    TECHNIQUE:  2 views of the left hip were obtained.    FINDINGS:  Articular space is normal height of the left hip. The  acetabulum and proximal femur appears normal.       Impression    IMPRESSION: No acute fracture.      DAVID PERSON MD         SYSTEM ID:  [id number]

## 2021-09-14 DIAGNOSIS — K21.00 GASTROESOPHAGEAL REFLUX DISEASE WITH ESOPHAGITIS WITHOUT HEMORRHAGE: ICD-10-CM

## 2021-09-14 RX ORDER — FAMOTIDINE 20 MG/1
20 TABLET, FILM COATED ORAL 2 TIMES DAILY
Qty: 90 TABLET | Refills: 1 | Status: SHIPPED | OUTPATIENT
Start: 2021-09-14 | End: 2021-11-03

## 2021-09-22 ENCOUNTER — NURSE TRIAGE (OUTPATIENT)
Dept: FAMILY MEDICINE | Facility: OTHER | Age: 71
End: 2021-09-22

## 2021-09-22 ENCOUNTER — MYC MEDICAL ADVICE (OUTPATIENT)
Dept: FAMILY MEDICINE | Facility: OTHER | Age: 71
End: 2021-09-22

## 2021-09-22 NOTE — TELEPHONE ENCOUNTER
Pt and pt's wife on the phone, they called triage regarding concerns of HR 51 after strenuous activity. At this time HR 60's no sx no concerns.     9/22/2021  11:26 AM  /82  HR 58    Next 5 appointments (look out 90 days)    Sep 23, 2021 10:15 AM  (Arrive by 10:00 AM)  SHORT with Izzy Wagner NP  [facility] ([facility] ) [address]  [place] [address]  [phone]   Oct 29, 2021  8:30 AM  (Arrive by 8:15 AM)  SHORT with Izzy Wagner NP  [facility] ([facility] ) [address]  [place] [address]  [phone]        Advised to call back directly if there are further questions, or if these symptoms fail to improve as anticipated or worsen.  Go to ED call 911 if needing immediate medial attention.         Reason for Disposition    Problems with anxiety or stress    Additional Information    Negative: Passed out (i.e., lost consciousness, collapsed and was not responding)    Negative: Shock suspected (e.g., cold/pale/clammy skin, too weak to stand, low BP, rapid pulse)    Negative: Difficult to awaken or acting confused (e.g., disoriented, slurred speech)    Negative: Visible sweat on face or sweat dripping down face    Negative: Unable to walk, or can only walk with assistance (e.g., requires support)    Negative: [1] Received SHOCK from implantable cardiac defibrillator AND [2] persisting symptoms (i.e., palpitations, lightheadedness)    Negative: [1] Dizziness, lightheadedness, or weakness AND [2] heart beating very rapidly (e.g., > 140 / minute)    Negative: [1] Dizziness, lightheadedness, or weakness AND [2] heart beating very slowly  (e.g., < 50 / minute)    Negative: Sounds like a life-threatening emergency to the triager    Negative: Chest pain    Negative: Difficulty breathing    Negative: Dizziness, lightheadedness, or weakness    Negative: [1] Heart  "beating very rapidly (e.g., > 140 / minute) AND [2] present now  (Exception: during exercise)    Negative: Heart beating very slowly (e.g., < 50 / minute)  (Exception: athlete)    Negative: New or worsened shortness of breath with activity (dyspnea on exertion)    Negative: Patient sounds very sick or weak to the triager    Negative: Age > 60 years (Exception: brief heart beat symptoms that went away and now feels well)    Negative: Taking water pill (i.e., diuretic) or heart medication (e.g., digoxin)    Negative: [1] Heart beating very rapidly (e.g., > 140 / minute) AND [2] not present now  (Exception: during exercise)    Negative: [1] Skipped or extra beat(s) AND [2] increases with exercise or exertion    Negative: [1] Skipped or extra beat(s) AND [2] occurs 4 or more times per minute    Negative: New or worsened ankle swelling    Negative: History of heart disease  (i.e., heart attack, bypass surgery, angina, angioplasty, CHF) (Exception: brief heart beat symptoms that went away and now feels well)    Negative: Wearing a \"Holter monitor\" or \"cardiac event monitor\"    Negative: [1] Received SHOCK from implantable cardiac defibrillator AND [2] now feels well    Negative: History of hyperthyroidism or taking thyroid medication    Negative: Known or suspected substance abuse (e.g., cocaine, alcohol abuse)    Negative: [1] ADHD AND [2] taking stimulant medication    Negative: [1] Palpitations AND [2] no improvement after using CARE ADVICE    Answer Assessment - Initial Assessment Questions  1. DESCRIPTION: \"Please describe your heart rate or heart beat that you are having\" (e.g., fast/slow, regular/irregular, skipped or extra beats, \"palpitations\")      \"I can tell when my heart slows down, little short of breath\" \"after a long walk my heart rate drops to as low as 51\". Pt reports he has a fitbit. HR 60's.   2. ONSET: \"When did it start?\" (Minutes, hours or days)       today  3. DURATION: \"How long does it last\" " "(e.g., seconds, minutes, hours)      Occasional 2 hours  4. PATTERN \"Does it come and go, or has it been constant since it started?\"  \"Does it get worse with exertion?\"   \"Are you feeling it now?\"      intermittent  5. TAP: \"Using your hand, can you tap out what you are feeling on a chair or table in front of you, so that I can hear?\" (Note: not all patients can do this)          6. HEART RATE: \"Can you tell me your heart rate?\" \"How many beats in 15 seconds?\"  (Note: not all patients can do this)        Current HR now at 1121 56, denies any sx or concerns  7. RECURRENT SYMPTOM: \"Have you ever had this before?\" If so, ask: \"When was the last time?\" and \"What happened that time?\"       yes  8. CAUSE: \"What do you think is causing the palpitations?\"      Denies palpitation   9. CARDIAC HISTORY: \"Do you have any history of heart disease?\" (e.g., heart attack, angina, bypass surgery, angioplasty, arrhythmia)       Denies   10. OTHER SYMPTOMS: \"Do you have any other symptoms?\" (e.g., dizziness, chest pain, sweating, difficulty breathing)        Denies all  11. PREGNANCY: \"Is there any chance you are pregnant?\" \"When was your last menstrual period?\"        no    Protocols used: HEART RATE AND HEARTBEAT [id number]      "

## 2021-09-23 ENCOUNTER — OFFICE VISIT (OUTPATIENT)
Dept: FAMILY MEDICINE | Facility: OTHER | Age: 71
End: 2021-09-23
Attending: NURSE PRACTITIONER
Payer: MEDICARE

## 2021-09-23 VITALS
BODY MASS INDEX: 28.48 KG/M2 | WEIGHT: 210 LBS | OXYGEN SATURATION: 98 % | TEMPERATURE: 96.7 F | SYSTOLIC BLOOD PRESSURE: 112 MMHG | RESPIRATION RATE: 15 BRPM | DIASTOLIC BLOOD PRESSURE: 60 MMHG | HEART RATE: 62 BPM

## 2021-09-23 DIAGNOSIS — R00.1 BRADYCARDIA: Primary | ICD-10-CM

## 2021-09-23 DIAGNOSIS — E78.5 HYPERLIPIDEMIA LDL GOAL <100: ICD-10-CM

## 2021-09-23 LAB
BASOPHILS # BLD AUTO: 0 10E3/UL (ref 0–0.2)
BASOPHILS NFR BLD AUTO: 1 %
EOSINOPHIL # BLD AUTO: 0 10E3/UL (ref 0–0.7)
EOSINOPHIL NFR BLD AUTO: 0 %
ERYTHROCYTE [DISTWIDTH] IN BLOOD BY AUTOMATED COUNT: 12.9 % (ref 10–15)
HCT VFR BLD AUTO: 40.2 % (ref 40–53)
HGB BLD-MCNC: 13.6 G/DL (ref 13.3–17.7)
HOLD SPECIMEN: NORMAL
LYMPHOCYTES # BLD AUTO: 1.4 10E3/UL (ref 0.8–5.3)
LYMPHOCYTES NFR BLD AUTO: 29 %
MCH RBC QN AUTO: 32.5 PG (ref 26.5–33)
MCHC RBC AUTO-ENTMCNC: 33.8 G/DL (ref 31.5–36.5)
MCV RBC AUTO: 96 FL (ref 78–100)
MONOCYTES # BLD AUTO: 0.5 10E3/UL (ref 0–1.3)
MONOCYTES NFR BLD AUTO: 10 %
NEUTROPHILS # BLD AUTO: 3.1 10E3/UL (ref 1.6–8.3)
NEUTROPHILS NFR BLD AUTO: 61 %
PLATELET # BLD AUTO: 229 10E3/UL (ref 150–450)
RBC # BLD AUTO: 4.18 10E6/UL (ref 4.4–5.9)
WBC # BLD AUTO: 5.1 10E3/UL (ref 4–11)

## 2021-09-23 PROCEDURE — 93010 ELECTROCARDIOGRAM REPORT: CPT | Performed by: INTERNAL MEDICINE

## 2021-09-23 PROCEDURE — 99213 OFFICE O/P EST LOW 20 MIN: CPT | Performed by: NURSE PRACTITIONER

## 2021-09-23 PROCEDURE — 85025 COMPLETE CBC W/AUTO DIFF WBC: CPT | Mod: ZL | Performed by: NURSE PRACTITIONER

## 2021-09-23 PROCEDURE — 36415 COLL VENOUS BLD VENIPUNCTURE: CPT | Mod: ZL | Performed by: NURSE PRACTITIONER

## 2021-09-23 PROCEDURE — G0463 HOSPITAL OUTPT CLINIC VISIT: HCPCS

## 2021-09-23 PROCEDURE — 93005 ELECTROCARDIOGRAM TRACING: CPT

## 2021-09-23 RX ORDER — SIMVASTATIN 40 MG
40 TABLET ORAL AT BEDTIME
Qty: 90 TABLET | Refills: 1 | Status: SHIPPED | OUTPATIENT
Start: 2021-09-23 | End: 2022-04-12

## 2021-09-23 ASSESSMENT — PAIN SCALES - GENERAL: PAINLEVEL: NO PAIN (0)

## 2021-09-23 NOTE — NURSING NOTE
No chief complaint on file.      Initial /60 (BP Location: Left arm, Patient Position: Sitting, Cuff Size: Adult Regular)   Pulse 62   Temp (!) 96.7  F (35.9  C) (Tympanic)   Resp 15   Wt 95.3 kg (210 lb)   SpO2 98%   BMI 28.48 kg/m   Estimated body mass index is 28.48 kg/m  as calculated from the following:    Height as of 8/16/21: 1.829 m (6').    Weight as of this encounter: 95.3 kg (210 lb).  Medication Reconciliation: complete  Mylene Brown LPN

## 2021-09-23 NOTE — PROGRESS NOTES
Assessment & Plan     1. Hyperlipidemia LDL goal <100  Continue current plan   - simvastatin (ZOCOR) 40 MG tablet; Take 1 tablet (40 mg) by mouth At Bedtime  Dispense: 90 tablet; Refill: 1    2. Bradycardia  HGB normal, bradycardia on EKG otherwise normal.   - EKG 12-lead complete w/read - (Clinic Performed)  - CBC with platelets and differential  - Leadless EKG Monitor 8 to 14 Days; Future - Zio patch      Review of the result(s) of each unique test - EKG, CBC    Follow-up as needed    Izzy Wagner NP  [facility] - [place] is a 71 year old who presents for the following health issues     HPI     Hypertension Follow-up    Do you check your blood pressure regularly outside of the clinic? No     Are you following a low salt diet? Yes    Are your blood pressures ever more than 140 on the top number (systolic) OR more   than 90 on the bottom number (diastolic), for example 140/90? No      Low heart rate  Has noticed it go into the 40's. He can feel it, sometimes gets short of breath, some dizziness will then look at fit bit and pulse is in the 40's.      He remains quite active by cutting TriplePulse, walks 10,000-12,000 steps prior to breakfast.  Denies chest pain or shortness of breath with activity.      Patient Active Problem List   Diagnosis     Hyperlipidemia LDL goal <100     Generalized anxiety disorder     Gastroesophageal reflux disease with esophagitis     Benign prostatic hyperplasia with urinary obstruction     Acute pancreatitis     Agoraphobia with panic disorder     Compression deformity of vertebra     Pericardial effusion     Personality disorder (H)     Unilateral inguinal hernia     Umbilical hernia     Past Surgical History:   Procedure Laterality Date     ABDOMEN SURGERY      hernia surgerys 3-4     CHOLECYSTECTOMY  2016    galbladder      COLONOSCOPY  12/06/2018    [facility], negative for polyps 10 year      ENT SURGERY      tonsilectomy when little         Social History     Tobacco Use     Smoking status: Former Smoker     Packs/day: 1.00     Years: 20.00     Pack years: 20.00     Start date: 11/27/1971     Smokeless tobacco: Former User     Types: Chew   Substance Use Topics     Alcohol use: No     Family History   Problem Relation Age of Onset     Coronary Artery Disease Father      Other Cancer Brother          Current Outpatient Medications   Medication Sig Dispense Refill     aspirin 81 MG EC tablet Take 81 mg by mouth       famotidine (PEPCID) 20 MG tablet TAKE 1 TABLET (20 MG) BY MOUTH 2 TIMES DAILY 90 tablet 1     Multiple Vitamin (MULTI-VITAMINS) TABS        sildenafil (VIAGRA) 100 MG tablet TAKE 1 TABLET (100 MG) BY MOUTH DAILY AS NEEDED (TAKE 30 MIN TO 4 HOURS AS NEEDED PRIOR TO INTERCOURSE.) 12 tablet 5     simvastatin (ZOCOR) 40 MG tablet Take 1 tablet (40 mg) by mouth At Bedtime 90 tablet 1     No Known Allergies  Recent Labs   Lab Test 04/29/21  1534 08/25/20  1634 02/24/20  0938 02/24/20  0938 08/21/19  0952 08/21/19  0952   LDL 86 87  --  79   < > 127*   HDL 66 53  --  52   < > 59   TRIG 48 52  --  79   < > 53   ALT 18  --   --  20  --  16   CR 0.82 0.76   < > 0.98   < > 0.90   GFRESTIMATED 89 >90   < > 78   < > 86   GFRESTBLACK >90 >90   < > >90   < > >90   POTASSIUM 4.1 3.9   < > 4.3   < > 4.4   TSH 1.53 1.57   < > 2.15   < > 1.52    < > = values in this interval not displayed.      BP Readings from Last 3 Encounters:   09/23/21 112/60   08/16/21 122/68   04/29/21 118/68    Wt Readings from Last 3 Encounters:   09/23/21 95.3 kg (210 lb)   08/16/21 95.2 kg (209 lb 12.8 oz)   04/29/21 91.8 kg (202 lb 4.8 oz)                Review of Systems   Constitutional, HEENT, cardiovascular, pulmonary, gi and gu systems are negative, except as otherwise noted.      Objective    /60 (BP Location: Left arm, Patient Position: Sitting, Cuff Size: Adult Regular)   Pulse 62   Temp (!) 96.7  F (35.9  C) (Tympanic)   Resp 15   Wt 95.3 kg (210 lb)    SpO2 98%   BMI 28.48 kg/m    Body mass index is 28.48 kg/m .  Physical Exam   GENERAL: healthy, alert and no distress  NECK: no adenopathy, no asymmetry, masses, or scars, thyroid normal to palpation and no carotid bruits  RESP: lungs clear to auscultation - no rales, rhonchi or wheezes  CV: regular rate and rhythm, normal S1 S2, no S3 or S4, no murmur, click or rub, no peripheral edema and peripheral pulses strong  MS: no gross musculoskeletal defects noted, no edema  PSYCH: mentation appears normal, affect normal/bright    Results for orders placed or performed in visit on 09/23/21   CBC with platelets and differential     Status: Abnormal   Result Value Ref Range    WBC Count 5.1 4.0 - 11.0 10e3/uL    RBC Count 4.18 (L) 4.40 - 5.90 10e6/uL    Hemoglobin 13.6 13.3 - 17.7 g/dL    Hematocrit 40.2 40.0 - 53.0 %    MCV 96 78 - 100 fL    MCH 32.5 26.5 - 33.0 pg    MCHC 33.8 31.5 - 36.5 g/dL    RDW 12.9 10.0 - 15.0 %    Platelet Count 229 150 - 450 10e3/uL    % Neutrophils 61 %    % Lymphocytes 29 %    % Monocytes 10 %    % Eosinophils 0 %    % Basophils 1 %    Absolute Neutrophils 3.1 1.6 - 8.3 10e3/uL    Absolute Lymphocytes 1.4 0.8 - 5.3 10e3/uL    Absolute Monocytes 0.5 0.0 - 1.3 10e3/uL    Absolute Eosinophils 0.0 0.0 - 0.7 10e3/uL    Absolute Basophils 0.0 0.0 - 0.2 10e3/uL   Extra Serum Separator Tube (SST)     Status: None   Result Value Ref Range    Hold Specimen [facility]    CBC with platelets and differential     Status: Abnormal    Narrative    The following orders were created for panel order CBC with platelets and differential.  Procedure                               Abnormality         Status                     ---------                               -----------         ------                     CBC with platelets and d...[[id number]]  Abnormal            Final result                 Please view results for these tests on the individual orders.   Extra Tube     Status: None    Narrative    The following  orders were created for panel order Extra Tube.  Procedure                               Abnormality         Status                     ---------                               -----------         ------                     Extra Serum Separator Tu...[[id number]]                      Final result                 Please view results for these tests on the individual orders.          EKG Interpretation:      Interpreted by Izzy Wagner NP    Symptoms at time of EKG: None   Rhythm: Normal sinus   Rate: Normal and Bradycardia - 55  Axis: Normal  Ectopy: None  Conduction: Normal  ST Segments/ T Waves: No ST-T wave changes and No acute ischemic changes  Q Waves: None  Comparison to prior: pulse was 62 in 2018 otherwise unchanged.    Clinical Impression: no acute changes

## 2021-09-23 NOTE — PATIENT INSTRUCTIONS
Assessment & Plan     1. Hyperlipidemia LDL goal <100  Continue current plan   - simvastatin (ZOCOR) 40 MG tablet; Take 1 tablet (40 mg) by mouth At Bedtime  Dispense: 90 tablet; Refill: 1    2. Bradycardia  HGB normal, bradycardia on EKG otherwise normal.   - EKG 12-lead complete w/read - (Clinic Performed)  - CBC with platelets and differential  - Leadless EKG Monitor 8 to 14 Days; Future      Review of the result(s) of each unique test - EKG, CBC    Follow-up as needed    Izzy Wagner, NP  [facility]

## 2021-09-24 ENCOUNTER — HOSPITAL ENCOUNTER (OUTPATIENT)
Dept: CARDIOLOGY | Facility: HOSPITAL | Age: 71
Discharge: HOME | End: 2021-09-24
Attending: NURSE PRACTITIONER | Admitting: INTERNAL MEDICINE
Payer: MEDICARE

## 2021-09-24 ENCOUNTER — MYC MEDICAL ADVICE (OUTPATIENT)
Dept: FAMILY MEDICINE | Facility: OTHER | Age: 71
End: 2021-09-24

## 2021-09-24 DIAGNOSIS — R00.1 BRADYCARDIA: ICD-10-CM

## 2021-09-24 PROCEDURE — 93248 EXT ECG>7D<15D REV&INTERPJ: CPT | Performed by: INTERNAL MEDICINE

## 2021-09-24 PROCEDURE — 93246 EXT ECG>7D<15D RECORDING: CPT

## 2021-10-10 ENCOUNTER — HEALTH MAINTENANCE LETTER (OUTPATIENT)
Age: 71
End: 2021-10-10

## 2021-10-12 DIAGNOSIS — E78.5 HYPERLIPIDEMIA LDL GOAL <100: ICD-10-CM

## 2021-10-12 RX ORDER — SIMVASTATIN 40 MG
40 TABLET ORAL AT BEDTIME
Qty: 90 TABLET | Refills: 1 | OUTPATIENT
Start: 2021-10-12

## 2021-10-14 ENCOUNTER — MYC MEDICAL ADVICE (OUTPATIENT)
Dept: FAMILY MEDICINE | Facility: OTHER | Age: 71
End: 2021-10-14

## 2021-10-20 ENCOUNTER — TRANSFERRED RECORDS (OUTPATIENT)
Dept: HEALTH INFORMATION MANAGEMENT | Facility: CLINIC | Age: 71
End: 2021-10-20

## 2021-10-20 ENCOUNTER — NURSE TRIAGE (OUTPATIENT)
Dept: FAMILY MEDICINE | Facility: OTHER | Age: 71
End: 2021-10-20

## 2021-10-20 NOTE — TELEPHONE ENCOUNTER
Emergency Department and Urgent Care Follow-up      Reason for ER/UC visit: contusion right middle finger, wood splitter   o Date seen: 10/20/21 ED [place]       New or Worsening symptoms:    no      Prescription Received/Picked up from Pharmacy?: yes  o Medications started? yes  o Any questions or issues regarding your prescription?: no      Follow-up Results or Labs that are pending: no      Questions or concerns?: no      ER Recommends Follow-up by: two to three days per pt's report       RN Recommendations: agrees   o Appointment scheduled: yes  Appointments in Next Year    Oct 22, 2021  9:30 AM  (Arrive by 9:15 AM)  SHORT with Izzy Wagner NP  [facility] ([facility] ) [phone]   Oct 29, 2021  8:30 AM  (Arrive by 8:15 AM)  SHORT with Izzy Wagner NP  [facility] ([facility] ) [phone]      o   o     If you start feeling worse, or have any further questions, please feel free to contact Nurse Triage at [phone].  If needing immediate medical attention at any time please call 911/Go to the ER.

## 2021-10-22 ENCOUNTER — OFFICE VISIT (OUTPATIENT)
Dept: FAMILY MEDICINE | Facility: OTHER | Age: 71
End: 2021-10-22
Attending: NURSE PRACTITIONER
Payer: COMMERCIAL

## 2021-10-22 VITALS
HEIGHT: 72 IN | DIASTOLIC BLOOD PRESSURE: 62 MMHG | WEIGHT: 213 LBS | TEMPERATURE: 95.9 F | OXYGEN SATURATION: 98 % | BODY MASS INDEX: 28.85 KG/M2 | SYSTOLIC BLOOD PRESSURE: 122 MMHG | HEART RATE: 59 BPM

## 2021-10-22 DIAGNOSIS — S69.91XD INJURY OF FINGER OF RIGHT HAND, SUBSEQUENT ENCOUNTER: Primary | ICD-10-CM

## 2021-10-22 DIAGNOSIS — S61.312D LACERATION OF RIGHT MIDDLE FINGER WITHOUT FOREIGN BODY WITH DAMAGE TO NAIL, SUBSEQUENT ENCOUNTER: ICD-10-CM

## 2021-10-22 DIAGNOSIS — S62.632K: ICD-10-CM

## 2021-10-22 PROCEDURE — 99214 OFFICE O/P EST MOD 30 MIN: CPT | Performed by: NURSE PRACTITIONER

## 2021-10-22 PROCEDURE — G0463 HOSPITAL OUTPT CLINIC VISIT: HCPCS

## 2021-10-22 RX ORDER — HYDROCODONE BITARTRATE AND ACETAMINOPHEN 5; 325 MG/1; MG/1
1 TABLET ORAL EVERY 4 HOURS PRN
Qty: 20 TABLET | Refills: 0 | Status: SHIPPED | OUTPATIENT
Start: 2021-10-22 | End: 2021-11-03

## 2021-10-22 RX ORDER — HYDROCODONE BITARTRATE AND ACETAMINOPHEN 5; 325 MG/1; MG/1
1 TABLET ORAL
COMMUNITY
Start: 2021-10-20 | End: 2021-10-22

## 2021-10-22 RX ORDER — CEPHALEXIN 500 MG/1
500 CAPSULE ORAL
COMMUNITY
Start: 2021-10-20 | End: 2021-10-27

## 2021-10-22 ASSESSMENT — MIFFLIN-ST. JEOR: SCORE: 1759.16

## 2021-10-22 ASSESSMENT — PAIN SCALES - GENERAL: PAINLEVEL: MILD PAIN (3)

## 2021-10-22 NOTE — PROGRESS NOTES
Assessment & Plan     1. Injury of finger of right hand, subsequent encounter  Referral to Dr Klein for evaluation.   - Orthopedic  Referral; Future  - HYDROcodone-acetaminophen (NORCO) 5-325 MG tablet; Take 1 tablet by mouth every 4 hours as needed for severe pain  Dispense: 20 tablet; Refill: 0    2. Laceration of right middle finger without foreign body with damage to nail, subsequent encounter  - Orthopedic  Referral; Future  - HYDROcodone-acetaminophen (NORCO) 5-325 MG tablet; Take 1 tablet by mouth every 4 hours as needed for severe pain  Dispense: 20 tablet; Refill: 0    3. 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      Tetanus current    Follow-up as needed    Izzy Wagner, NP  [facility] - JUANCHO Fernandez is a 71 year old who presents for the following health issues     HPI     ED/UC Followup:    Facility:  [facility]  Date of visit: 10/20/21  Reason for visit: Contusion of right middle finger with damage to finger nail, laceration   Current Status: pt states finger is sore, has not changed dressing yet     Discharge Instructions:    Change dressings as needed if soaks through    Start Antibiotics - keflex    Keep elevated all the time the first few days    Use Tylenol or Ibuprofen for pain    May use Norco for pain control over the next 3-4 days    Have finger rechecked in 4-5 days    You may need to have follow up with Plastic surgery            The patient is a 71-year-old male who caught his right index finger in a log splitter. Patient had this smashed between the metal. Patient notes that the injury is primarily only the middle digit. Patient denies other  injuries. Patient is here for further care.    XR HAND RIGHT 3 OR MORE VIEWS    HISTORY: Rule out fracture.    COMPARISON: None.    FINDINGS: Fracture of the tuft of the 3rd distal phalanx with a large amount of soft tissue swelling about the distal phalanx. Displaced comminuted fracture fragments. Osteopenia. Mild degenerative changes at multiple IP joints and the 1st metacarpophalangeal joint.    IMPRESSION: Fracture of the tuft of the distal phalanx 3rd finger.    ED notes:  Patient was seen and evaluated. Had no other injuries, except for the long finger distal phalanx contusion and distal tuft fracture with complex laceration. Laceration was repaired as noted; see her laceration and procedure note. Patient will be started on antibiotics. Patient also will have this wound reevaluated in 3 or 4 days. Patient may have to have this observed over the next few weeks. Patient may need grafting or other secondary repair. Patient was started on Keflex. Patient also was given a prescription for Norco for pain. Patient will follow up or return as needed.    D: 10/21/2021 18:25:06/BETH TID #: [id number]  T: 10/21/2021 18:43:54/zion    Assessment:   Contusion of right middle finger with damage to nail, initial encounter (primary encounter diagnosis)  Laceration of right middle finger without foreign body with damage to nail, initial encounter     Dictated By: Cornell Ellis 10/20/2021 1:03 PM  Edited By: PM 10/20/2021 1:07 PM         Review of Systems   Constitutional, HEENT, cardiovascular, pulmonary, GI, , musculoskeletal, neuro, skin, endocrine and psych systems are negative, except as otherwise noted.      Objective    /62 (BP Location: Right arm, Patient Position: Chair, Cuff Size: Adult Regular)   Pulse 59   Temp (!) 95.9  F (35.5  C) (Tympanic)   Ht 1.829 m (6')   Wt 96.6 kg (213 lb)   SpO2 98%   BMI 28.89 kg/m    Body mass index is 28.89 kg/m .  Physical Exam   GENERAL: healthy, alert and no distress  SKIN:  right middle finger is bloody, several sutures, macerated.  Sensation is decreased

## 2021-11-01 NOTE — PROGRESS NOTES
Assessment & Plan     1. Hyperlipidemia LDL goal <100  Continue zocor  - Lipid Profile; Future  - Comprehensive metabolic panel; Future  - TSH with free T4 reflex; Future  - CBC with platelets; Future    2. Gastroesophageal reflux disease with esophagitis without hemorrhage  Use as needed   - famotidine (PEPCID) 20 MG tablet; Take 1 tablet (20 mg) by mouth 2 times daily as needed (heartburn)  Dispense: 90 tablet; Refill: 1    3. On statin therapy  - Comprehensive metabolic panel; Future    4. Injury of finger of right hand, subsequent encounter  Continue following with Dr Klein  - HYDROcodone-acetaminophen (NORCO) 5-325 MG tablet; Take 1 tablet by mouth every 4 hours as needed for severe pain  Dispense: 20 tablet; Refill: 0    5. Laceration of right middle finger without foreign body with damage to nail, subsequent encounter  - HYDROcodone-acetaminophen (NORCO) 5-325 MG tablet; Take 1 tablet by mouth every 4 hours as needed for severe pain  Dispense: 20 tablet; Refill: 0    6. 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      Return in about 6 months (around 5/3/2022) for lipids, GERD.    Izzy Wagner, NP  [facility] - JUAN[place] Fernandez is a 71 year old who presents for the following health issues     HPI     Hyperlipidemia Follow-Up      Are you regularly taking any medication or supplement to lower your cholesterol?   Yes- simvastatin    Are you having muscle aches or other side effects that you think could be caused by your cholesterol lowering medication?  No        How many servings of fruits and vegetables do you eat daily?  2-3    On average, how many  sweetened beverages do you drink each day (Examples: soda, juice, sweet tea, etc.  Do NOT count diet or artificially sweetened beverages)?   0    How many days per week do you exercise enough to make your heart beat faster? 3 or less    How many minutes a day do you exercise enough to make your heart beat faster? 9 or less    How many days per week do you miss taking your medication? 0    Finger laceration - he did see Dr Klein who advised dressing changes and wait 2 weeks for further evaluation.   He does have an appt scheduled.  Due for dressing change today. Requesting a refill of norco of which he takes at bedtime, only very occasionally during daytime hours.     Patient Active Problem List   Diagnosis     Hyperlipidemia LDL goal <100     Generalized anxiety disorder     Gastroesophageal reflux disease with esophagitis     Benign prostatic hyperplasia with urinary obstruction     Acute pancreatitis     Agoraphobia with panic disorder     Compression deformity of vertebra     Pericardial effusion     Personality disorder (H)     Unilateral inguinal hernia     Umbilical hernia     Past Surgical History:   Procedure Laterality Date     ABDOMEN SURGERY      hernia surgerys 3-4     CHOLECYSTECTOMY  2016    galbladder      COLONOSCOPY  12/06/2018    [place], negative for polyps 10 year      ENT SURGERY      tonsilectomy when little        Social History     Tobacco Use     Smoking status: Former Smoker     Packs/day: 1.00     Years: 20.00     Pack years: 20.00     Start date: 11/27/1971     Smokeless tobacco: Former User     Types: Chew   Substance Use Topics     Alcohol use: No     Family History   Problem Relation Age of Onset     Coronary Artery Disease Father      Other Cancer Brother          Current Outpatient Medications   Medication Sig Dispense Refill     aspirin 81 MG EC tablet Take 81 mg by mouth       famotidine (PEPCID) 20 MG tablet Take 1 tablet (20 mg) by mouth 2 times daily as needed  (heartburn) 90 tablet 1     HYDROcodone-acetaminophen (NORCO) 5-325 MG tablet Take 1 tablet by mouth every 4 hours as needed for severe pain 20 tablet 0     Multiple Vitamin (MULTI-VITAMINS) TABS        sildenafil (VIAGRA) 100 MG tablet TAKE 1 TABLET (100 MG) BY MOUTH DAILY AS NEEDED (TAKE 30 MIN TO 4 HOURS AS NEEDED PRIOR TO INTERCOURSE.) 12 tablet 5     simvastatin (ZOCOR) 40 MG tablet Take 1 tablet (40 mg) by mouth At Bedtime 90 tablet 1     No Known Allergies  Recent Labs   Lab Test 04/29/21  1534 08/25/20  1634 02/24/20  0938 02/24/20  0938 08/21/19  0952 08/21/19  0952   LDL 86 87  --  79   < > 127*   HDL 66 53  --  52   < > 59   TRIG 48 52  --  79   < > 53   ALT 18  --   --  20  --  16   CR 0.82 0.76   < > 0.98   < > 0.90   GFRESTIMATED 89 >90   < > 78   < > 86   GFRESTBLACK >90 >90   < > >90   < > >90   POTASSIUM 4.1 3.9   < > 4.3   < > 4.4   TSH 1.53 1.57   < > 2.15   < > 1.52    < > = values in this interval not displayed.      BP Readings from Last 3 Encounters:   11/03/21 124/70   10/22/21 122/62   09/23/21 112/60    Wt Readings from Last 3 Encounters:   11/03/21 97.1 kg (214 lb)   10/22/21 96.6 kg (213 lb)   09/23/21 95.3 kg (210 lb)                      Review of Systems   Constitutional, HEENT, cardiovascular, pulmonary, gi and gu systems are negative, except as otherwise noted.      Objective    /70   Pulse 67   Temp 96.8  F (36  C) (Tympanic)   Ht 1.829 m (6')   Wt 97.1 kg (214 lb)   SpO2 98%   BMI 29.02 kg/m    Body mass index is 29.02 kg/m .  Physical Exam   GENERAL: healthy, alert and no distress  NECK: no adenopathy, no asymmetry, masses, or scars, thyroid normal to palpation and no carotid bruits  RESP: lungs clear to auscultation - no rales, rhonchi or wheezes  CV: regular rate and rhythm, normal S1 S2, no S3 or S4, no murmur, click or rub, no peripheral edema and peripheral pulses strong  MS: long finger right hand, dressing intact.    PSYCH: mentation appears normal, affect  normal/bright

## 2021-11-03 ENCOUNTER — OFFICE VISIT (OUTPATIENT)
Dept: FAMILY MEDICINE | Facility: OTHER | Age: 71
End: 2021-11-03
Attending: NURSE PRACTITIONER
Payer: COMMERCIAL

## 2021-11-03 VITALS
OXYGEN SATURATION: 98 % | HEART RATE: 67 BPM | DIASTOLIC BLOOD PRESSURE: 70 MMHG | HEIGHT: 72 IN | BODY MASS INDEX: 28.99 KG/M2 | SYSTOLIC BLOOD PRESSURE: 124 MMHG | WEIGHT: 214 LBS | TEMPERATURE: 96.8 F

## 2021-11-03 DIAGNOSIS — Z79.899 ON STATIN THERAPY: ICD-10-CM

## 2021-11-03 DIAGNOSIS — S61.312D LACERATION OF RIGHT MIDDLE FINGER WITHOUT FOREIGN BODY WITH DAMAGE TO NAIL, SUBSEQUENT ENCOUNTER: ICD-10-CM

## 2021-11-03 DIAGNOSIS — K21.00 GASTROESOPHAGEAL REFLUX DISEASE WITH ESOPHAGITIS WITHOUT HEMORRHAGE: ICD-10-CM

## 2021-11-03 DIAGNOSIS — S69.91XD INJURY OF FINGER OF RIGHT HAND, SUBSEQUENT ENCOUNTER: ICD-10-CM

## 2021-11-03 DIAGNOSIS — E78.5 HYPERLIPIDEMIA LDL GOAL <100: Primary | ICD-10-CM

## 2021-11-03 DIAGNOSIS — S62.632K: ICD-10-CM

## 2021-11-03 LAB
ALBUMIN SERPL-MCNC: 3.6 G/DL (ref 3.4–5)
ALP SERPL-CCNC: 56 U/L (ref 40–150)
ALT SERPL W P-5'-P-CCNC: 20 U/L (ref 0–70)
ANION GAP SERPL CALCULATED.3IONS-SCNC: 3 MMOL/L (ref 3–14)
AST SERPL W P-5'-P-CCNC: 15 U/L (ref 0–45)
BILIRUB SERPL-MCNC: 0.3 MG/DL (ref 0.2–1.3)
BUN SERPL-MCNC: 14 MG/DL (ref 7–30)
CALCIUM SERPL-MCNC: 9 MG/DL (ref 8.5–10.1)
CHLORIDE BLD-SCNC: 105 MMOL/L (ref 94–109)
CHOLEST SERPL-MCNC: 145 MG/DL
CO2 SERPL-SCNC: 30 MMOL/L (ref 20–32)
CREAT SERPL-MCNC: 0.94 MG/DL (ref 0.66–1.25)
ERYTHROCYTE [DISTWIDTH] IN BLOOD BY AUTOMATED COUNT: 12.6 % (ref 10–15)
FASTING STATUS PATIENT QL REPORTED: NO
GFR SERPL CREATININE-BSD FRML MDRD: 81 ML/MIN/1.73M2
GLUCOSE BLD-MCNC: 87 MG/DL (ref 70–99)
HCT VFR BLD AUTO: 40.3 % (ref 40–53)
HDLC SERPL-MCNC: 58 MG/DL
HGB BLD-MCNC: 13.5 G/DL (ref 13.3–17.7)
LDLC SERPL CALC-MCNC: 70 MG/DL
MCH RBC QN AUTO: 32.2 PG (ref 26.5–33)
MCHC RBC AUTO-ENTMCNC: 33.5 G/DL (ref 31.5–36.5)
MCV RBC AUTO: 96 FL (ref 78–100)
NONHDLC SERPL-MCNC: 87 MG/DL
PLATELET # BLD AUTO: 226 10E3/UL (ref 150–450)
POTASSIUM BLD-SCNC: 3.8 MMOL/L (ref 3.4–5.3)
PROT SERPL-MCNC: 7.5 G/DL (ref 6.8–8.8)
RBC # BLD AUTO: 4.19 10E6/UL (ref 4.4–5.9)
SODIUM SERPL-SCNC: 138 MMOL/L (ref 133–144)
TRIGL SERPL-MCNC: 86 MG/DL
TSH SERPL DL<=0.005 MIU/L-ACNC: 1.48 MU/L (ref 0.4–4)
WBC # BLD AUTO: 4 10E3/UL (ref 4–11)

## 2021-11-03 PROCEDURE — 82040 ASSAY OF SERUM ALBUMIN: CPT | Mod: ZL | Performed by: NURSE PRACTITIONER

## 2021-11-03 PROCEDURE — 85027 COMPLETE CBC AUTOMATED: CPT | Mod: ZL | Performed by: NURSE PRACTITIONER

## 2021-11-03 PROCEDURE — G0463 HOSPITAL OUTPT CLINIC VISIT: HCPCS

## 2021-11-03 PROCEDURE — 99214 OFFICE O/P EST MOD 30 MIN: CPT | Performed by: NURSE PRACTITIONER

## 2021-11-03 PROCEDURE — 36415 COLL VENOUS BLD VENIPUNCTURE: CPT | Mod: ZL | Performed by: NURSE PRACTITIONER

## 2021-11-03 PROCEDURE — 84443 ASSAY THYROID STIM HORMONE: CPT | Mod: ZL | Performed by: NURSE PRACTITIONER

## 2021-11-03 PROCEDURE — 82465 ASSAY BLD/SERUM CHOLESTEROL: CPT | Mod: ZL | Performed by: NURSE PRACTITIONER

## 2021-11-03 RX ORDER — FAMOTIDINE 20 MG/1
20 TABLET, FILM COATED ORAL 2 TIMES DAILY PRN
Qty: 90 TABLET | Refills: 1 | Status: SHIPPED | OUTPATIENT
Start: 2021-11-03 | End: 2022-01-10

## 2021-11-03 RX ORDER — HYDROCODONE BITARTRATE AND ACETAMINOPHEN 5; 325 MG/1; MG/1
1 TABLET ORAL EVERY 4 HOURS PRN
Qty: 20 TABLET | Refills: 0 | Status: SHIPPED | OUTPATIENT
Start: 2021-11-03 | End: 2022-02-28

## 2021-11-03 ASSESSMENT — ANXIETY QUESTIONNAIRES
5. BEING SO RESTLESS THAT IT IS HARD TO SIT STILL: NOT AT ALL
GAD7 TOTAL SCORE: 0
6. BECOMING EASILY ANNOYED OR IRRITABLE: NOT AT ALL
7. FEELING AFRAID AS IF SOMETHING AWFUL MIGHT HAPPEN: NOT AT ALL
IF YOU CHECKED OFF ANY PROBLEMS ON THIS QUESTIONNAIRE, HOW DIFFICULT HAVE THESE PROBLEMS MADE IT FOR YOU TO DO YOUR WORK, TAKE CARE OF THINGS AT HOME, OR GET ALONG WITH OTHER PEOPLE: NOT DIFFICULT AT ALL
3. WORRYING TOO MUCH ABOUT DIFFERENT THINGS: NOT AT ALL
1. FEELING NERVOUS, ANXIOUS, OR ON EDGE: NOT AT ALL
4. TROUBLE RELAXING: NOT AT ALL
2. NOT BEING ABLE TO STOP OR CONTROL WORRYING: NOT AT ALL

## 2021-11-03 ASSESSMENT — MIFFLIN-ST. JEOR: SCORE: 1763.7

## 2021-11-03 ASSESSMENT — PATIENT HEALTH QUESTIONNAIRE - PHQ9: SUM OF ALL RESPONSES TO PHQ QUESTIONS 1-9: 2

## 2021-11-03 ASSESSMENT — PAIN SCALES - GENERAL: PAINLEVEL: EXTREME PAIN (8)

## 2021-11-03 NOTE — PATIENT INSTRUCTIONS
Assessment & Plan     1. Hyperlipidemia LDL goal <100  Continue zocor  - Lipid Profile; Future  - Comprehensive metabolic panel; Future  - TSH with free T4 reflex; Future  - CBC with platelets; Future    2. Gastroesophageal reflux disease with esophagitis without hemorrhage  Use as needed   - famotidine (PEPCID) 20 MG tablet; Take 1 tablet (20 mg) by mouth 2 times daily as needed (heartburn)  Dispense: 90 tablet; Refill: 1    3. On statin therapy  - Comprehensive metabolic panel; Future    4. Injury of finger of right hand, subsequent encounter  Continue following with Dr Klein  - HYDROcodone-acetaminophen (NORCO) 5-325 MG tablet; Take 1 tablet by mouth every 4 hours as needed for severe pain  Dispense: 20 tablet; Refill: 0    5. Laceration of right middle finger without foreign body with damage to nail, subsequent encounter  - HYDROcodone-acetaminophen (NORCO) 5-325 MG tablet; Take 1 tablet by mouth every 4 hours as needed for severe pain  Dispense: 20 tablet; Refill: 0    6. 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      Return in about 6 months (around 5/3/2022) for lipids, GERD.    Izzy Wagner, NP  [facility]

## 2021-11-03 NOTE — NURSING NOTE
Chief Complaint   Patient presents with     Lipids     Anxiety       Initial /70   Pulse 67   Temp 96.8  F (36  C) (Tympanic)   Ht 1.829 m (6')   Wt 97.1 kg (214 lb)   SpO2 98%   BMI 29.02 kg/m   Estimated body mass index is 29.02 kg/m  as calculated from the following:    Height as of this encounter: 1.829 m (6').    Weight as of this encounter: 97.1 kg (214 lb).  Medication Reconciliation: complete  Kierra Oseguera LPN

## 2021-11-04 ASSESSMENT — ANXIETY QUESTIONNAIRES: GAD7 TOTAL SCORE: 0

## 2021-11-09 ENCOUNTER — TRANSFERRED RECORDS (OUTPATIENT)
Dept: HEALTH INFORMATION MANAGEMENT | Facility: CLINIC | Age: 71
End: 2021-11-09

## 2021-12-07 ENCOUNTER — TRANSFERRED RECORDS (OUTPATIENT)
Dept: HEALTH INFORMATION MANAGEMENT | Facility: CLINIC | Age: 71
End: 2021-12-07

## 2022-01-10 DIAGNOSIS — K21.00 GASTROESOPHAGEAL REFLUX DISEASE WITH ESOPHAGITIS WITHOUT HEMORRHAGE: ICD-10-CM

## 2022-01-10 RX ORDER — FAMOTIDINE 20 MG/1
20 TABLET, FILM COATED ORAL 2 TIMES DAILY PRN
Qty: 90 TABLET | Refills: 1 | Status: SHIPPED | OUTPATIENT
Start: 2022-01-10 | End: 2022-08-04

## 2022-01-10 NOTE — TELEPHONE ENCOUNTER
Pepcid  Last Written Prescription Date: 11/3/21  Last Fill Quantity: 90 # of Refills: 1  Last Office Visit: 11/3/21

## 2022-01-21 ENCOUNTER — TRANSFERRED RECORDS (OUTPATIENT)
Dept: HEALTH INFORMATION MANAGEMENT | Facility: CLINIC | Age: 72
End: 2022-01-21

## 2022-02-25 ENCOUNTER — NURSE TRIAGE (OUTPATIENT)
Dept: FAMILY MEDICINE | Facility: OTHER | Age: 72
End: 2022-02-25
Payer: COMMERCIAL

## 2022-02-25 NOTE — TELEPHONE ENCOUNTER
"Patient states that he wears MukLuks and was told that MukLuks cause black toes.  He is also concerned about diabetes.  Patient does snow shoe and hauls in wood and water every day    Reason for Disposition    Patient wants to be seen    Answer Assessment - Initial Assessment Questions  1. ONSET: \"When did the pain start?\"       today  2. LOCATION: \"Where is the pain located?\"   (e.g., around nail, entire toe, at foot joint)       Skin of the toes  3. PAIN: \"How bad is the pain?\"    (Scale 1-10; or mild, moderate, severe)    -  MILD (1-3): doesn't interfere with normal activities     -  MODERATE (4-7): interferes with normal activities (e.g., work or school) or awakens from sleep, limping     -  SEVERE (8-10): excruciating pain, unable to do any normal activities, unable to walk      tender  4. APPEARANCE: \"What does the toe look like?\" (e.g., redness, swelling, bruising, pallor)      States they look black all of the toes 2/3 of the toes  5. CAUSE: \"What do you think is causing the toe pain?\"      unknown  6. OTHER SYMPTOMS: \"Do you have any other symptoms?\" (e.g., leg pain, rash, fever, numbness)      no  7. PREGNANCY: \"Is there any chance you are pregnant?\" \"When was your last menstrual period?\"      no    Protocols used: TOE PAIN-A-OH      "

## 2022-02-28 ENCOUNTER — OFFICE VISIT (OUTPATIENT)
Dept: FAMILY MEDICINE | Facility: OTHER | Age: 72
End: 2022-02-28
Attending: NURSE PRACTITIONER
Payer: COMMERCIAL

## 2022-02-28 VITALS
TEMPERATURE: 97.7 F | BODY MASS INDEX: 30.71 KG/M2 | HEART RATE: 74 BPM | RESPIRATION RATE: 16 BRPM | DIASTOLIC BLOOD PRESSURE: 68 MMHG | OXYGEN SATURATION: 97 % | WEIGHT: 226.7 LBS | HEIGHT: 72 IN | SYSTOLIC BLOOD PRESSURE: 116 MMHG

## 2022-02-28 DIAGNOSIS — Z91.89: Primary | ICD-10-CM

## 2022-02-28 DIAGNOSIS — B35.1 ONYCHOMYCOSIS: ICD-10-CM

## 2022-02-28 DIAGNOSIS — S69.91XD INJURY OF RIGHT HAND, SUBSEQUENT ENCOUNTER: ICD-10-CM

## 2022-02-28 PROCEDURE — G0463 HOSPITAL OUTPT CLINIC VISIT: HCPCS

## 2022-02-28 PROCEDURE — 99214 OFFICE O/P EST MOD 30 MIN: CPT | Performed by: NURSE PRACTITIONER

## 2022-02-28 RX ORDER — CICLOPIROX 80 MG/ML
SOLUTION TOPICAL
Qty: 6.6 ML | Refills: 1 | Status: SHIPPED | OUTPATIENT
Start: 2022-02-28 | End: 2023-04-27

## 2022-02-28 ASSESSMENT — PAIN SCALES - GENERAL: PAINLEVEL: NO PAIN (0)

## 2022-02-28 NOTE — PROGRESS NOTES
Assessment & Plan     1. Potential alteration in circulation  Compression stockings  Continue walking daily  Elevate legs when sitting  Decrease sodium to less than 2000mg    2. Injury of right hand, subsequent encounter  - Occupational Therapy Referral; Future    3. Onychomycosis  Follow-up if no improvement will consider referral to podiatry.   - ciclopirox (PENLAC) 8 % external solution; Apply to adjacent skin and affected nails daily.  Remove with alcohol every 7 days, then repeat.  Dispense: 6.6 mL; Refill: 1      Review of prior external note(s) from - previous labs         BMI:   Estimated body mass index is 30.75 kg/m  as calculated from the following:    Height as of this encounter: 1.829 m (6').    Weight as of this encounter: 102.8 kg (226 lb 11.2 oz).   Weight management plan: Discussed healthy diet and exercise guidelines    Follow-up in May as scheduled.     Izzy Wagner NP  [facility] - [place] Fernandez is a 72 year old who presents for the following health issues     HPI     Concern - Toe Pain all toes Bilateral feet  Onset: few months not sure   Description: discoloration to toes   Intensity: mild  Progression of Symptoms:  same  Accompanying Signs & Symptoms: burning and itching   Previous history of similar problem: no   Precipitating factors:        Worsened by: unknown   Alleviating factors:        Improved by: nothing   Therapies tried and outcome: None        Patient Active Problem List   Diagnosis     Hyperlipidemia LDL goal <100     Generalized anxiety disorder     Gastroesophageal reflux disease with esophagitis     Benign prostatic hyperplasia with urinary obstruction     Acute pancreatitis     Agoraphobia with panic disorder     Compression deformity of vertebra     Pericardial effusion     Personality disorder (H)     Unilateral inguinal hernia     Umbilical hernia     Past Surgical History:   Procedure Laterality Date     ABDOMEN SURGERY       hernia surgerys 3-4     CHOLECYSTECTOMY  2016    galbladder      COLONOSCOPY  12/06/2018    [place], negative for polyps 10 year      ENT SURGERY      tonsilectomy when little        Social History     Tobacco Use     Smoking status: Former Smoker     Packs/day: 1.00     Years: 20.00     Pack years: 20.00     Start date: 11/27/1971     Smokeless tobacco: Former User     Types: Chew   Substance Use Topics     Alcohol use: No     Family History   Problem Relation Age of Onset     Coronary Artery Disease Father      Other Cancer Brother          Current Outpatient Medications   Medication Sig Dispense Refill     aspirin 81 MG EC tablet Take 81 mg by mouth       famotidine (PEPCID) 20 MG tablet TAKE 1 TABLET (20 MG) BY MOUTH 2 TIMES DAILY AS NEEDED (HEARTBURN) 90 tablet 1     Multiple Vitamin (MULTI-VITAMINS) TABS        sildenafil (VIAGRA) 100 MG tablet TAKE 1 TABLET (100 MG) BY MOUTH DAILY AS NEEDED (TAKE 30 MIN TO 4 HOURS AS NEEDED PRIOR TO INTERCOURSE.) 12 tablet 5     simvastatin (ZOCOR) 40 MG tablet Take 1 tablet (40 mg) by mouth At Bedtime 90 tablet 1     No Known Allergies  Recent Labs   Lab Test 11/03/21  1357 04/29/21  1534 08/25/20  1634 02/24/20  0938   LDL 70 86 87 79   HDL 58 66 53 52   TRIG 86 48 52 79   ALT 20 18  --  20   CR 0.94 0.82 0.76 0.98   GFRESTIMATED 81 89 >90 78   GFRESTBLACK  --  >90 >90 >90   POTASSIUM 3.8 4.1 3.9 4.3   TSH 1.48 1.53 1.57 2.15      BP Readings from Last 3 Encounters:   02/28/22 116/68   11/03/21 124/70   10/22/21 122/62    Wt Readings from Last 3 Encounters:   02/28/22 102.8 kg (226 lb 11.2 oz)   11/03/21 97.1 kg (214 lb)   10/22/21 96.6 kg (213 lb)                      Review of Systems   Constitutional, HEENT, cardiovascular, pulmonary, gi and gu systems are negative, except as otherwise noted.      Objective    /68 (BP Location: Left arm, Patient Position: Chair, Cuff Size: Adult Large)   Pulse 74   Temp 97.7  F (36.5  C) (Tympanic)   Resp 16   Ht 1.829 m  (6')   Wt 102.8 kg (226 lb 11.2 oz)   SpO2 97%   BMI 30.75 kg/m    Body mass index is 30.75 kg/m .  Physical Exam   GENERAL: healthy, alert and no distress  RESP: lungs clear to auscultation - no rales, rhonchi or wheezes  CV: regular rate and rhythm, normal S1 S2, no S3 or S4, no murmur, click or rub, no peripheral edema and peripheral pulses strong  MS: lower extremities with minor vascular changes, no edema noted.  Toe nails are thick and yellow consistent with fungal infection.   PSYCH: mentation appears normal, affect normal/bright

## 2022-02-28 NOTE — NURSING NOTE
Chief Complaint   Patient presents with     Toe Pain       Initial /68 (BP Location: Left arm, Patient Position: Chair, Cuff Size: Adult Large)   Pulse 74   Temp 97.7  F (36.5  C) (Tympanic)   Resp 16   Ht 1.829 m (6')   Wt 102.8 kg (226 lb 11.2 oz)   SpO2 97%   BMI 30.75 kg/m   Estimated body mass index is 30.75 kg/m  as calculated from the following:    Height as of this encounter: 1.829 m (6').    Weight as of this encounter: 102.8 kg (226 lb 11.2 oz).  Medication Reconciliation: complete  Pamela M. Lechevalier, LPN

## 2022-02-28 NOTE — PATIENT INSTRUCTIONS
Assessment & Plan     1. Potential alteration in circulation  Compression stockings  Continue walking daily  Elevate legs when sitting  Decrease sodium to less than 2000mg    2. Injury of right hand, subsequent encounter  - Occupational Therapy Referral; Future    3. Onychomycosis  Follow-up if no improvement will consider referral to podiatry.   - ciclopirox (PENLAC) 8 % external solution; Apply to adjacent skin and affected nails daily.  Remove with alcohol every 7 days, then repeat.  Dispense: 6.6 mL; Refill: 1      Review of prior external note(s) from - previous labs         BMI:   Estimated body mass index is 30.75 kg/m  as calculated from the following:    Height as of this encounter: 1.829 m (6').    Weight as of this encounter: 102.8 kg (226 lb 11.2 oz).   Weight management plan: Discussed healthy diet and exercise guidelines    Follow-up in May as scheduled.     Izzy Wagner, NP  [facility]

## 2022-03-01 ENCOUNTER — HOSPITAL ENCOUNTER (OUTPATIENT)
Dept: OCCUPATIONAL THERAPY | Facility: HOSPITAL | Age: 72
Setting detail: THERAPIES SERIES
End: 2022-03-01
Attending: NURSE PRACTITIONER
Payer: MEDICARE

## 2022-03-01 DIAGNOSIS — S69.91XD INJURY OF RIGHT HAND, SUBSEQUENT ENCOUNTER: ICD-10-CM

## 2022-03-01 PROCEDURE — 97110 THERAPEUTIC EXERCISES: CPT | Mod: GO

## 2022-03-01 PROCEDURE — 97165 OT EVAL LOW COMPLEX 30 MIN: CPT | Mod: GO

## 2022-03-01 NOTE — PROGRESS NOTES
03/01/22 1400   Quick Adds   Quick Adds Certification   Therapy Certification   Certification date from 03/01/22   Certification date to 04/30/22   Medical Diagnosis injury to Rt long finger   Certification I certify the need for these services furnished under this plan of treatment and while under my care.  (Physician co-signature of this document indicates review and certification of the therapy plan).   General Information/History   Start Of Care Date 03/01/22   Referring Physician Sami Randall NP   Orders Evaluate And Treat As Indicated   Orders Date 02/28/22   Medical Diagnosis injury of Rt hand   Additional Occupational Profile Info/Pertinent history of current problem Pt had the tip of his long finger on his Rt hand pinched in a wood splitter resulting in soft tissue injury with laceration.   How/Where did it occur During contact with an object   Onset date of current episode/exacerbation 10/21/21   Chronicity New   Hand Dominance Right   Affected side Right   Functional limitations perform desired leisure / sports activities  (pick guitar)   Reported Symptoms Loss of strength;Loss of Motion/Stiffness   Prior level of function Independent ADL;Independent IADL   Important Activities walking, canoe, playing guitar   Living environment House[facility]   Patient role/Employment history Retired   Patient/Family goals statement pt reported he did not really want to come to therapy but his wife talke him into it.    General observations Pt presented alone today. His Rt long finger has a scar on around the DIP joint and the skin blanchable erythema when pushed into extension   Fall Risk Screen   Fall screen completed by OT   Have you fallen 2 or more times in the past year? Yes   Have you fallen and had an injury in the past year? No   Is patient a fall risk? No   Fall screen comments pt fell while carrying a canoe   Abuse Screen (yes response referral indicated)   Feels Unsafe at Home or Work/School no   Feels  Threatened by Someone no   Does Anyone Try to Keep You From Having Contact with Others or Doing Things Outside Your Home? no   Physical Signs of Abuse Present no   Pain   Pain Other (see comments)  (no pain)   Skin Appearance   Skin Appearance Red;Shiny  (shiny over scar on dorsum of finger)   Scar/Wound   Scar/Wound Scar   Scar   Scar location Rt long finger   Appearance Raised   Sensitivity Mild   Quality Smooth   Sensation Findings   Sensation Findings   (pt's protective sensation is intact)   Strength   Strength Strength    Avg - Left 100    Avg - Right 120   Lateral Pinch - Left 22   Lateral Pinch - Right 26   3 Point Pinch - Left 20   3 Point Pinch - Right 23   Education Assessment   Preferred Learning Style Reading   Barriers to Learning No barriers   Therapy Interventions   Planned Therapy Interventions Strengthening;ROM;Scar Management;Desensitization   Clinical Impression   Criteria for Skilled Therapeutic Interventions Met yes   OT Diagnosis sensitivity in dominant hand   Influenced by the following impairments Impaired sensation;Decreased range of motion   Assessment of Occupational Performance 1-3 Performance Deficits   Identified Performance Deficits pinch, fine motor tasks   Clinical Decision Making (Complexity) Low complexity   Therapy Frequency 1x/wk   Predicted Duration of Therapy Intervention (days/wks) 4 weeks   Risks and Benefits of Treatment have been explained. Yes   Patient, Family & other staff in agreement with plan of care Yes   Clinical Impression Comments Pt demonstrates good strength in hand and ROM is WFL however pt does have mild sensitivity over scar and has not returned to playing guitar due to inability to hold a pick. Pt stated he would rather not attend follow up OT sessions but agreed to start HEP with scar massage, desensitizing and theraputty exercises and will follow up with OT with any questions.    Hand Eval Goals   Hand Eval Goals 1;2   Hand Goal 1   Goal  Identifier LTG 1   Goal Description Pt will be able to follow HEP consistently for 2 weeks   Target Date 03/15/22   Hand Goal 2   Goal Identifier LTG 2   Goal Description Pt will be able to return to playing a guitar    Target Date 03/22/22   Total Evaluation Time   OT Khris, Low Complexity Minutes (52950) 40

## 2022-03-15 ENCOUNTER — TELEPHONE (OUTPATIENT)
Dept: OCCUPATIONAL THERAPY | Facility: HOSPITAL | Age: 72
End: 2022-03-15
Payer: COMMERCIAL

## 2022-03-30 NOTE — PROGRESS NOTES
Assessment & Plan     1. Skin tag  Skin tag removed via cryotherapy x 3 freeze/thaw cycles. Procedure tolerated very well. Instructed to watch for signs of infection including draining, tenderness, and erythema. Follow up in one month if skin tag has not disappeared.     Return in about 1 month (around 5/4/2022), or if symptoms worsen or fail to improve.     JAROD Jimenez    Patient is seen in conjunction with PA student.  History is reviewed with patient and pertinent portions of the exam are repeated.  Assessment and plan is reviewed with the patient.      Izzy Wagner NP  [facility] - [place] Fernandez is a 72 year old who presents for the following health issues     HPI     Concern - Lesion on temple   Onset: noted a few months   Description: small looks like skin tag  Intensity: mild  Progression of Symptoms:   feels like its gotten bigger   Accompanying Signs & Symptoms: aggravates above ear    Previous history of similar problem: no   Precipitating factors:        Worsened by: unknown   Alleviating factors:        Improved by: nothing   Therapies tried and outcome: None    He noticed it last summer but felt like it started growing a month ago. Irritates him every time he puts on or takes off glasses are on the left temporal region above the ear.      Review of Systems   Constitutional, HEENT, cardiovascular, pulmonary, gi and gu systems are negative, except as otherwise noted.      Objective    /62 (BP Location: Right arm, Patient Position: Chair, Cuff Size: Adult Large)   Pulse 69   Temp 97.8  F (36.6  C) (Tympanic)   Resp 16   Ht 1.829 m (6')   Wt 99.4 kg (219 lb 3.2 oz)   SpO2 97%   BMI 29.73 kg/m    Body mass index is 29.73 kg/m .  Physical Exam   GENERAL: healthy, alert and no distress  EYES: Eyes grossly normal to inspection, PERRL and conjunctivae and sclerae normal  NECK: no adenopathy, no asymmetry, masses, or scars and thyroid normal  to palpation  RESP: lungs clear to auscultation - no rales, rhonchi or wheezes  CV: regular rate and rhythm, normal S1 S2, no S3 or S4, no murmur, click or rub, no peripheral edema and peripheral pulses strong  MS: no gross musculoskeletal defects noted, no edema  SKIN: one 2 mm skin tag on left temporal region above ear; treated with cryotherapy 3 freeze/thaw cycles, tolerated procedure well.

## 2022-04-04 ENCOUNTER — OFFICE VISIT (OUTPATIENT)
Dept: FAMILY MEDICINE | Facility: OTHER | Age: 72
End: 2022-04-04
Attending: NURSE PRACTITIONER
Payer: MEDICARE

## 2022-04-04 VITALS
SYSTOLIC BLOOD PRESSURE: 120 MMHG | HEIGHT: 72 IN | HEART RATE: 69 BPM | BODY MASS INDEX: 29.69 KG/M2 | TEMPERATURE: 97.8 F | DIASTOLIC BLOOD PRESSURE: 62 MMHG | OXYGEN SATURATION: 97 % | WEIGHT: 219.2 LBS | RESPIRATION RATE: 16 BRPM

## 2022-04-04 DIAGNOSIS — L91.8 SKIN TAG: Primary | ICD-10-CM

## 2022-04-04 PROCEDURE — G0463 HOSPITAL OUTPT CLINIC VISIT: HCPCS | Mod: 25

## 2022-04-04 PROCEDURE — 17111 DESTRUCTION B9 LESIONS 15/>: CPT | Performed by: NURSE PRACTITIONER

## 2022-04-04 PROCEDURE — 17111 DESTRUCTION B9 LESIONS 15/>: CPT

## 2022-04-04 PROCEDURE — 99213 OFFICE O/P EST LOW 20 MIN: CPT | Mod: 25 | Performed by: NURSE PRACTITIONER

## 2022-04-04 PROCEDURE — G0463 HOSPITAL OUTPT CLINIC VISIT: HCPCS

## 2022-04-04 PROCEDURE — 17000 DESTRUCT PREMALG LESION: CPT | Performed by: NURSE PRACTITIONER

## 2022-04-04 ASSESSMENT — PAIN SCALES - GENERAL: PAINLEVEL: NO PAIN (0)

## 2022-04-04 NOTE — NURSING NOTE
Chief Complaint   Patient presents with     Lesion     on temple        Initial /62 (BP Location: Right arm, Patient Position: Chair, Cuff Size: Adult Large)   Pulse 69   Temp 97.8  F (36.6  C) (Tympanic)   Resp 16   Ht 1.829 m (6')   Wt 99.4 kg (219 lb 3.2 oz)   SpO2 97%   BMI 29.73 kg/m   Estimated body mass index is 29.73 kg/m  as calculated from the following:    Height as of this encounter: 1.829 m (6').    Weight as of this encounter: 99.4 kg (219 lb 3.2 oz).  Medication Reconciliation: complete  Pamela M. Lechevalier, LPN

## 2022-04-04 NOTE — PATIENT INSTRUCTIONS
Please call if frozen lesion has not disappeared in 1 month.  Please call if frozen lesion has not disappeared in 1 month.

## 2022-04-11 DIAGNOSIS — E78.5 HYPERLIPIDEMIA LDL GOAL <100: ICD-10-CM

## 2022-04-12 RX ORDER — SIMVASTATIN 40 MG
40 TABLET ORAL AT BEDTIME
Qty: 90 TABLET | Refills: 3 | Status: SHIPPED | OUTPATIENT
Start: 2022-04-12 | End: 2023-02-21

## 2022-04-20 ENCOUNTER — OFFICE VISIT (OUTPATIENT)
Dept: FAMILY MEDICINE | Facility: OTHER | Age: 72
End: 2022-04-20
Attending: NURSE PRACTITIONER
Payer: COMMERCIAL

## 2022-04-20 VITALS
SYSTOLIC BLOOD PRESSURE: 112 MMHG | WEIGHT: 222.8 LBS | OXYGEN SATURATION: 98 % | DIASTOLIC BLOOD PRESSURE: 70 MMHG | HEART RATE: 63 BPM | BODY MASS INDEX: 30.18 KG/M2 | TEMPERATURE: 96.9 F | HEIGHT: 72 IN | RESPIRATION RATE: 18 BRPM

## 2022-04-20 DIAGNOSIS — F40.243 ANXIETY WITH FLYING: ICD-10-CM

## 2022-04-20 DIAGNOSIS — E78.5 HYPERLIPIDEMIA LDL GOAL <100: Primary | ICD-10-CM

## 2022-04-20 DIAGNOSIS — F40.01 AGORAPHOBIA WITH PANIC DISORDER: ICD-10-CM

## 2022-04-20 DIAGNOSIS — K21.00 GASTROESOPHAGEAL REFLUX DISEASE WITH ESOPHAGITIS WITHOUT HEMORRHAGE: ICD-10-CM

## 2022-04-20 LAB
ALBUMIN SERPL-MCNC: 3.7 G/DL (ref 3.4–5)
ALP SERPL-CCNC: 44 U/L (ref 40–150)
ALT SERPL W P-5'-P-CCNC: 16 U/L (ref 0–70)
ANION GAP SERPL CALCULATED.3IONS-SCNC: 6 MMOL/L (ref 3–14)
AST SERPL W P-5'-P-CCNC: 13 U/L (ref 0–45)
BILIRUB SERPL-MCNC: 0.5 MG/DL (ref 0.2–1.3)
BUN SERPL-MCNC: 17 MG/DL (ref 7–30)
CALCIUM SERPL-MCNC: 9.3 MG/DL (ref 8.5–10.1)
CHLORIDE BLD-SCNC: 104 MMOL/L (ref 94–109)
CHOLEST SERPL-MCNC: 151 MG/DL
CO2 SERPL-SCNC: 27 MMOL/L (ref 20–32)
CREAT SERPL-MCNC: 0.91 MG/DL (ref 0.66–1.25)
FASTING STATUS PATIENT QL REPORTED: YES
GFR SERPL CREATININE-BSD FRML MDRD: 90 ML/MIN/1.73M2
GLUCOSE BLD-MCNC: 95 MG/DL (ref 70–99)
HDLC SERPL-MCNC: 60 MG/DL
HOLD SPECIMEN: NORMAL
LDLC SERPL CALC-MCNC: 80 MG/DL
NONHDLC SERPL-MCNC: 91 MG/DL
POTASSIUM BLD-SCNC: 3.9 MMOL/L (ref 3.4–5.3)
PROT SERPL-MCNC: 7.3 G/DL (ref 6.8–8.8)
SODIUM SERPL-SCNC: 137 MMOL/L (ref 133–144)
TRIGL SERPL-MCNC: 53 MG/DL
TSH SERPL DL<=0.005 MIU/L-ACNC: 1.51 MU/L (ref 0.4–4)

## 2022-04-20 PROCEDURE — 36415 COLL VENOUS BLD VENIPUNCTURE: CPT | Mod: ZL | Performed by: NURSE PRACTITIONER

## 2022-04-20 PROCEDURE — G0463 HOSPITAL OUTPT CLINIC VISIT: HCPCS

## 2022-04-20 PROCEDURE — 80053 COMPREHEN METABOLIC PANEL: CPT | Mod: ZL | Performed by: NURSE PRACTITIONER

## 2022-04-20 PROCEDURE — 99214 OFFICE O/P EST MOD 30 MIN: CPT | Performed by: NURSE PRACTITIONER

## 2022-04-20 PROCEDURE — 80061 LIPID PANEL: CPT | Mod: ZL | Performed by: NURSE PRACTITIONER

## 2022-04-20 PROCEDURE — 84443 ASSAY THYROID STIM HORMONE: CPT | Mod: ZL | Performed by: NURSE PRACTITIONER

## 2022-04-20 RX ORDER — LORAZEPAM 0.5 MG/1
TABLET ORAL
Qty: 4 TABLET | Refills: 0 | Status: SHIPPED | OUTPATIENT
Start: 2022-04-20 | End: 2023-04-27

## 2022-04-20 ASSESSMENT — PAIN SCALES - GENERAL: PAINLEVEL: NO PAIN (0)

## 2022-04-20 NOTE — PROGRESS NOTES
Assessment & Plan     1. Hyperlipidemia LDL goal <100  Continue zocor  - Lipid Profile (Chol, Trig, HDL, LDL calc); Future  - TSH with free T4 reflex; Future  - Comprehensive metabolic panel (BMP + Alb, Alk Phos, ALT, AST, Total. Bili, TP); Future    2. Gastroesophageal reflux disease with esophagitis without hemorrhage  Continue pepcid    3. Agoraphobia with panic disorder  - LORazepam (ATIVAN) 0.5 MG tablet; Take one tablet as needed for anxiety with flying.  Dispense: 4 tablet; Refill: 0    4. Anxiety with flying  - LORazepam (ATIVAN) 0.5 MG tablet; Take one tablet as needed for anxiety with flying.  Dispense: 4 tablet; Refill: 0     Return in about 6 months (around 10/20/2022) for lipids, GERD.    JAROD Jimenez    Patient is seen in conjunction with PA student.  History is reviewed with patient and pertinent portions of the exam are repeated.  Assessment and plan is reviewed with the patient.      Izzy Wagner NP  [facility] - [place] Fernandez is a 72 year old who presents for the following health issues     HPI     Hyperlipidemia Follow-Up      Are you regularly taking any medication or supplement to lower your cholesterol?   Yes- Zocor     Are you having muscle aches or other side effects that you think could be caused by your cholesterol lowering medication?  No   The 10-year ASCVD risk score ([place] SHANE Jr., et al., 2013) is: 13.7%    Values used to calculate the score:      Age: 72 years      Sex: Male      Is Non- : No      Diabetic: No      Tobacco smoker: No      Systolic Blood Pressure: 112 mmHg      Is BP treated: No      HDL Cholesterol: 58 mg/dL      Total Cholesterol: 145 mg/dL    GERD/Heartburn  Onset: many years     Description:     Burning in chest: no     Intensity: mild    Progression of Symptoms: improving    Accompanying Signs & Symptoms:  Does it feel like food gets stuck: no   Nausea: no   Vomiting (bloody?): no    Abdominal Pain: no   Black-Tarry stools: no :  Bloody stools: no     History:   Previous ulcers: no     Precipitating factors:   Caffeine use: YES  Alcohol use: no   NSAID/Aspirin use: YES  Tobacco use: no   Worse with no particular food or drink.    Alleviating factors:  pepcid  Therapies Tried and outcome:choco Fernandez hasn't had symptoms in many years. He quit drinking and avoids foods that cause reflux.      He is wondering if he can have something to calm his anxiety with flying. He is going to [place] to see his sister and has anxiety regarding flying and COVID and being around other people. His previous doctor gave him something last time he flew which helped and wondering if he can be prescribed something again.     Review of Systems   Constitutional, HEENT, cardiovascular, pulmonary, gi and gu systems are negative, except as otherwise noted.      Objective    /70 (BP Location: Left arm, Patient Position: Chair, Cuff Size: Adult Large)   Pulse 63   Temp 96.9  F (36.1  C) (Tympanic)   Resp 18   Ht 1.829 m (6')   Wt 101.1 kg (222 lb 12.8 oz)   SpO2 98%   BMI 30.22 kg/m    Body mass index is 30.22 kg/m .  Physical Exam   GENERAL: healthy, alert and no distress  RESP: lungs clear to auscultation - no rales, rhonchi or wheezes  CV: regular rate and rhythm, normal S1 S2, no S3 or S4, no murmur, click or rub, no peripheral edema and peripheral pulses strong  PSYCH: mentation appears normal, affect normal/bright

## 2022-04-20 NOTE — PATIENT INSTRUCTIONS
Assessment & Plan     1. Hyperlipidemia LDL goal <100  - Lipid Profile (Chol, Trig, HDL, LDL calc); Future  - TSH with free T4 reflex; Future  - Comprehensive metabolic panel (BMP + Alb, Alk Phos, ALT, AST, Total. Bili, TP); Future    2. Gastroesophageal reflux disease with esophagitis without hemorrhage  Continue pepcid    3. Agoraphobia with panic disorder  - LORazepam (ATIVAN) 0.5 MG tablet; Take one tablet as needed for anxiety with flying.  Dispense: 4 tablet; Refill: 0    4. Anxiety with flying  - LORazepam (ATIVAN) 0.5 MG tablet; Take one tablet as needed for anxiety with flying.  Dispense: 4 tablet; Refill: 0         Return in about 6 months (around 10/20/2022) for lipids, GERD.    Izzy Wagner, NP  [facility]

## 2022-04-20 NOTE — NURSING NOTE
Chief Complaint   Patient presents with     Lipids     Gastrophageal Reflux       Initial /70 (BP Location: Left arm, Patient Position: Chair, Cuff Size: Adult Large)   Pulse 63   Temp 96.9  F (36.1  C) (Tympanic)   Resp 18   Ht 1.829 m (6')   Wt 101.1 kg (222 lb 12.8 oz)   SpO2 98%   BMI 30.22 kg/m   Estimated body mass index is 30.22 kg/m  as calculated from the following:    Height as of this encounter: 1.829 m (6').    Weight as of this encounter: 101.1 kg (222 lb 12.8 oz).  Medication Reconciliation: complete  Pamela M. Lechevalier, LPN

## 2022-07-16 ENCOUNTER — HEALTH MAINTENANCE LETTER (OUTPATIENT)
Age: 72
End: 2022-07-16

## 2022-08-01 ENCOUNTER — NURSE TRIAGE (OUTPATIENT)
Dept: FAMILY MEDICINE | Facility: OTHER | Age: 72
End: 2022-08-01

## 2022-08-01 NOTE — TELEPHONE ENCOUNTER
"Pt spouse calling for appt with PCP.He fell while on a canoe trip in the [facility] on Thursday.Left ankle swollen. Laceration to palm of hand on right and left hand severely bruised per spouse.He has a queezy stomach today but on Saturday it was severe in the middle upper stomach. Spoke with PCP and pt should go to ED.Advised ED and they verbalized understanding.    Shaunna Nava RN      Reason for Disposition    Sounds like a serious injury to the triager    Additional Information    Negative: Serious injury with multiple fractures (broken bones)    Negative: [1] Major bleeding (e.g., actively dripping or spurting) AND [2] can't be stopped    Negative: Amputation    Negative: Looks like a dislocated joint (very crooked or deformed)    Negative: Sounds like a life-threatening emergency to the triager    Negative: Wound looks infected    Negative: Caused by an animal bite    Negative: Caused by a human bite    Negative: Puncture wound of foot    Negative: Toe injury is main concern    Negative: Cast problems or questions    Negative: Bullet wound, stabbed by knife, or other serious penetrating wound    Negative: Skin is split open or gaping (or length > 1/2 inch or 12 mm)    Negative: [1] Bleeding AND [2] won't stop after 10 minutes of direct pressure (using correct technique)    Negative: [1] Dirt in the wound AND [2] not removed with 15 minutes of scrubbing    Negative: Can't stand (bear weight) or walk    Negative: [1] Numbness (new loss of sensation) of toe(s) AND [2] present now    Answer Assessment - Initial Assessment Questions  1. MECHANISM: \"How did the injury happen?\" (e.g., twisting injury, direct blow)       He fell on canoe trip going down a rock  2. ONSET: \"When did the injury happen?\" (Minutes or hours ago)       7.28.2022  3. LOCATION: \"Where is the injury located?\"       Left ankle  4. APPEARANCE of INJURY: \"What does the injury look like?\"       Swollen and black and blue  5. WEIGHT-BEARING: " "\"Can you put weight on that foot?\" \"Can you walk (four steps or more)?\"        Yes he can  6. SIZE: For cuts, bruises, or swelling, ask: \"How large is it?\" (e.g., inches or centimeters;  entire joint)       Whole ankle  7. PAIN: \"Is there pain?\" If so, ask: \"How bad is the pain?\"    (e.g., Scale 1-10; or mild, moderate, severe)      2/10  8. TETANUS: For any breaks in the skin, ask: \"When was the last tetanus booster?\"     no  9. OTHER SYMPTOMS: \"Do you have any other symptoms?\"       queezy stomach and abdominal pain-2/10 middle upper Saturday, one cut right palm and the left hand bruised severely all from the fall,laceration about size of a quarter or more,  10. PREGNANCY: \"Is there any chance you are pregnant?\" \"When was your last menstrual period?\"        *No Answer*    Protocols used: ANKLE AND FOOT INJURY-A-AH      "

## 2022-08-03 ENCOUNTER — TELEPHONE (OUTPATIENT)
Dept: FAMILY MEDICINE | Facility: OTHER | Age: 72
End: 2022-08-03

## 2022-08-03 DIAGNOSIS — K21.00 GASTROESOPHAGEAL REFLUX DISEASE WITH ESOPHAGITIS WITHOUT HEMORRHAGE: ICD-10-CM

## 2022-08-03 NOTE — PROGRESS NOTES
Assessment & Plan     1. Constipation, unspecified constipation type  Increase water intake  miralax per package directions.     2. Abdominal pain, generalized  - XR ABDOMEN 2 VW (Clinic Performed); Future - large amount of stool noted,   - CBC with platelets and differential - normal  - Amylase - normal  - Lipase - normal  - Comprehensive metabolic panel - normal    3. Sprain of left ankle, unspecified ligament, subsequent encounter  improving    4. Closed displaced fracture of proximal phalanx of lesser toe of left foot with routine healing, subsequent encounter  Improving.     follow-up as needed     Izzy Wagner, NP  [facility] - [place] Fernandez is a 72 year old, presenting for the following health issues:  ER F/U      HPI     ED/UC Followup:    Facility:  [facility]   Date of visit: 8/1/22  Reason for visit: Left foot pain fall on rocks and hand pain  Current Status: CLosed displaced fracture of proximal phalanx         Emergency Department Course:    Patient interviewed and examined. He was in no acute distress. On exam, there is a wound on the right palm just inferior to the thumb, I removed 2 foreign bodies as noted in the procedure notes the wound was cleansed by myself, antibiotic ointment and a Band-Aid placed. Prophylactic Keflex given. The left foot was swollen and edematous. There is no medial or lateral malleolus pain with palpation and he had normal range of motion. Able to wiggle the toes without difficulty. There was tenderness just beneath the small toe with palpation. X-ray suggest an intra-articular fracture of the proximal phalanx. No other fracture was identified in the ankle or the foot. A postoperative shoe was placed. Recommended ice, elevation, and using the shoe when up ambulating. Referral to podiatry was made. Patient was given Keflex for prophylaxis given he had a foreign body in her skin for 5 days. Patient was discharged home with his wife.  Prescription sent. Advised follow-up with primary care for wound check once antibiotics were completed.    Ankle and toe are helaing nicely.  Main concern is abdominal pain that comes and goes.     Pain History:  When did you first notice your pain? - Acute Pain   Have you seen anyone else for your pain? No  Where in your body do you have pain? Abdominal/Flank Pain  Onset/Duration: 7/30/22  Description:   Character: Sharp  Location: clifford-umbilical region  Radiation: across abdomen   Intensity: moderate, severe  Progression of Symptoms:  intermittent  Accompanying Signs & Symptoms:  Fever/Chills: No  Gas/Bloating: No  Nausea: YES  Vomitting: No  Diarrhea: No  Constipation: No  Dysuria or Hematuria: No  History:   Trauma: No  Previous similar pain: No  Previous tests done: none  Precipitating factors:   Does the pain change with:     Food: No    Bowel Movement: No    Urination: No   Other factors:  No  Therapies tried and outcome: None      Recent Labs   Lab Test 04/20/22  0858 11/03/21  1357 04/29/21  1534 08/25/20  1634   LDL 80 70 86 87   HDL 60 58 66 53   TRIG 53 86 48 52   ALT 16 20 18  --    CR 0.91 0.94 0.82 0.76   GFRESTIMATED 90 81 89 >90   GFRESTBLACK  --   --  >90 >90   POTASSIUM 3.9 3.8 4.1 3.9   TSH 1.51 1.48 1.53 1.57      BP Readings from Last 3 Encounters:   08/04/22 120/66   04/20/22 112/70   04/04/22 120/62    Wt Readings from Last 3 Encounters:   08/04/22 100.7 kg (221 lb 14.4 oz)   04/20/22 101.1 kg (222 lb 12.8 oz)   04/04/22 99.4 kg (219 lb 3.2 oz)              Review of Systems   Constitutional, HEENT, cardiovascular, pulmonary, gi and gu systems are negative, except as otherwise noted.      Objective    /66 (BP Location: Right arm, Patient Position: Chair, Cuff Size: Adult Large)   Pulse 71   Temp 96.9  F (36.1  C) (Tympanic)   Resp 18   Ht 1.829 m (6')   Wt 100.7 kg (221 lb 14.4 oz)   SpO2 98%   BMI 30.10 kg/m    Body mass index is 30.1 kg/m .  Physical Exam   GENERAL: healthy,  alert and no distress  NECK: no adenopathy, no asymmetry, masses, or scars and thyroid normal to palpation  RESP: lungs clear to auscultation - no rales, rhonchi or wheezes  CV: regular rate and rhythm, normal S1 S2, no S3 or S4, no murmur, click or rub, no peripheral edema and peripheral pulses strong  ABDOMEN: mild tenderness of epigastric and upper quadrants.  No guarding and bowel sounds normal  MS: no gross musculoskeletal defects noted, no edema  PSYCH: mentation appears normal, affect normal/bright        Results for orders placed or performed in visit on 08/04/22   XR ABDOMEN 2 VW (Clinic Performed)     Status: None    Narrative    PROCEDURE: XR ABDOMEN 2 VIEWS 8/4/2022 10:23 AM    HISTORY: Abdominal pain, generalized    COMPARISONS: None.    TECHNIQUE: Flat and upright    FINDINGS: The intestinal gas pattern is normal. There is no  extraluminal gas or pathologic intra-abdominal calcifications. Apical  clips are seen in the abdomen.         Impression    IMPRESSION: Normal abdominal gas pattern    DAVID PERSON MD         SYSTEM ID:  [id number]   Results for orders placed or performed in visit on 08/04/22   Amylase     Status: Normal   Result Value Ref Range    Amylase 48 30 - 110 U/L   Lipase     Status: Normal   Result Value Ref Range    Lipase 185 73 - 393 U/L   Comprehensive metabolic panel     Status: Abnormal   Result Value Ref Range    Sodium 134 133 - 144 mmol/L    Potassium 3.8 3.4 - 5.3 mmol/L    Chloride 101 94 - 109 mmol/L    Carbon Dioxide (CO2) 31 20 - 32 mmol/L    Anion Gap 2 (L) 3 - 14 mmol/L    Urea Nitrogen 11 7 - 30 mg/dL    Creatinine 0.84 0.66 - 1.25 mg/dL    Calcium 9.1 8.5 - 10.1 mg/dL    Glucose 113 (H) 70 - 99 mg/dL    Alkaline Phosphatase 49 40 - 150 U/L    AST 14 0 - 45 U/L    ALT 24 0 - 70 U/L    Protein Total 7.5 6.8 - 8.8 g/dL    Albumin 3.4 3.4 - 5.0 g/dL    Bilirubin Total 0.4 0.2 - 1.3 mg/dL    GFR Estimate >90 >60 mL/min/1.73m2   CBC with platelets and differential      Status: Abnormal   Result Value Ref Range    WBC Count 4.9 4.0 - 11.0 10e3/uL    RBC Count 4.24 (L) 4.40 - 5.90 10e6/uL    Hemoglobin 13.9 13.3 - 17.7 g/dL    Hematocrit 40.7 40.0 - 53.0 %    MCV 96 78 - 100 fL    MCH 32.8 26.5 - 33.0 pg    MCHC 34.2 31.5 - 36.5 g/dL    RDW 13.2 10.0 - 15.0 %    Platelet Count 249 150 - 450 10e3/uL    % Neutrophils 61 %    % Lymphocytes 30 %    % Monocytes 8 %    % Eosinophils 0 %    % Basophils 1 %    Absolute Neutrophils 3.0 1.6 - 8.3 10e3/uL    Absolute Lymphocytes 1.5 0.8 - 5.3 10e3/uL    Absolute Monocytes 0.4 0.0 - 1.3 10e3/uL    Absolute Eosinophils 0.0 0.0 - 0.7 10e3/uL    Absolute Basophils 0.0 0.0 - 0.2 10e3/uL   CBC with platelets and differential     Status: Abnormal    Narrative    The following orders were created for panel order CBC with platelets and differential.  Procedure                               Abnormality         Status                     ---------                               -----------         ------                     CBC with platelets and d...[[id number]]  Abnormal            Final result                 Please view results for these tests on the individual orders.                 .  ..

## 2022-08-03 NOTE — TELEPHONE ENCOUNTER
Emergency Department and Urgent Care Follow-up      Reason for ER/UC visit: left toe fracture and laceration on right hand after fall on 7/28/22  o Date seen: 8/1/22      New or Worsening symptoms:  Nausea continues       Prescription Received/Picked up from Pharmacy?: yes cephalexin   o Medications started? yes  o Any questions or issues regarding your prescription?: no       Follow-up Results or Labs that are pending: no       Questions or concerns?: concerns with nausea continuing. At home covid test done on 8/3/22 with negative results.       ER Recommends Follow-up by: within a couple days with PCP       RN Recommendations: continue with antibiotic as prescribed by ED Provider.   o Appointment scheduled:   Next 5 appointments (look out 90 days)    Aug 04, 2022  9:30 AM  (Arrive by 9:15 AM)  SHORT with Izzy Wagner NP  [facility] ([facility] [address]  [place] [address]  [phone]   Oct 24, 2022  8:15 AM  (Arrive by 8:00 AM)  SHORT with Izzy Wagner NP  [facility] ([facility] ) [address]  [place] [address]  [phone]      o   o     If you start feeling worse, or have any further questions, please feel free to contact Nurse Triage at [phone].  If needing immediate medical attention at any time please call 911/Go to the ER.

## 2022-08-04 ENCOUNTER — ANCILLARY PROCEDURE (OUTPATIENT)
Dept: GENERAL RADIOLOGY | Facility: OTHER | Age: 72
End: 2022-08-04
Attending: NURSE PRACTITIONER
Payer: MEDICARE

## 2022-08-04 ENCOUNTER — OFFICE VISIT (OUTPATIENT)
Dept: FAMILY MEDICINE | Facility: OTHER | Age: 72
End: 2022-08-04
Attending: NURSE PRACTITIONER
Payer: COMMERCIAL

## 2022-08-04 VITALS
TEMPERATURE: 96.9 F | WEIGHT: 221.9 LBS | HEIGHT: 72 IN | DIASTOLIC BLOOD PRESSURE: 66 MMHG | SYSTOLIC BLOOD PRESSURE: 120 MMHG | OXYGEN SATURATION: 98 % | HEART RATE: 71 BPM | BODY MASS INDEX: 30.05 KG/M2 | RESPIRATION RATE: 18 BRPM

## 2022-08-04 DIAGNOSIS — S92.512D CLOSED DISPLACED FRACTURE OF PROXIMAL PHALANX OF LESSER TOE OF LEFT FOOT WITH ROUTINE HEALING, SUBSEQUENT ENCOUNTER: ICD-10-CM

## 2022-08-04 DIAGNOSIS — K59.00 CONSTIPATION, UNSPECIFIED CONSTIPATION TYPE: Primary | ICD-10-CM

## 2022-08-04 DIAGNOSIS — S93.402D SPRAIN OF LEFT ANKLE, UNSPECIFIED LIGAMENT, SUBSEQUENT ENCOUNTER: ICD-10-CM

## 2022-08-04 DIAGNOSIS — R10.84 ABDOMINAL PAIN, GENERALIZED: ICD-10-CM

## 2022-08-04 LAB
ALBUMIN SERPL-MCNC: 3.4 G/DL (ref 3.4–5)
ALP SERPL-CCNC: 49 U/L (ref 40–150)
ALT SERPL W P-5'-P-CCNC: 24 U/L (ref 0–70)
AMYLASE SERPL-CCNC: 48 U/L (ref 30–110)
ANION GAP SERPL CALCULATED.3IONS-SCNC: 2 MMOL/L (ref 3–14)
AST SERPL W P-5'-P-CCNC: 14 U/L (ref 0–45)
BASOPHILS # BLD AUTO: 0 10E3/UL (ref 0–0.2)
BASOPHILS NFR BLD AUTO: 1 %
BILIRUB SERPL-MCNC: 0.4 MG/DL (ref 0.2–1.3)
BUN SERPL-MCNC: 11 MG/DL (ref 7–30)
CALCIUM SERPL-MCNC: 9.1 MG/DL (ref 8.5–10.1)
CHLORIDE BLD-SCNC: 101 MMOL/L (ref 94–109)
CO2 SERPL-SCNC: 31 MMOL/L (ref 20–32)
CREAT SERPL-MCNC: 0.84 MG/DL (ref 0.66–1.25)
EOSINOPHIL # BLD AUTO: 0 10E3/UL (ref 0–0.7)
EOSINOPHIL NFR BLD AUTO: 0 %
ERYTHROCYTE [DISTWIDTH] IN BLOOD BY AUTOMATED COUNT: 13.2 % (ref 10–15)
GFR SERPL CREATININE-BSD FRML MDRD: >90 ML/MIN/1.73M2
GLUCOSE BLD-MCNC: 113 MG/DL (ref 70–99)
HCT VFR BLD AUTO: 40.7 % (ref 40–53)
HGB BLD-MCNC: 13.9 G/DL (ref 13.3–17.7)
LIPASE SERPL-CCNC: 185 U/L (ref 73–393)
LYMPHOCYTES # BLD AUTO: 1.5 10E3/UL (ref 0.8–5.3)
LYMPHOCYTES NFR BLD AUTO: 30 %
MCH RBC QN AUTO: 32.8 PG (ref 26.5–33)
MCHC RBC AUTO-ENTMCNC: 34.2 G/DL (ref 31.5–36.5)
MCV RBC AUTO: 96 FL (ref 78–100)
MONOCYTES # BLD AUTO: 0.4 10E3/UL (ref 0–1.3)
MONOCYTES NFR BLD AUTO: 8 %
NEUTROPHILS # BLD AUTO: 3 10E3/UL (ref 1.6–8.3)
NEUTROPHILS NFR BLD AUTO: 61 %
PLATELET # BLD AUTO: 249 10E3/UL (ref 150–450)
POTASSIUM BLD-SCNC: 3.8 MMOL/L (ref 3.4–5.3)
PROT SERPL-MCNC: 7.5 G/DL (ref 6.8–8.8)
RBC # BLD AUTO: 4.24 10E6/UL (ref 4.4–5.9)
SODIUM SERPL-SCNC: 134 MMOL/L (ref 133–144)
WBC # BLD AUTO: 4.9 10E3/UL (ref 4–11)

## 2022-08-04 PROCEDURE — 99214 OFFICE O/P EST MOD 30 MIN: CPT | Performed by: NURSE PRACTITIONER

## 2022-08-04 PROCEDURE — 74019 RADEX ABDOMEN 2 VIEWS: CPT | Mod: TC,FY

## 2022-08-04 PROCEDURE — 85025 COMPLETE CBC W/AUTO DIFF WBC: CPT | Mod: ZL | Performed by: NURSE PRACTITIONER

## 2022-08-04 PROCEDURE — 80053 COMPREHEN METABOLIC PANEL: CPT | Mod: ZL | Performed by: NURSE PRACTITIONER

## 2022-08-04 PROCEDURE — 82040 ASSAY OF SERUM ALBUMIN: CPT | Mod: ZL | Performed by: NURSE PRACTITIONER

## 2022-08-04 PROCEDURE — 83690 ASSAY OF LIPASE: CPT | Mod: ZL | Performed by: NURSE PRACTITIONER

## 2022-08-04 PROCEDURE — 36415 COLL VENOUS BLD VENIPUNCTURE: CPT | Mod: ZL | Performed by: NURSE PRACTITIONER

## 2022-08-04 PROCEDURE — 82150 ASSAY OF AMYLASE: CPT | Mod: ZL | Performed by: NURSE PRACTITIONER

## 2022-08-04 PROCEDURE — G0463 HOSPITAL OUTPT CLINIC VISIT: HCPCS

## 2022-08-04 RX ORDER — CEPHALEXIN 500 MG/1
500 TABLET ORAL
COMMUNITY
Start: 2022-08-01 | End: 2022-08-06

## 2022-08-04 RX ORDER — FAMOTIDINE 20 MG/1
20 TABLET, FILM COATED ORAL 2 TIMES DAILY PRN
Qty: 90 TABLET | Refills: 2 | Status: SHIPPED | OUTPATIENT
Start: 2022-08-04 | End: 2023-10-16

## 2022-08-04 ASSESSMENT — PAIN SCALES - GENERAL: PAINLEVEL: NO PAIN (0)

## 2022-08-04 NOTE — NURSING NOTE
Chief Complaint   Patient presents with     ER F/U       Initial /66 (BP Location: Right arm, Patient Position: Chair, Cuff Size: Adult Large)   Pulse 71   Temp 96.9  F (36.1  C) (Tympanic)   Resp 18   Ht 1.829 m (6')   Wt 100.7 kg (221 lb 14.4 oz)   SpO2 98%   BMI 30.10 kg/m   Estimated body mass index is 30.1 kg/m  as calculated from the following:    Height as of this encounter: 1.829 m (6').    Weight as of this encounter: 100.7 kg (221 lb 14.4 oz).  Medication Reconciliation: complete  Pamela M. Lechevalier, LPN

## 2022-08-21 NOTE — PROGRESS NOTES
Assessment & Plan     1. Atherosclerosis of artery of left lower extremity (H)  - US KWADWO Doppler No Exercise 1-2 Levels Bilateral        Review of prior external note(s) from - CareEverywhere information from Urgent care notes reviewed         BMI:   Estimated body mass index is 29.97 kg/m  as calculated from the following:    Height as of this encounter: 1.829 m (6').    Weight as of this encounter: 100.2 kg (221 lb).       Will notify of results as they are returned.     Izzy Wagner NP  [facility] - [place] Fernandez is a 72 year old accompanied by his spouse, presenting for the following health issues:  Results      HPI     Concern - Follow up from podiatry and ED  Description: left leg hardening of arteries noted on ankle XR at urgent care visit for toe fracture.      He is taking zocor, non-smoker and exercises daily.      The 10-year ASCVD risk score ([place]jonathan SHANE Jr., et al., 2013) is: 16.7%    Values used to calculate the score:      Age: 72 years      Sex: Male      Is Non- : No      Diabetic: No      Tobacco smoker: No      Systolic Blood Pressure: 126 mmHg      Is BP treated: No      HDL Cholesterol: 60 mg/dL      Total Cholesterol: 151 mg/dL        Review of Systems   Constitutional, HEENT, cardiovascular, pulmonary, gi and gu systems are negative, except as otherwise noted.      Objective    /74 (BP Location: Right arm, Patient Position: Chair, Cuff Size: Adult Large)   Pulse 67   Temp 97.2  F (36.2  C) (Tympanic)   Resp 16   Ht 1.829 m (6')   Wt 100.2 kg (221 lb)   SpO2 98%   BMI 29.97 kg/m    Body mass index is 29.97 kg/m .  Physical Exam   GENERAL: healthy, alert and no distress  MS: no gross musculoskeletal defects noted, no edema  PSYCH: mentation appears normal, affect normal/bright        XR FOOT LEFT 3 OR MORE VIEWS   VRHRAD     REFERRING PROVIDER: SHIKHA TOLBERT     REPORT DATE: 2022 11:22 AM     PATIENT :  1950, 72 years     HISTORY PROVIDED: Swelling, pain, eval for fracture.       FINDINGS: Small calcaneal spurs. Atherosclerotic disease. Moderate DJD in the mid foot. Mild involving interphalangeal and moderate in the first MTP joint. Irregularity of the fifth proximal phalanx consistent with a minimally displaced fracture which extends intra-articular.     No other fracture is seen.     IMPRESSION: Intra-articular fracture of the proximal phalanx of the fifth toe.     Dictated By: Con Henley MD 8/1/2022 11:22 AM   Edited By: JENY 8/1/2022 11:27 AM       .  ..

## 2022-08-24 ENCOUNTER — OFFICE VISIT (OUTPATIENT)
Dept: FAMILY MEDICINE | Facility: OTHER | Age: 72
End: 2022-08-24
Attending: NURSE PRACTITIONER
Payer: COMMERCIAL

## 2022-08-24 VITALS
HEIGHT: 72 IN | BODY MASS INDEX: 29.93 KG/M2 | WEIGHT: 221 LBS | OXYGEN SATURATION: 98 % | RESPIRATION RATE: 16 BRPM | HEART RATE: 67 BPM | TEMPERATURE: 97.2 F | DIASTOLIC BLOOD PRESSURE: 74 MMHG | SYSTOLIC BLOOD PRESSURE: 126 MMHG

## 2022-08-24 DIAGNOSIS — I70.202 ATHEROSCLEROSIS OF ARTERY OF LEFT LOWER EXTREMITY (H): Primary | ICD-10-CM

## 2022-08-24 PROCEDURE — 99214 OFFICE O/P EST MOD 30 MIN: CPT | Performed by: NURSE PRACTITIONER

## 2022-08-24 PROCEDURE — G0463 HOSPITAL OUTPT CLINIC VISIT: HCPCS

## 2022-08-24 ASSESSMENT — PAIN SCALES - GENERAL: PAINLEVEL: NO PAIN (0)

## 2022-08-24 NOTE — NURSING NOTE
Chief Complaint   Patient presents with     Results       Initial /74 (BP Location: Right arm, Patient Position: Chair, Cuff Size: Adult Large)   Pulse 67   Temp 97.2  F (36.2  C) (Tympanic)   Resp 16   Ht 1.829 m (6')   Wt 100.2 kg (221 lb)   SpO2 98%   BMI 29.97 kg/m   Estimated body mass index is 29.97 kg/m  as calculated from the following:    Height as of this encounter: 1.829 m (6').    Weight as of this encounter: 100.2 kg (221 lb).  Medication Reconciliation: complete  Pamela M. Lechevalier, LPN

## 2022-08-28 ENCOUNTER — MYC MEDICAL ADVICE (OUTPATIENT)
Dept: FAMILY MEDICINE | Facility: OTHER | Age: 72
End: 2022-08-28

## 2022-09-07 ENCOUNTER — HOSPITAL ENCOUNTER (OUTPATIENT)
Dept: ULTRASOUND IMAGING | Facility: HOSPITAL | Age: 72
Discharge: HOME | End: 2022-09-07
Attending: NURSE PRACTITIONER | Admitting: NURSE PRACTITIONER
Payer: MEDICARE

## 2022-09-07 DIAGNOSIS — R09.89 OTHER SPECIFIED SYMPTOMS AND SIGNS INVOLVING THE CIRCULATORY AND RESPIRATORY SYSTEMS: ICD-10-CM

## 2022-09-07 DIAGNOSIS — I70.202 ATHEROSCLEROSIS OF ARTERY OF LEFT LOWER EXTREMITY (H): ICD-10-CM

## 2022-09-07 PROCEDURE — 93922 UPR/L XTREMITY ART 2 LEVELS: CPT

## 2022-09-08 ENCOUNTER — MYC MEDICAL ADVICE (OUTPATIENT)
Dept: FAMILY MEDICINE | Facility: OTHER | Age: 72
End: 2022-09-08

## 2022-09-08 DIAGNOSIS — R68.89 ABNORMAL ANKLE BRACHIAL INDEX (ABI): Primary | ICD-10-CM

## 2022-09-08 DIAGNOSIS — Q27.32 ARTERIOVENOUS MALFORMATION OF VESSEL OF LOWER LIMB: ICD-10-CM

## 2022-09-18 ENCOUNTER — HEALTH MAINTENANCE LETTER (OUTPATIENT)
Age: 72
End: 2022-09-18

## 2022-09-20 ENCOUNTER — HOSPITAL ENCOUNTER (OUTPATIENT)
Dept: MRI IMAGING | Facility: HOSPITAL | Age: 72
Discharge: HOME | End: 2022-09-20
Attending: NURSE PRACTITIONER | Admitting: NURSE PRACTITIONER
Payer: MEDICARE

## 2022-09-20 ENCOUNTER — MYC MEDICAL ADVICE (OUTPATIENT)
Dept: FAMILY MEDICINE | Facility: OTHER | Age: 72
End: 2022-09-20

## 2022-09-20 DIAGNOSIS — Q27.32 ARTERIOVENOUS MALFORMATION OF VESSEL OF LOWER LIMB: ICD-10-CM

## 2022-09-20 DIAGNOSIS — R68.89 ABNORMAL ANKLE BRACHIAL INDEX (ABI): ICD-10-CM

## 2022-09-20 PROCEDURE — 255N000002 HC RX 255 OP 636: Performed by: RADIOLOGY

## 2022-09-20 PROCEDURE — A9585 GADOBUTROL INJECTION: HCPCS | Performed by: RADIOLOGY

## 2022-09-20 PROCEDURE — 73725 MR ANG LWR EXT W OR W/O DYE: CPT | Mod: MG

## 2022-09-20 RX ORDER — GADOBUTROL 604.72 MG/ML
10 INJECTION INTRAVENOUS ONCE
Status: COMPLETED | OUTPATIENT
Start: 2022-09-20 | End: 2022-09-20

## 2022-09-20 RX ADMIN — GADOBUTROL 10 ML: 604.72 INJECTION INTRAVENOUS at 07:36

## 2022-09-21 DIAGNOSIS — I70.203 ATHEROSCLEROSIS OF ARTERY OF BOTH LOWER EXTREMITIES (H): ICD-10-CM

## 2022-09-21 DIAGNOSIS — R68.89 ABNORMAL ANKLE BRACHIAL INDEX (ABI): Primary | ICD-10-CM

## 2022-09-28 ENCOUNTER — MYC MEDICAL ADVICE (OUTPATIENT)
Dept: FAMILY MEDICINE | Facility: OTHER | Age: 72
End: 2022-09-28

## 2022-10-11 ENCOUNTER — TRANSFERRED RECORDS (OUTPATIENT)
Dept: HEALTH INFORMATION MANAGEMENT | Facility: CLINIC | Age: 72
End: 2022-10-11

## 2022-10-20 NOTE — PROGRESS NOTES
Assessment & Plan     1. Hyperlipidemia LDL goal <100  Continue zocor  - Lipid Profile; Future  - Comprehensive metabolic panel; Future  - TSH with free T4 reflex; Future    2. Gastroesophageal reflux disease with esophagitis without hemorrhage  Continue pepcid     3. Generalized anxiety disorder  Stable     4. On statin therapy  - Comprehensive metabolic panel; Future      Review of prior external note(s) from - CareEverywhere information from vascular notes reviewed         BMI:   Estimated body mass index is 30.42 kg/m  as calculated from the following:    Height as of this encounter: 1.829 m (6').    Weight as of this encounter: 101.7 kg (224 lb 4.8 oz).         Return in about 6 months (around 2023) for lipids, anxiety.    Izzy Wagner NP  [facility] - JUANCHO Fernandez is a 72 year old, presenting for the following health issues:  Lipids and Anxiety      HPI     Hyperlipidemia Follow-Up      Are you regularly taking any medication or supplement to lower your cholesterol?   Yes- zocor     Are you having muscle aches or other side effects that you think could be caused by your cholesterol lowering medication?  No    Anxiety Follow-Up    How are you doing with your anxiety since your last visit? No change    Are you having other symptoms that might be associated with anxiety? No    Have you had a significant life event? No     Are you feeling depressed? Yes:  but refused phq     Do you have any concerns with your use of alcohol or other drugs? No    Social History     Tobacco Use     Smoking status: Former     Packs/day: 1.00     Years: 20.00     Pack years: 20.00     Types: Cigarettes     Quit date: 1971     Years since quittin.9     Smokeless tobacco: Former     Types: Chew   Substance Use Topics     Alcohol use: No     Drug use: No     RAMO-7 SCORE 2020 11/3/2021 10/24/2022   Total Score 1 0 0     PHQ 2020 2020 11/3/2021   PHQ-9 Total Score 14  9 2   Q9: Thoughts of better off dead/self-harm past 2 weeks Not at all Not at all Not at all     Last PHQ-9 10/24/2022   1.  Little interest or pleasure in doing things -   2.  Feeling down, depressed, or hopeless -   3.  Trouble falling or staying asleep, or sleeping too much -   4.  Feeling tired or having little energy -   5.  Poor appetite or overeating -   6.  Feeling bad about yourself -   7.  Trouble concentrating -   8.  Moving slowly or restless -   Q9: Thoughts of better off dead/self-harm past 2 weeks -   PHQ-9 Total Score -   Difficulty at work, home, or with people (No Data)     RAMO-7  10/24/2022   1. Feeling nervous, anxious, or on edge 0   2. Not being able to stop or control worrying 0   3. Worrying too much about different things 0   4. Trouble relaxing 0   5. Being so restless that it is hard to sit still 0   6. Becoming easily annoyed or irritable 0   7. Feeling afraid, as if something awful might happen 0   RAMO-7 Total Score 0   If you checked any problems, how difficult have they made it for you to do your work, take care of things at home, or get along with other people? Not difficult at all               Review of Systems   Constitutional, HEENT, cardiovascular, pulmonary, gi and gu systems are negative, except as otherwise noted.      Objective    /68 (BP Location: Left arm, Patient Position: Chair, Cuff Size: Adult Regular)   Pulse 72   Temp 97.6  F (36.4  C) (Tympanic)   Resp 18   Ht 1.829 m (6')   Wt 101.7 kg (224 lb 4.8 oz)   SpO2 98%   BMI 30.42 kg/m    Body mass index is 30.42 kg/m .  Physical Exam   GENERAL: healthy, alert and no distress  NECK: no adenopathy, no asymmetry, masses, or scars, thyroid normal to palpation and no carotid bruits  RESP: lungs clear to auscultation - no rales, rhonchi or wheezes  CV: regular rate and rhythm, normal S1 S2, no S3 or S4, no murmur, click or rub, no peripheral edema and peripheral pulses strong  MS: no gross musculoskeletal defects  noted, no edema  PSYCH: mentation appears normal, affect normal/bright        Results for orders placed or performed in visit on 10/24/22   Lipid Profile     Status: Normal   Result Value Ref Range    Cholesterol 142 <200 mg/dL    Triglycerides 56 <150 mg/dL    Direct Measure HDL 58 >=40 mg/dL    LDL Cholesterol Calculated 73 <=100 mg/dL    Non HDL Cholesterol 84 <130 mg/dL    Narrative    Cholesterol  Desirable:  <200 mg/dL    Triglycerides  Normal:  Less than 150 mg/dL  Borderline High:  150-199 mg/dL  High:  200-499 mg/dL  Very High:  Greater than or equal to 500 mg/dL    Direct Measure HDL  Female:  Greater than or equal to 50 mg/dL   Male:  Greater than or equal to 40 mg/dL    LDL Cholesterol  Desirable:  <100mg/dL  Above Desirable:  100-129 mg/dL   Borderline High:  130-159 mg/dL   High:  160-189 mg/dL   Very High:  >= 190 mg/dL    Non HDL Cholesterol  Desirable:  130 mg/dL  Above Desirable:  130-159 mg/dL  Borderline High:  160-189 mg/dL  High:  190-219 mg/dL  Very High:  Greater than or equal to 220 mg/dL   Comprehensive metabolic panel     Status: Abnormal   Result Value Ref Range    Sodium 140 136 - 145 mmol/L    Potassium 4.1 3.4 - 5.3 mmol/L    Chloride 102 98 - 107 mmol/L    Carbon Dioxide (CO2) 27 22 - 29 mmol/L    Anion Gap 11 7 - 15 mmol/L    Urea Nitrogen 9.9 8.0 - 23.0 mg/dL    Creatinine 0.91 0.67 - 1.17 mg/dL    Calcium 9.0 8.8 - 10.2 mg/dL    Glucose 100 (H) 70 - 99 mg/dL    Alkaline Phosphatase 58 40 - 129 U/L    AST 21 10 - 50 U/L    ALT 15 10 - 50 U/L    Protein Total 7.2 6.4 - 8.3 g/dL    Albumin 4.2 3.5 - 5.2 g/dL    Bilirubin Total 0.4 <=1.2 mg/dL    GFR Estimate 90 >60 mL/min/1.73m2   TSH with free T4 reflex     Status: Normal   Result Value Ref Range    TSH 1.78 0.30 - 4.20 uIU/mL   Extra Tube     Status: None    Narrative    The following orders were created for panel order Extra Tube.  Procedure                               Abnormality         Status                     ---------                                -----------         ------                     Extra Purple Top Tube[[id number]]                            Final result                 Please view results for these tests on the individual orders.   Extra Purple Top Tube     Status: None   Result Value Ref Range    Hold Specimen [facility]

## 2022-10-24 ENCOUNTER — OFFICE VISIT (OUTPATIENT)
Dept: FAMILY MEDICINE | Facility: OTHER | Age: 72
End: 2022-10-24
Attending: NURSE PRACTITIONER
Payer: COMMERCIAL

## 2022-10-24 VITALS
TEMPERATURE: 97.6 F | RESPIRATION RATE: 18 BRPM | HEIGHT: 72 IN | DIASTOLIC BLOOD PRESSURE: 68 MMHG | HEART RATE: 72 BPM | WEIGHT: 224.3 LBS | SYSTOLIC BLOOD PRESSURE: 116 MMHG | OXYGEN SATURATION: 98 % | BODY MASS INDEX: 30.38 KG/M2

## 2022-10-24 DIAGNOSIS — K21.00 GASTROESOPHAGEAL REFLUX DISEASE WITH ESOPHAGITIS WITHOUT HEMORRHAGE: ICD-10-CM

## 2022-10-24 DIAGNOSIS — E78.5 HYPERLIPIDEMIA LDL GOAL <100: Primary | ICD-10-CM

## 2022-10-24 DIAGNOSIS — Z79.899 ON STATIN THERAPY: ICD-10-CM

## 2022-10-24 DIAGNOSIS — F41.1 GENERALIZED ANXIETY DISORDER: ICD-10-CM

## 2022-10-24 LAB
ALBUMIN SERPL BCG-MCNC: 4.2 G/DL (ref 3.5–5.2)
ALP SERPL-CCNC: 58 U/L (ref 40–129)
ALT SERPL W P-5'-P-CCNC: 15 U/L (ref 10–50)
ANION GAP SERPL CALCULATED.3IONS-SCNC: 11 MMOL/L (ref 7–15)
AST SERPL W P-5'-P-CCNC: 21 U/L (ref 10–50)
BILIRUB SERPL-MCNC: 0.4 MG/DL
BUN SERPL-MCNC: 9.9 MG/DL (ref 8–23)
CALCIUM SERPL-MCNC: 9 MG/DL (ref 8.8–10.2)
CHLORIDE SERPL-SCNC: 102 MMOL/L (ref 98–107)
CHOLEST SERPL-MCNC: 142 MG/DL
CREAT SERPL-MCNC: 0.91 MG/DL (ref 0.67–1.17)
DEPRECATED HCO3 PLAS-SCNC: 27 MMOL/L (ref 22–29)
GFR SERPL CREATININE-BSD FRML MDRD: 90 ML/MIN/1.73M2
GLUCOSE SERPL-MCNC: 100 MG/DL (ref 70–99)
HDLC SERPL-MCNC: 58 MG/DL
HOLD SPECIMEN: NORMAL
LDLC SERPL CALC-MCNC: 73 MG/DL
NONHDLC SERPL-MCNC: 84 MG/DL
POTASSIUM SERPL-SCNC: 4.1 MMOL/L (ref 3.4–5.3)
PROT SERPL-MCNC: 7.2 G/DL (ref 6.4–8.3)
SODIUM SERPL-SCNC: 140 MMOL/L (ref 136–145)
TRIGL SERPL-MCNC: 56 MG/DL
TSH SERPL DL<=0.005 MIU/L-ACNC: 1.78 UIU/ML (ref 0.3–4.2)

## 2022-10-24 PROCEDURE — 36415 COLL VENOUS BLD VENIPUNCTURE: CPT | Mod: ZL | Performed by: NURSE PRACTITIONER

## 2022-10-24 PROCEDURE — 80061 LIPID PANEL: CPT | Mod: ZL | Performed by: NURSE PRACTITIONER

## 2022-10-24 PROCEDURE — 84443 ASSAY THYROID STIM HORMONE: CPT | Mod: ZL | Performed by: NURSE PRACTITIONER

## 2022-10-24 PROCEDURE — G0463 HOSPITAL OUTPT CLINIC VISIT: HCPCS

## 2022-10-24 PROCEDURE — 80053 COMPREHEN METABOLIC PANEL: CPT | Mod: ZL | Performed by: NURSE PRACTITIONER

## 2022-10-24 PROCEDURE — 99214 OFFICE O/P EST MOD 30 MIN: CPT | Performed by: NURSE PRACTITIONER

## 2022-10-24 ASSESSMENT — ANXIETY QUESTIONNAIRES
IF YOU CHECKED OFF ANY PROBLEMS ON THIS QUESTIONNAIRE, HOW DIFFICULT HAVE THESE PROBLEMS MADE IT FOR YOU TO DO YOUR WORK, TAKE CARE OF THINGS AT HOME, OR GET ALONG WITH OTHER PEOPLE: NOT DIFFICULT AT ALL
GAD7 TOTAL SCORE: 0
7. FEELING AFRAID AS IF SOMETHING AWFUL MIGHT HAPPEN: NOT AT ALL
3. WORRYING TOO MUCH ABOUT DIFFERENT THINGS: NOT AT ALL
GAD7 TOTAL SCORE: 0
1. FEELING NERVOUS, ANXIOUS, OR ON EDGE: NOT AT ALL
5. BEING SO RESTLESS THAT IT IS HARD TO SIT STILL: NOT AT ALL
4. TROUBLE RELAXING: NOT AT ALL
2. NOT BEING ABLE TO STOP OR CONTROL WORRYING: NOT AT ALL
6. BECOMING EASILY ANNOYED OR IRRITABLE: NOT AT ALL

## 2022-10-24 ASSESSMENT — PAIN SCALES - GENERAL: PAINLEVEL: NO PAIN (0)

## 2022-10-24 NOTE — PATIENT INSTRUCTIONS
Assessment & Plan     1. Hyperlipidemia LDL goal <100  Continue zocor  - Lipid Profile; Future  - Comprehensive metabolic panel; Future  - TSH with free T4 reflex; Future    2. Gastroesophageal reflux disease with esophagitis without hemorrhage  Continue pepcid     3. Generalized anxiety disorder  Stable     4. On statin therapy  - Comprehensive metabolic panel; Future      Review of prior external note(s) from - CareEverywhere information from vascular notes reviewed         BMI:   Estimated body mass index is 30.42 kg/m  as calculated from the following:    Height as of this encounter: 1.829 m (6').    Weight as of this encounter: 101.7 kg (224 lb 4.8 oz).         Return in about 6 months (around 4/24/2023) for lipids, anxiety.    Izzy Wagner NP  [facility]

## 2022-10-24 NOTE — NURSING NOTE
Chief Complaint   Patient presents with     Lipids     Anxiety       Initial /68 (BP Location: Left arm, Patient Position: Chair, Cuff Size: Adult Regular)   Pulse 72   Temp 97.6  F (36.4  C) (Tympanic)   Resp 18   Ht 1.829 m (6')   Wt 101.7 kg (224 lb 4.8 oz)   SpO2 98%   BMI 30.42 kg/m   Estimated body mass index is 30.42 kg/m  as calculated from the following:    Height as of this encounter: 1.829 m (6').    Weight as of this encounter: 101.7 kg (224 lb 4.8 oz).  Medication Reconciliation: complete  Pamela M. Lechevalier, LPN

## 2022-11-10 ENCOUNTER — TELEPHONE (OUTPATIENT)
Dept: FAMILY MEDICINE | Facility: OTHER | Age: 72
End: 2022-11-10

## 2022-11-10 ENCOUNTER — OFFICE VISIT (OUTPATIENT)
Dept: FAMILY MEDICINE | Facility: OTHER | Age: 72
End: 2022-11-10
Attending: NURSE PRACTITIONER
Payer: COMMERCIAL

## 2022-11-10 VITALS
SYSTOLIC BLOOD PRESSURE: 124 MMHG | RESPIRATION RATE: 16 BRPM | TEMPERATURE: 96.8 F | WEIGHT: 225.1 LBS | DIASTOLIC BLOOD PRESSURE: 60 MMHG | OXYGEN SATURATION: 98 % | BODY MASS INDEX: 30.53 KG/M2 | HEART RATE: 72 BPM

## 2022-11-10 DIAGNOSIS — R41.89 CHANGE IN LEVEL OF CONSCIOUSNESS: Primary | ICD-10-CM

## 2022-11-10 DIAGNOSIS — R73.09 ELEVATED GLUCOSE LEVEL: ICD-10-CM

## 2022-11-10 LAB
ALBUMIN UR-MCNC: NEGATIVE MG/DL
APPEARANCE UR: CLEAR
BILIRUB UR QL STRIP: NEGATIVE
COLOR UR AUTO: YELLOW
EST. AVERAGE GLUCOSE BLD GHB EST-MCNC: 108 MG/DL
GLUCOSE UR STRIP-MCNC: NEGATIVE MG/DL
HBA1C MFR BLD: 5.4 %
HGB UR QL STRIP: ABNORMAL
HOLD SPECIMEN: NORMAL
KETONES UR STRIP-MCNC: NEGATIVE MG/DL
LEUKOCYTE ESTERASE UR QL STRIP: NEGATIVE
NITRATE UR QL: NEGATIVE
PH UR STRIP: 6 [PH] (ref 5–7)
RBC #/AREA URNS AUTO: NORMAL /HPF
SP GR UR STRIP: 1.01 (ref 1–1.03)
UROBILINOGEN UR STRIP-ACNC: 0.2 E.U./DL
WBC #/AREA URNS AUTO: NORMAL /HPF

## 2022-11-10 PROCEDURE — G0463 HOSPITAL OUTPT CLINIC VISIT: HCPCS

## 2022-11-10 PROCEDURE — 99214 OFFICE O/P EST MOD 30 MIN: CPT | Performed by: NURSE PRACTITIONER

## 2022-11-10 PROCEDURE — 83036 HEMOGLOBIN GLYCOSYLATED A1C: CPT | Mod: ZL | Performed by: NURSE PRACTITIONER

## 2022-11-10 PROCEDURE — 81001 URINALYSIS AUTO W/SCOPE: CPT | Mod: ZL | Performed by: NURSE PRACTITIONER

## 2022-11-10 PROCEDURE — 36415 COLL VENOUS BLD VENIPUNCTURE: CPT | Mod: ZL | Performed by: NURSE PRACTITIONER

## 2022-11-10 ASSESSMENT — PAIN SCALES - GENERAL: PAINLEVEL: NO PAIN (0)

## 2022-11-10 NOTE — PATIENT INSTRUCTIONS
Assessment & Plan     1. Change in level of consciousness  [facility] ED notes reviewed.   - MR Brain w/o & w Contrast; Future  - Adult Neurology  Referral  - *UA reflex to Microscopic and Culture - [facility]; Future    2. Elevated glucose level  Elevated glucose 214 in the ED.   - Hemoglobin A1c; Future      Review of prior external note(s) from - CareEverywhere information from ED notes reviewed, CT scans and labs reviewed.  reviewed       MED REC REQUIRED  Post Medication Reconciliation Status: discharge medications reconciled, continue medications without change  BMI:   Estimated body mass index is 30.53 kg/m  as calculated from the following:    Height as of 10/24/22: 1.829 m (6').    Weight as of this encounter: 102.1 kg (225 lb 1.6 oz).     To ED with any further symptoms  Follow-up as scheduled or as needed     Izzy Wagner NP  [facility] - [facility]

## 2022-11-10 NOTE — PROGRESS NOTES
Assessment & Plan     1. Change in level of consciousness  [facility] ED notes reviewed.   - MR Brain w/o & w Contrast; Future  - Adult Neurology  Referral  - *UA reflex to Microscopic and Culture - [facility]; Future    2. Elevated glucose level  Elevated glucose 214 in the ED.   - Hemoglobin A1c; Future - normal, no diabetes      Review of prior external note(s) from - CareEverywhere information from ED notes reviewed, CT scans and labs reviewed.  reviewed       MED REC REQUIRED  Post Medication Reconciliation Status: discharge medications reconciled, continue medications without change  BMI:   Estimated body mass index is 30.53 kg/m  as calculated from the following:    Height as of 10/24/22: 1.829 m (6').    Weight as of this encounter: 102.1 kg (225 lb 1.6 oz).     To ED with any further symptoms  Follow-up as scheduled or as needed     Izzy Wagner, NP  [facility] - [place]UAN[place] Fernandez is a 72 year old, presenting for the following health issues:  ER F/U      HPI     ED/UC Followup:    Facility:  [facility]   Date of visit: 11/09/22  Reason for visit: Change in level of consciousness   Current Status: resolved    ED notes reviewed, MRI recommended.  He has not been referred to neurology.      Recent Labs   Lab Test 10/24/22  0848 08/04/22  1022 04/20/22  0858 11/03/21  1357 11/03/21  1357 04/29/21  1534 08/25/20  1634   LDL 73  --  80  --  70 86 87   HDL 58  --  60  --  58 66 53   TRIG 56  --  53  --  86 48 52   ALT 15 24 16   < > 20 18  --    CR 0.91 0.84 0.91   < > 0.94 0.82 0.76   GFRESTIMATED 90 >90 90   < > 81 89 >90   GFRESTBLACK  --   --   --   --   --  >90 >90   POTASSIUM 4.1 3.8 3.9   < > 3.8 4.1 3.9   TSH 1.78  --  1.51   < > 1.48 1.53 1.57    < > = values in this interval not displayed.      BP Readings from Last 3 Encounters:   11/10/22 124/60   10/24/22 116/68   08/24/22 126/74    Wt Readings from Last 3 Encounters:   11/10/22 102.1 kg (225 lb  1.6 oz)   10/24/22 101.7 kg (224 lb 4.8 oz)   08/24/22 100.2 kg (221 lb)                      Review of Systems   Constitutional, HEENT, cardiovascular, pulmonary, gi and gu systems are negative, except as otherwise noted.      Objective    /60 (BP Location: Left arm, Patient Position: Sitting, Cuff Size: Adult Regular)   Pulse 72   Temp 96.8  F (36  C) (Tympanic)   Resp 16   Wt 102.1 kg (225 lb 1.6 oz)   SpO2 98%   BMI 30.53 kg/m    Body mass index is 30.53 kg/m .  Physical Exam   GENERAL: healthy, alert and no distress  RESP: lungs clear to auscultation - no rales, rhonchi or wheezes  CV: regular rate and rhythm, normal S1 S2, no S3 or S4, no murmur, click or rub, no peripheral edema and peripheral pulses strong  MS: no gross musculoskeletal defects noted, no edema  NEURO: Normal strength and tone, mentation intact and speech normal  PSYCH: mentation appears normal, affect normal/bright        Results for orders placed or performed in visit on 11/10/22   Hemoglobin A1c     Status: None   Result Value Ref Range    Estimated Average Glucose 108 mg/dL    Hemoglobin A1C 5.4 <5.7 %   Extra Tube     Status: None    Narrative    The following orders were created for panel order Extra Tube.  Procedure                               Abnormality         Status                     ---------                               -----------         ------                     Extra Serum Separator Tu...[[id number]]                      Final result                 Please view results for these tests on the individual orders.   Extra Serum Separator Tube (SST)     Status: None   Result Value Ref Range    Hold Specimen [facility]    *UA reflex to Microscopic and Culture - [facility]     Status: Abnormal    Specimen: Urine, Midstream   Result Value Ref Range    Color Urine Yellow Colorless, Straw, Light Yellow, Yellow    Appearance Urine Clear Clear    Glucose Urine Negative Negative, 1000 , >=2000 mg/dL    Bilirubin Urine  Negative Negative    Ketones Urine Negative Negative, 160  mg/dL    Specific Gravity Urine 1.015 1.003 - 1.035    Blood Urine Trace (A) Negative    pH Urine 6.0 5.0 - 7.0    Protein Albumin Urine Negative Negative, 300 , >=2000 mg/dL    Urobilinogen Urine 0.2 0.2, 1.0 E.U./dL    Nitrite Urine Negative Negative    Leukocyte Esterase Urine Negative Negative   Urine Microscopic     Status: Normal   Result Value Ref Range    RBC Urine 0-2 0-2 /HPF /HPF    WBC Urine 0-5 0-5 /HPF /HPF    Narrative    Urine Culture not indicated             CT ANGIO HEAD NECK    HISTORY: Transient ischemic attack (TIA); Syncope, simple, abnormal neuro exam;     FINDINGS: Anterior cerebral and middle cerebral arteries are contrast opacified without flow limiting stenosis. Calcified change of the carotid siphons bilaterally. Left vertebral artery is dominant. Basilar artery and posterior cerebral arteries are contrast opacified without flow-limiting stenosis. Calcified change of the aortic arch. Great vessel origins are patent no dissection of the vertebral arteries. Calcified change of the carotid bulbs without flow-limiting stenosis. No flow limiting stenosis of the internal carotid arteries. The parapharyngeal soft tissues are symmetric. Imaging through the lung apices shows no acute finding.    IMPRESSION:  1. No flow limiting stenosis of the intracranial or extracranial circulation.    Dictated By: Jason Monteiro MD 11/9/2022 7:49 PM  Edited By: CATHY 11/9/2022 7:59 PM    CT HEAD WO IV CONTRAST    INDICATION: Mental status change, unknown cause;     TECHNIQUE: Routine CT of the brain was performed without contrast.    FINDINGS: Dense debris within the right sphenoid sinus is seen. Mastoid air cells are clear. Globes are symmetric. No intracranial hemorrhage, midline shift or hydrocephalus.    IMPRESSION:  1. No acute intracranial abnormality.  2. Chronic sphenoid sinusitis.    Dictated By: Jason Monteiro MD 11/9/2022 6:29 PM  Edited By: CATHY  11/9/2022 6:31 PM

## 2022-11-10 NOTE — TELEPHONE ENCOUNTER
Emergency Department and Urgent Care Follow-up      Reason for ER/UC visit: Confusion  o Date seen: 11/9/22      New or Worsening symptoms:  Better.   Still slight Headache.   Feels confusion has resolved.        Prescription Received/Picked up from Pharmacy?: None  o Medications started?   o Any questions or issues regarding your prescription?:       Follow-up Results or Labs that are pending: imaging in progress      Questions or concerns?: Recommended patient work with Provider to schedule an MRI      ER Recommends Follow-up by: as needed      RN Recommendations: f/u in office.  o Appointment scheduled: today 11/10/22  2:00  Provider Tonia    If you start feeling worse, or have any further questions, please feel free to contact Nurse Triage at [phone].  If needing immediate medical attention at any time please call 911/Go to the ER.

## 2022-11-10 NOTE — NURSING NOTE
Chief Complaint   Patient presents with     ER F/U       Initial /60 (BP Location: Left arm, Patient Position: Sitting, Cuff Size: Adult Regular)   Pulse 72   Temp 96.8  F (36  C) (Tympanic)   Resp 16   Wt 102.1 kg (225 lb 1.6 oz)   SpO2 98%   BMI 30.53 kg/m   Estimated body mass index is 30.53 kg/m  as calculated from the following:    Height as of 10/24/22: 1.829 m (6').    Weight as of this encounter: 102.1 kg (225 lb 1.6 oz).  Medication Reconciliation: complete  Mylene Brown LPN

## 2022-11-16 ENCOUNTER — MYC MEDICAL ADVICE (OUTPATIENT)
Dept: FAMILY MEDICINE | Facility: OTHER | Age: 72
End: 2022-11-16

## 2022-11-16 ENCOUNTER — HOSPITAL ENCOUNTER (OUTPATIENT)
Dept: MRI IMAGING | Facility: HOSPITAL | Age: 72
Discharge: HOME | End: 2022-11-16
Attending: NURSE PRACTITIONER | Admitting: NURSE PRACTITIONER
Payer: MEDICARE

## 2022-11-16 DIAGNOSIS — R41.89 CHANGE IN LEVEL OF CONSCIOUSNESS: ICD-10-CM

## 2022-11-16 PROCEDURE — 70553 MRI BRAIN STEM W/O & W/DYE: CPT | Mod: MG

## 2022-11-16 PROCEDURE — A9585 GADOBUTROL INJECTION: HCPCS | Performed by: RADIOLOGY

## 2022-11-16 PROCEDURE — 255N000002 HC RX 255 OP 636: Performed by: RADIOLOGY

## 2022-11-16 RX ORDER — GADOBUTROL 604.72 MG/ML
10 INJECTION INTRAVENOUS ONCE
Status: COMPLETED | OUTPATIENT
Start: 2022-11-16 | End: 2022-11-16

## 2022-11-16 RX ADMIN — GADOBUTROL 10 ML: 604.72 INJECTION INTRAVENOUS at 07:42

## 2022-11-17 ENCOUNTER — TELEPHONE (OUTPATIENT)
Dept: FAMILY MEDICINE | Facility: OTHER | Age: 72
End: 2022-11-17

## 2022-11-17 NOTE — TELEPHONE ENCOUNTER
12:34 PM    Reason for Call: Phone Call    Description: Patient's wife called in wondering if there were an appointment set or referral sent for an ENT appointment. Please call her back.    Was an appointment offered for this call? No  If yes : Appointment type              Date    Preferred method for responding to this message: Telephone Call  What is your phone number ? [phone]    If we cannot reach you directly, may we leave a detailed response at the number you provided? Yes    Can this message wait until your PCP/provider returns, if available today? Not applicable, provider in clinic    Soraida Newby

## 2022-11-18 DIAGNOSIS — J32.3 CHRONIC SPHENOIDAL SINUSITIS: Primary | ICD-10-CM

## 2022-11-25 ENCOUNTER — OFFICE VISIT (OUTPATIENT)
Dept: OTOLARYNGOLOGY | Facility: OTHER | Age: 72
End: 2022-11-25
Attending: NURSE PRACTITIONER
Payer: MEDICARE

## 2022-11-25 VITALS
BODY MASS INDEX: 29.85 KG/M2 | DIASTOLIC BLOOD PRESSURE: 65 MMHG | WEIGHT: 225.2 LBS | HEART RATE: 65 BPM | TEMPERATURE: 97.4 F | HEIGHT: 73 IN | SYSTOLIC BLOOD PRESSURE: 110 MMHG | OXYGEN SATURATION: 97 %

## 2022-11-25 DIAGNOSIS — J32.3 CHRONIC SPHENOIDAL SINUSITIS: ICD-10-CM

## 2022-11-25 DIAGNOSIS — R41.89 SPELL OF ALTERED COGNITION: Primary | ICD-10-CM

## 2022-11-25 PROCEDURE — 31231 NASAL ENDOSCOPY DX: CPT | Performed by: NURSE PRACTITIONER

## 2022-11-25 PROCEDURE — 99214 OFFICE O/P EST MOD 30 MIN: CPT | Mod: 25 | Performed by: NURSE PRACTITIONER

## 2022-11-25 PROCEDURE — G0463 HOSPITAL OUTPT CLINIC VISIT: HCPCS | Mod: 25

## 2022-11-25 ASSESSMENT — PAIN SCALES - GENERAL: PAINLEVEL: NO PAIN (0)

## 2022-11-25 NOTE — PATIENT INSTRUCTIONS
Thank you for allowing [facility] and our ENT team to participate in your care.  If your medications are too expensive, please give the nurse a call.  We can possibly change this medication.  If you have a scheduling or an appointment question please contact our Health Unit Coordinator at their direct line [phone].   ALL nursing questions or concerns can be directed to your ENT nurse at: [phone]      Complete CT sinus.  They will call you to schedule.    Follow up with Dr. Novak after the CT has been completed.

## 2022-11-25 NOTE — LETTER
"    11/25/2022         RE: Jim Brown  [address] [facility] [address]  Located within [facility] [address]        Dear Colleague,    Thank you for referring your patient, Jim Brown, to the [facility]. Please see a copy of my visit note below.    Otolaryngology Note         Chief Complaint:     Patient presents with:  Consult: Referred by Sami Randall for chronic sphenoidal sinusitis           History of Present Illness:     Jim Brown is a 72 year old male seen today for right sided sphenoid sinusitis.  He had MRI Brain for spells that explains as 3-4 minutes of losing track of what he is doing and does not remember what happened during the time.  He has had 2-3 episodes of this recently.      Tuesday before TG he was playing music - suddenly noticed he was no longer playing - after an uncertain time he came back to and noted that his fellow musicians were looking at him and he had dropped his guitar pick.  No loss of bowel or bladder and he maintained sitting up on his own.      He gets up every morning and walks for ~ 2 hours.  He reports he does not eat prior to exercising.  He reports he gets so hungry he gets sick to his stomach.  He has been encouraged to eat prior to exercising.  His wife read an article about a person who had similar spells due to exercising on an empty stomach.      He saw Neurology and is scheduled for EEG on 12/2/22 at [facility] in [place].      He presents today with his wife Maura    No concerns for recurrent sinusitis.  He reports he typically does not go to the doctor.  He reports he doesn't typically \"whine\" about stuff either.    He denies frequent headaches  He does report he feels \"tightness\" behind the right eye  No vision changes  He is able to breath through his nose with congestion   He reports long term hyposmia.    He is able to taste.    No recent fevers or chills.    Appetite is normal.     He denies recent fatigue  + post nasal drainage that causes cough at " times  He does snore loudly with some apneic periods per his wife.   No shortness of breath      + history of septoplasty due to DNS in the distant past.    + tonsillectomy as a child  No history of COM, otological surgery  He feels that his hearing is alright  He does have a history of noise exposure without HP (jack hammers, etc)  He does have a history of Sudden hearing loss bilaterally after very loud noise exposure many years ago, he did get hearing back it was decreased from baseline.  He reports he had previous hearing test and was recommended to wear hearing aids but he declines.  No hearing test on file.       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 No focal lesion or structural abnormality to explain patient's  symptoms.  2. Unchanged complete opacification of the right sphenoid sinus,  suspected secondary to chronic obstruction from mucous retention cyst  or mucosal polyp.           Medications:     Current Outpatient Rx   Medication Sig Dispense Refill     aspirin (ASA) 325 MG EC tablet Take 325 mg by mouth daily       ciclopirox (PENLAC) 8 % external solution Apply to adjacent skin and affected nails daily.  Remove with alcohol every 7 days, then repeat. 6.6 mL 1     famotidine (PEPCID) 20 MG tablet TAKE 1 TABLET (20 MG) BY MOUTH 2 TIMES DAILY AS NEEDED (HEARTBURN) 90 tablet 2     LORazepam (ATIVAN) 0.5 MG tablet Take one tablet as needed for anxiety with flying. 4 tablet 0     Multiple Vitamin (MULTI-VITAMINS) TABS        Probiotic Product (PROBIOTIC-10 PO) Take by mouth daily       sildenafil  "(VIAGRA) 100 MG tablet TAKE 1 TABLET (100 MG) BY MOUTH DAILY AS NEEDED (TAKE 30 MIN TO 4 HOURS AS NEEDED PRIOR TO INTERCOURSE.) 12 tablet 5     simvastatin (ZOCOR) 40 MG tablet TAKE 1 TABLET (40 MG) BY MOUTH AT BEDTIME 90 tablet 3            Allergies:     Allergies: Patient has no known allergies.          Past Medical History:     Past Medical History:   Diagnosis Date     Arthritis      Depressive disorder             Past Surgical History:     Past Surgical History:   Procedure Laterality Date     ABDOMEN SURGERY      hernia surgerys 3-4     CHOLECYSTECTOMY  2016    galbladder      COLONOSCOPY  2018    [facility], negative for polyps 10 year      ENT SURGERY      tonsilectomy when little        ENT family history reviewed         Social History:     Social History     Tobacco Use     Smoking status: Former     Packs/day: 1.00     Years: 20.00     Pack years: 20.00     Types: Cigarettes     Quit date: 1971     Years since quittin.0     Smokeless tobacco: Former     Types: Chew   Substance Use Topics     Alcohol use: No     Drug use: No            Review of Systems:     ROS: See HPI         Physical Exam:     /65 (BP Location: Left arm, Patient Position: Sitting, Cuff Size: Adult Regular)   Pulse 65   Temp 97.4  F (36.3  C) (Tympanic)   Ht 1.854 m (6' 1\")   Wt 102.2 kg (225 lb 3.2 oz)   SpO2 97%   BMI 29.71 kg/m      General - The patient is well nourished and well developed, and appears to have good nutritional status.  Alert and oriented to person and place, answers questions and cooperates with examination appropriately.   Head and Face - Normocephalic and atraumatic, with no gross asymmetry noted.  The facial nerve is intact, with strong symmetric movements.  Voice and Breathing - The patient was breathing comfortably without the use of accessory muscles. There was no wheezing, stridor, or stertor.  The patients voice was clear and strong, and had appropriate pitch and " quality.  Ears -The external auditory canals are patent, the tympanic membranes are intact without effusion, retraction or mass.  Bony landmarks are intact.  Eyes - Extraocular movements intact, sclera were not icteric or injected, conjunctiva were pink and moist.  Mouth - Examination of the oral cavity showed pink, healthy oral mucosa. No lesions or ulcerations noted.  The tongue was mobile and midline, and the dentition were in good repair.    Throat - The walls of the oropharynx were smooth, pink, moist, symmetric, and had no lesions or ulcerations.  The tonsillar pillars and soft palate were symmetric.  The uvula was midline on elevation.   Neck - no worrisome lymphadenopathy.   Palpation of the thyroid was soft and smooth, with no nodules or goiter appreciated.  The trachea was mobile and midline.  Nose - External contour is symmetric, no gross deflection or scars.  The nasal passages are examined with nasal speculum.   Nasal mucosa is pink and moist with no abnormal mucus.  The septum was intact and the turbinates are examined with nasal speculum, no polyps, masses, or purulence noted on examination of anterior nasal cavity.     To evaluate the nose and sinuses, I performed rigid nasal endoscopy.  I sprayed both nares with 2 sprays lidocaine and neosynephrine.     I began with the RIGHT side using a 0 degree rigid nasal endoscope, and then similarly examined the LEFT side     Findings:  Septum is grossly midline and intact  Inferior turbinates:  normal  Middle turbinate and middle meatus:  normal  Superior meatus is examined and unremarkable  No Sphenoethmoidal purulence  Mucosa is healthy,  no polyps nor polypoid degeneration  Nasopharynx clear, ET patent, no edema  The patient tolerated the procedure well            Assessment and Plan:       ICD-10-CM    1. Spell of altered cognition  R41.89       2. Chronic sphenoidal sinusitis  J32.3 Adult ENT  Referral     CT Sinus w/o Contrast        Complete  CT sinus  Keep scheduled follow up for EEG and follow up with Neurology  Follow up with Dr Novak for consultation after CT completed    Edith SUÁREZ  [facility] ENT          Again, thank you for allowing me to participate in the care of your patient.        Sincerely,        Edith Martinez NP

## 2022-11-25 NOTE — PROGRESS NOTES
"Otolaryngology Note         Chief Complaint:     Patient presents with:  Consult: Referred by Sami Randall for chronic sphenoidal sinusitis           History of Present Illness:     Jim Brown is a 72 year old male seen today for right sided sphenoid sinusitis.  He had MRI Brain for spells that explains as 3-4 minutes of losing track of what he is doing and does not remember what happened during the time.  He has had 2-3 episodes of this recently.      Tuesday before TG he was playing music - suddenly noticed he was no longer playing - after an uncertain time he came back to and noted that his fellow musicians were looking at him and he had dropped his guitar pick.  No loss of bowel or bladder and he maintained sitting up on his own.      He gets up every morning and walks for ~ 2 hours.  He reports he does not eat prior to exercising.  He reports he gets so hungry he gets sick to his stomach.  He has been encouraged to eat prior to exercising.  His wife read an article about a person who had similar spells due to exercising on an empty stomach.      He saw Neurology and is scheduled for EEG on 12/2/22 at [facility] in [place].      He presents today with his wife Maura    No concerns for recurrent sinusitis.  He reports he typically does not go to the doctor.  He reports he doesn't typically \"whine\" about stuff either.    He denies frequent headaches  He does report he feels \"tightness\" behind the right eye  No vision changes  He is able to breath through his nose with congestion   He reports long term hyposmia.    He is able to taste.    No recent fevers or chills.    Appetite is normal.     He denies recent fatigue  + post nasal drainage that causes cough at times  He does snore loudly with some apneic periods per his wife.   No shortness of breath      + history of septoplasty due to DNS in the distant past.    + tonsillectomy as a child  No history of COM, otological surgery  He feels that his hearing is " alright  He does have a history of noise exposure without HP (jack hammers, etc)  He does have a history of Sudden hearing loss bilaterally after very loud noise exposure many years ago, he did get hearing back it was decreased from baseline.  He reports he had previous hearing test and was recommended to wear hearing aids but he declines.  No hearing test on file.       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 No focal lesion or structural abnormality to explain patient's  symptoms.  2. Unchanged complete opacification of the right sphenoid sinus,  suspected secondary to chronic obstruction from mucous retention cyst  or mucosal polyp.           Medications:     Current Outpatient Rx   Medication Sig Dispense Refill     aspirin (ASA) 325 MG EC tablet Take 325 mg by mouth daily       ciclopirox (PENLAC) 8 % external solution Apply to adjacent skin and affected nails daily.  Remove with alcohol every 7 days, then repeat. 6.6 mL 1     famotidine (PEPCID) 20 MG tablet TAKE 1 TABLET (20 MG) BY MOUTH 2 TIMES DAILY AS NEEDED (HEARTBURN) 90 tablet 2     LORazepam (ATIVAN) 0.5 MG tablet Take one tablet as needed for anxiety with flying. 4 tablet 0     Multiple Vitamin (MULTI-VITAMINS) TABS        Probiotic Product (PROBIOTIC-10 PO) Take by mouth daily       sildenafil (VIAGRA) 100 MG tablet TAKE 1 TABLET (100 MG) BY MOUTH DAILY AS NEEDED (TAKE 30 MIN TO 4 HOURS AS NEEDED PRIOR TO INTERCOURSE.) 12 tablet 5     simvastatin (ZOCOR) 40 MG tablet TAKE 1 TABLET (40 MG) BY MOUTH AT BEDTIME 90 tablet 3            Allergies:  "    Allergies: Patient has no known allergies.          Past Medical History:     Past Medical History:   Diagnosis Date     Arthritis      Depressive disorder             Past Surgical History:     Past Surgical History:   Procedure Laterality Date     ABDOMEN SURGERY      hernia surgerys 3-4     CHOLECYSTECTOMY  2016    galbladder      COLONOSCOPY  2018    [facility], negative for polyps 10 year      ENT SURGERY      tonsilectomy when little        ENT family history reviewed         Social History:     Social History     Tobacco Use     Smoking status: Former     Packs/day: 1.00     Years: 20.00     Pack years: 20.00     Types: Cigarettes     Quit date: 1971     Years since quittin.0     Smokeless tobacco: Former     Types: Chew   Substance Use Topics     Alcohol use: No     Drug use: No            Review of Systems:     ROS: See HPI         Physical Exam:     /65 (BP Location: Left arm, Patient Position: Sitting, Cuff Size: Adult Regular)   Pulse 65   Temp 97.4  F (36.3  C) (Tympanic)   Ht 1.854 m (6' 1\")   Wt 102.2 kg (225 lb 3.2 oz)   SpO2 97%   BMI 29.71 kg/m      General - The patient is well nourished and well developed, and appears to have good nutritional status.  Alert and oriented to person and place, answers questions and cooperates with examination appropriately.   Head and Face - Normocephalic and atraumatic, with no gross asymmetry noted.  The facial nerve is intact, with strong symmetric movements.  Voice and Breathing - The patient was breathing comfortably without the use of accessory muscles. There was no wheezing, stridor, or stertor.  The patients voice was clear and strong, and had appropriate pitch and quality.  Ears -The external auditory canals are patent, the tympanic membranes are intact without effusion, retraction or mass.  Bony landmarks are intact.  Eyes - Extraocular movements intact, sclera were not icteric or injected, conjunctiva were pink and " moist.  Mouth - Examination of the oral cavity showed pink, healthy oral mucosa. No lesions or ulcerations noted.  The tongue was mobile and midline, and the dentition were in good repair.    Throat - The walls of the oropharynx were smooth, pink, moist, symmetric, and had no lesions or ulcerations.  The tonsillar pillars and soft palate were symmetric.  The uvula was midline on elevation.   Neck - no worrisome lymphadenopathy.   Palpation of the thyroid was soft and smooth, with no nodules or goiter appreciated.  The trachea was mobile and midline.  Nose - External contour is symmetric, no gross deflection or scars.  The nasal passages are examined with nasal speculum.   Nasal mucosa is pink and moist with no abnormal mucus.  The septum was intact and the turbinates are examined with nasal speculum, no polyps, masses, or purulence noted on examination of anterior nasal cavity.     To evaluate the nose and sinuses, I performed rigid nasal endoscopy.  I sprayed both nares with 2 sprays lidocaine and neosynephrine.     I began with the RIGHT side using a 0 degree rigid nasal endoscope, and then similarly examined the LEFT side     Findings:  Septum is grossly midline and intact  Inferior turbinates:  normal  Middle turbinate and middle meatus:  normal  Superior meatus is examined and unremarkable  No Sphenoethmoidal purulence  Mucosa is healthy,  no polyps nor polypoid degeneration  Nasopharynx clear, ET patent, no edema  The patient tolerated the procedure well            Assessment and Plan:       ICD-10-CM    1. Spell of altered cognition  R41.89       2. Chronic sphenoidal sinusitis  J32.3 Adult ENT  Referral     CT Sinus w/o Contrast        Complete CT sinus  Keep scheduled follow up for EEG and follow up with Neurology  Follow up with Dr Novak for consultation after CT completed    Edith Martinez NPLonaC  [facility] ENT

## 2022-12-05 ENCOUNTER — HOSPITAL ENCOUNTER (OUTPATIENT)
Dept: CT IMAGING | Facility: HOSPITAL | Age: 72
Discharge: HOME | End: 2022-12-05
Attending: NURSE PRACTITIONER | Admitting: NURSE PRACTITIONER
Payer: MEDICARE

## 2022-12-05 DIAGNOSIS — J32.3 CHRONIC SPHENOIDAL SINUSITIS: ICD-10-CM

## 2022-12-05 PROCEDURE — G1010 CDSM STANSON: HCPCS

## 2022-12-12 ENCOUNTER — MYC MEDICAL ADVICE (OUTPATIENT)
Dept: FAMILY MEDICINE | Facility: OTHER | Age: 72
End: 2022-12-12

## 2023-01-06 ENCOUNTER — OFFICE VISIT (OUTPATIENT)
Dept: OTOLARYNGOLOGY | Facility: OTHER | Age: 73
End: 2023-01-06
Attending: OTOLARYNGOLOGY
Payer: MEDICARE

## 2023-01-06 ENCOUNTER — PREP FOR PROCEDURE (OUTPATIENT)
Dept: OTOLARYNGOLOGY | Facility: OTHER | Age: 73
End: 2023-01-06

## 2023-01-06 VITALS
OXYGEN SATURATION: 97 % | HEART RATE: 67 BPM | TEMPERATURE: 97.6 F | DIASTOLIC BLOOD PRESSURE: 64 MMHG | HEIGHT: 73 IN | SYSTOLIC BLOOD PRESSURE: 116 MMHG | BODY MASS INDEX: 28.49 KG/M2 | WEIGHT: 215 LBS

## 2023-01-06 DIAGNOSIS — J34.3 NASAL TURBINATE HYPERTROPHY: ICD-10-CM

## 2023-01-06 DIAGNOSIS — J34.2 DEVIATED NASAL SEPTUM: ICD-10-CM

## 2023-01-06 DIAGNOSIS — J34.2 DNS (DEVIATED NASAL SEPTUM): ICD-10-CM

## 2023-01-06 DIAGNOSIS — J34.89 MASS OF RIGHT SPHENOID SINUS: ICD-10-CM

## 2023-01-06 DIAGNOSIS — Z98.890 HISTORY OF NASAL SURGERY: ICD-10-CM

## 2023-01-06 DIAGNOSIS — J32.4 CHRONIC PANSINUSITIS: Primary | ICD-10-CM

## 2023-01-06 PROCEDURE — 31231 NASAL ENDOSCOPY DX: CPT | Performed by: OTOLARYNGOLOGY

## 2023-01-06 PROCEDURE — 99214 OFFICE O/P EST MOD 30 MIN: CPT | Mod: 25 | Performed by: OTOLARYNGOLOGY

## 2023-01-06 PROCEDURE — G0463 HOSPITAL OUTPT CLINIC VISIT: HCPCS | Mod: 25

## 2023-01-06 RX ORDER — PREDNISONE 10 MG/1
TABLET ORAL
Qty: 6 TABLET | Refills: 1 | Status: SHIPPED | OUTPATIENT
Start: 2023-01-06 | End: 2023-01-25

## 2023-01-06 RX ORDER — BUDESONIDE 0.5 MG/2ML
INHALANT ORAL
Qty: 200 ML | Refills: 11 | Status: SHIPPED | OUTPATIENT
Start: 2023-01-06 | End: 2024-01-17

## 2023-01-06 ASSESSMENT — PAIN SCALES - GENERAL: PAINLEVEL: NO PAIN (0)

## 2023-01-06 NOTE — PROGRESS NOTES
Otolaryngology Progress Note          Jim Brown is a 72 year old male who presents for follow-up for chronic sphenoid sinusitis  He was seen by Edith on 11/25/2022    He has had years of right worse than left nasal obstruction  He notes frequent postnasal drainage    SNOT 19 with a moderate problem with nasal blockage, rhinorrhea cough postnasal drainage and thick nasal discharge  No vertex headaches or other headaches  No change in vision    Distant history of a septoplasty by an outside provider in [place] 20 years ago     Opacification of right sphenoid sinus was found incidentally on MRI of the brain which was ordered for multiple spells of amnesia.  He has seen neurology at [facility] on 12/2/2022.       He has been having rare amnesia episodes over the past year and is followed by neurology.  EEG on 12/2/22 [facility]. He saw Dr. Muse     Interpretation:  This abnormal routine EEG shows evidence of cortical dysfunction and cortical irritability in the left frontotemporal region. This indicates an elevated risk for seizures. No seizures or clinical spells were captured. Clinical correlation is advised.    Abelardo Muse MD    Anticonvulsants recommended but was hesitant to start due to side effects     CT sinus dated 12/5/2022 shows complete dense opacification of the right sphenoid sinus there is no bony erosion.  There is chronic bone thickening the optic nerve is not dehiscent the skull base is intact, flat keros 1  There is minimal mucoperiosteal thickening of the frontal sinuses, there is mucoperiosteal thickening of the anterior and posterior ethmoid sinuses.  The ostiomeatal complexes are narrowed.  The maxillary sinuses are intact with a hypoplastic left maxillary sinus no obvious change in globe position on imaging mid septal deviation with spur to the right bilateral inferior turbinate and middle turbinate hypertrophy with dense lynn bullosa     MRI brain dated 11/16/2022 was negative  "aside from right sphenoid sinus opacification    Accompanied by his wife, Maura    He is very active, snowshoes for at least an hour every day  He relays difficulty awakening after anesthesia after prior herniorrhaphy and distant septoplasty which was 20 years ago. Surgeries were in [place].       12 point review of systems negative aside from that mentioned in the history of present illness    Physical Exam  /64 (Cuff Size: Adult Regular)   Pulse 67   Temp 97.6  F (36.4  C) (Tympanic)   Ht 1.854 m (6' 1\")   Wt 97.5 kg (215 lb)   SpO2 97%   BMI 28.37 kg/m    General - The patient is well nourished and well developed, and appears to have good nutritional status.  Alert and oriented to person and place, interactive.  Head and Face - Normocephalic and atraumatic, with no gross asymmetry noted of the contour of the facial features.  The facial nerve is intact, with strong symmetric movements.  Neck-no palpable lymphadenopathy or thyroid mass.  Trachea is midline.  Eyes - Extraocular movements intact.   Ears- External auditory canals are patent, tympanic membranes are intact without effusion or worrisome retractions   Nose - Nasal mucosa is pink and moist with no abnormal mucus.  No polyps, masses, or purulence noted on examination.  Mouth - Examination of the oral cavity shows pink, healthy, moist mucosa.  No lesions or ulceration noted.  The dentition are in good repair.  The tongue is mobile and midline.  Throat - The walls of the oropharynx were smooth, pink, moist, symmetric, and had no lesions or ulcerations.  The tonsillar pillars and soft palate were symmetric.  The uvula was midline on elevation.  Fossa clear      To evaluate the nose and sinuses, I performed rigid nasal endoscopy.  I applied topical nasal lidocaine and neosynephrine.    I began with the LEFT side using a 0 degree rigid nasal endoscope, and then similarly examined the RIGHT side    Findings:  Inferior turbinates:  3+ " bilaterally  MTH bilaterally  DNS right which narrows access to the lateral nasal wall and sphenoethmoidal recess  Middle turbinate and middle meatus:  No purulence, no polyposis, edematous right uncinate  Superior meatus was evaluated  Mucosa is edematous throughout,  No herber polyps nor polypoid degeneration  Sphenoethmoidal recess without purulence   Nasopharynx clear, ET patent no mass or purulence  The patient tolerated the procedure well      Impression/Plan  Jim Brown is a 72 year old male    ICD-10-CM    1. Chronic pansinusitis  J32.4 budesonide (PULMICORT) 0.5 MG/2ML neb solution     predniSONE (DELTASONE) 10 MG tablet      2. Mass of right sphenoid sinus  J34.89       3. DNS (deviated nasal septum)  J34.2       4. History of nasal surgery  Z98.890       5. Nasal turbinate hypertrophy  J34.3           Start budesonide irrigations    Options of surveillance with medical management versus surgery discussed he would like to proceed with surgery.    Anesthesia consult appreciated     The risks and complications of RIGHT Endoscopic Sinus Surgery (ESS), septoplasty, bilateral turbinate reduction were openly discussed.  The potential risks include bleeding, general anesthesia, infection, scar formation, need for additional surgery, septal perforation, and rarely injury to the eye with the possibility of blindness,  injury to the brain, meningitis or other intracranial complications.  Nonsurgical options were discussed including prolonged antibioitics and/or oral and topical steroids.   Sinus anatomy and sinus disease were reviewed.  CT sinus was reviewed with the patient.  All questions were answered.      right total ethmoid  Right sphenoid  Lukey/trap  Alexander   No balloons  Rep requested     Risks of oral steroid use were discussed and include psychiatric/mood changes, insomnia, stomach ulcers and potential GI bleeding, blood sugar elevation/worsening diabetes, hip/bone necrosis called avascular necrosis, or  bone demineralization.        Continue all current medications recommended by me or my staff.     Continue budesonide irrigations.  Verbal and written education on use provided.     The importance of budesonide irrigations postoperatively was reinforced.        The correct use of nasal irrigations as prescribed will prevent synechiae and allow for proper recovery of the sinus mucosa.         Total exam time was over 40 minutes with over 25 minutes of this time spent in direct patient education, counseling and coordination of care.  We discussed the importance of high flow nasal saline use to prevent nasal secretion stasis, the correct use of nasal steroids, and nasal and sinus anatomy were reviewed.         Lexy Novak D.O.  Otolaryngology/Head and Neck Surgery  Allergy

## 2023-01-06 NOTE — PATIENT INSTRUCTIONS
Thank you for allowing Dr. Novak and our ENT team to participate in your care.  If your medications are too expensive, please give the nurse a call.  We can possibly change this medication.  If you have a scheduling or an appointment question please contact our Health Unit Coordinator at their direct line [phone].   ALL nursing questions or concerns can be directed to your ENT nurse at: [phone] Lona Nation    Use Budesonide Rinses Twice Daily    Start Prednisone 2 days before surgery    Follow up in surgery      Budesonide instructions  Make the Robb Med Saline Solution using 2 packages of salt and previously boiled or distilled water.  This will make 240 ml of saline solution.  Mix the entire vial of budesonide into the solution.   Irrigate your nose 2 times a day with the warm budesonide solution using 1 bottle between nostrils in the morning, and one bottle at night.       Instructions for Sinus Surgery    Recovery - Everyone recovers differently from a general anesthetic.  Symptoms such as fatigue, nausea, light-headedness, and sometimes a low grade fever (up to 100 degrees) are not unusual.  As your body removes the anesthetic drugs from circulation, these symptoms will resolve.  Your nose will be sore after surgery, and you may even have symptoms similar to a sinus infection with headache, congestion, and pressure.  These will resolve with healing.  For several days you may experience bloody drainage from the nose, please use the drip pad as necessary for this.  If there is persistent bleeding, please call the office during business hours or the on call ENT physician after hours.  There are no diet restrictions after sinus surgery, and you can resume your home medications.      Please do not blow your nose until 2 weeks after surgery.       Limit your activity to no strenuous activities until I see you for the first follow-up visit in approximately 2 weeks.      Medications - You were sent home  with narcotic pain medication.  If you can tolerate the discomfort during your recovery by using just plain Tylenol or ibuprofen (advil), please do so.  However, do not hesitate to use the stronger pain medication if needed.  If you were sent home with an antibiotic, it is primarily used to help the healing process.  If it causes loose bowel movements or other signs of intolerance, it is appropriate to discontinue it.  By far the most important measure you can take to speed recovery, and maximize the chances of long term success of sinus surgery is using the sinus rinses at least three time per day for the first month after surgery.       Start budesonide irrigations tomorrow.  Use 2 times daily for one month and then as directed.  Start Jermaine Med saline irrigation tonight and use at least 3 times daily.   Continue twice daily budesonide irrigations and 2-3 times daily NeilMed saline irrigations for 1 month and then as directed by Dr. Novak    Budesonide instructions  Make the saline solution using 2 packages of salt and previously boiled or distilled water.  This will make 240 ml of saline solution.  Mix the entire vial of budesonide into the solution.   Irrigate your nose 2 times a day with the warm budesonide solution using 1 bottle between nostrils in the morning, and one bottle at night.   Use plain jermaine med saline without the steroid 2-3 times during the afternoon    Perform gentle irrigation for the first week.  Starting 1 week after surgery, you can start to irrigate a bit more forcefully.  The more often you irrigate with the jermaine med saline, the faster you will heal.      At 3 weeks after surgery you may restart nasal steroids if needed (flonase, nasonex, etc).      Complications - Problems related to sinus surgery almost always are detected during the operation, and special instruction will be given in that situation.  However, unexpected things can happen, and are all related to the structures  around the sinus cavities.  Symptoms that should alert you to a possible problem include: severe eye pain or eye swelling, persistent heavy bleeding from the nose, and high fevers with headache and neck pain.  Any of these symptoms should be called into my office or to the on call ENT if after hours.  The most common non-emergency complication of sinus surgery is the formation of scar tissue which can re-block the sinuses.  This is addressed below.    Follow-up -  As you have noted, there are quite a few follow-up visits after sinus surgery.  This is done to aggressively manage the most common complication of this technique, which is scar tissue blocking the sinuses.  These visits will require the examination of your nose and possibly removal of crusts of dry mucous and blood, with possible removal of early scar tissue.  Please prepare for these visits by using your sinus rinses.    If there are any questions or issues with the above, or if there are other issues that concern you, always feel free to call the clinic and I am happy to speak with you as soon as I can.    Lexy Novak D.O.  Otolaryngology/Head and Neck Surgery  Allergy    [phone]   extension [phone]          HOW TO PREPARE-    You need to have a scheduled Pre-Op with your primary care physician within 30 days of your scheduled surgery.      You need a friend or family member available to drive you home AND stay with you for 24 hours after you leave the hospital. You will not be allowed to drive yourself. IF you need to take a taxi or the bus you MUST have a responsible person to ride with you. YOUR PROCEDURE WILL BE CANCELLED IF YOU DO NOT HAVE A RESPONSIBLE ADULT TO DRIVE YOU HOME.     You CANNOT have anything to eat or drink after midnight the night before your surgery, including water and coffee. Your stomach needs to be completely empty. Do NOT chew gum, suck on hard candy, or smoke. You can brush your teeth the morning of surgery.      The Surgery Education Nurses will call you  1-2 weeks prior to your surgery date at  [phone] or toll free [phone]. Please have your medication and allergy lists ready.    Stop your aspirin or other NSAIDs(Ibuprofen, Motrin, Aleve, Celebrex, Naproxen, etc...) 7 days before your surgery.    Hospital admitting will call you the day before your surgery with your exact arrival time.     Please call your primary care physician if you should become ill within 24 hours of scheduled surgery. (ex.vomiting, diarrhea, fever)        You will need to wash the night before AND the morning of you procedure with a liquid antibacterial soap, like Dial.

## 2023-01-06 NOTE — LETTER
1/6/2023         RE: Jmi Brown  [address]   [address]  [facility] [address]        Dear Colleague,    Thank you for referring your patient, Jim Brown, to the [facility]. Please see a copy of my visit note below.     Otolaryngology Progress Note          Jim Brown is a 72 year old male who presents for follow-up for chronic sphenoid sinusitis  He was seen by Edith on 11/25/2022    He has had years of right worse than left nasal obstruction  He notes frequent postnasal drainage    SNOT 19 with a moderate problem with nasal blockage, rhinorrhea cough postnasal drainage and thick nasal discharge  No vertex headaches or other headaches  No change in vision    Distant history of a septoplasty by an outside provider in [place] 20 years ago     Opacification of right sphenoid sinus was found incidentally on MRI of the brain which was ordered for multiple spells of amnesia.  He has seen neurology at [facility] on 12/[facility]/2022.       He has been having rare amnesia episodes over the past year and is followed by neurology.  EEG on 12/2/22 [facility]. He saw Dr. Muse     Interpretation:  This abnormal routine EEG shows evidence of cortical dysfunction and cortical irritability in the left frontotemporal region. This indicates an elevated risk for seizures. No seizures or clinical spells were captured. Clinical correlation is advised.    Abelardo Muse MD    Anticonvulsants recommended but was hesitant to start due to side effects     CT sinus dated 12/5/2022 shows complete dense opacification of the right sphenoid sinus there is no bony erosion.  There is chronic bone thickening the optic nerve is not dehiscent the skull base is intact, flat keros 1  There is minimal mucoperiosteal thickening of the frontal sinuses, there is mucoperiosteal thickening of the anterior and posterior ethmoid sinuses.  The ostiomeatal complexes are narrowed.  The maxillary sinuses are intact with a  "hypoplastic left maxillary sinus no obvious change in globe position on imaging mid septal deviation with spur to the right bilateral inferior turbinate and middle turbinate hypertrophy with dense lynn bullosa     MRI brain dated 11/16/2022 was negative aside from right sphenoid sinus opacification    Accompanied by his wife, Maura    He is very active, snowshoes for at least an hour every day  He relays difficulty awakening after anesthesia after prior herniorrhaphy and distant septoplasty which was 20 years ago. Surgeries were in [place].       12 point review of systems negative aside from that mentioned in the history of present illness    Physical Exam  /64 (Cuff Size: Adult Regular)   Pulse 67   Temp 97.6  F (36.4  C) (Tympanic)   Ht 1.854 m (6' 1\")   Wt 97.5 kg (215 lb)   SpO2 97%   BMI 28.37 kg/m    General - The patient is well nourished and well developed, and appears to have good nutritional status.  Alert and oriented to person and place, interactive.  Head and Face - Normocephalic and atraumatic, with no gross asymmetry noted of the contour of the facial features.  The facial nerve is intact, with strong symmetric movements.  Neck-no palpable lymphadenopathy or thyroid mass.  Trachea is midline.  Eyes - Extraocular movements intact.   Ears- External auditory canals are patent, tympanic membranes are intact without effusion or worrisome retractions   Nose - Nasal mucosa is pink and moist with no abnormal mucus.  No polyps, masses, or purulence noted on examination.  Mouth - Examination of the oral cavity shows pink, healthy, moist mucosa.  No lesions or ulceration noted.  The dentition are in good repair.  The tongue is mobile and midline.  Throat - The walls of the oropharynx were smooth, pink, moist, symmetric, and had no lesions or ulcerations.  The tonsillar pillars and soft palate were symmetric.  The uvula was midline on elevation.  Fossa clear      To evaluate the nose and sinuses, " I performed rigid nasal endoscopy.  I applied topical nasal lidocaine and neosynephrine.    I began with the LEFT side using a 0 degree rigid nasal endoscope, and then similarly examined the RIGHT side    Findings:  Inferior turbinates:  3+ bilaterally  MTH bilaterally  DNS right which narrows access to the lateral nasal wall and sphenoethmoidal recess  Middle turbinate and middle meatus:  No purulence, no polyposis, edematous right uncinate  Superior meatus was evaluated  Mucosa is edematous throughout,  No herber polyps nor polypoid degeneration  Sphenoethmoidal recess without purulence   Nasopharynx clear, ET patent no mass or purulence  The patient tolerated the procedure well      Impression/Plan  Jim Brown is a 72 year old male    ICD-10-CM    1. Chronic pansinusitis  J32.4 budesonide (PULMICORT) 0.5 MG/2ML neb solution     predniSONE (DELTASONE) 10 MG tablet      2. Mass of right sphenoid sinus  J34.89       3. DNS (deviated nasal septum)  J34.2       4. History of nasal surgery  Z98.890       5. Nasal turbinate hypertrophy  J34.3           Start budesonide irrigations    Options of surveillance with medical management versus surgery discussed he would like to proceed with surgery.    Anesthesia consult appreciated     The risks and complications of RIGHT Endoscopic Sinus Surgery (ESS), septoplasty, bilateral turbinate reduction were openly discussed.  The potential risks include bleeding, general anesthesia, infection, scar formation, need for additional surgery, septal perforation, and rarely injury to the eye with the possibility of blindness,  injury to the brain, meningitis or other intracranial complications.  Nonsurgical options were discussed including prolonged antibioitics and/or oral and topical steroids.   Sinus anatomy and sinus disease were reviewed.  CT sinus was reviewed with the patient.  All questions were answered.      right total ethmoid  Right sphenoid  Lukey/trap  Alexander   No  balloons  Rep requested     Risks of oral steroid use were discussed and include psychiatric/mood changes, insomnia, stomach ulcers and potential GI bleeding, blood sugar elevation/worsening diabetes, hip/bone necrosis called avascular necrosis, or bone demineralization.        Continue all current medications recommended by me or my staff.     Continue budesonide irrigations.  Verbal and written education on use provided.     The importance of budesonide irrigations postoperatively was reinforced.        The correct use of nasal irrigations as prescribed will prevent synechiae and allow for proper recovery of the sinus mucosa.         Total exam time was over 40 minutes with over 25 minutes of this time spent in direct patient education, counseling and coordination of care.  We discussed the importance of high flow nasal saline use to prevent nasal secretion stasis, the correct use of nasal steroids, and nasal and sinus anatomy were reviewed.         Lexy Novak D.O.  Otolaryngology/Head and Neck Surgery  Allergy                 Again, thank you for allowing me to participate in the care of your patient.        Sincerely,        Lexy Novak MD

## 2023-01-23 ENCOUNTER — ANESTHESIA EVENT (OUTPATIENT)
Dept: SURGERY | Facility: HOSPITAL | Age: 73
End: 2023-01-23
Payer: MEDICARE

## 2023-01-23 ASSESSMENT — LIFESTYLE VARIABLES: TOBACCO_USE: 1

## 2023-01-23 NOTE — H&P (VIEW-ONLY)
[facility]  [address]  [facility] [address]  Phone: [phone]  Primary Provider: Izzy Do  Pre-op Performing Provider: IZZY DO      PREOPERATIVE EVALUATION:  Today's date: 1/24/2023    Jim Brown is a 72 year old male who presents for a preoperative evaluation.    Surgical Information:  Surgery/Procedure: Right Endoscopic Sinus Surgery   Surgery Location: [facility]   Surgeon: Kishore   Surgery Date: 1/31/23  Time of Surgery: TBD  Where patient plans to recover: At home with family  Fax number for surgical facility: Note does not need to be faxed, will be available electronically in Epic.    Type of Anesthesia Anticipated: to be determined    Assessment & Plan     The proposed surgical procedure is considered INTERMEDIATE risk.    1. Pre-operative examination  - CBC with platelets; Future  - Comprehensive metabolic panel; Future  - EKG 12-lead complete w/read - Clinics    2. Chronic pansinusitis    3. Deviated nasal septum    4. Nasal turbinate hypertrophy    5. Hyperlipidemia LDL goal <100      Risks and Recommendations:  The patient has the following additional risks and recommendations for perioperative complications:   - No identified additional risk factors other than previously addressed    Medication Instructions:  hold all medications the day of procedure  Stopped all supplements and NSAIDS    RECOMMENDATION:  APPROVAL GIVEN to proceed with proposed procedure, without further diagnostic evaluation.            Subjective     HPI related to upcoming procedure: chronic sinusitis, deviated septum.    covid test: home test and will bring in results.         Preop Questions 1/23/2023   1. Have you ever had a heart attack or stroke? No   2. Have you ever had surgery on your heart or blood vessels, such as a stent placement, a coronary artery bypass, or surgery on an artery in your head, neck, heart, or legs? No   3. Do  you have chest pain with activity? No   4. Do you have a history of  heart failure? No   5. Do you currently have a cold, bronchitis or symptoms of other infection? No   6. Do you have a cough, shortness of breath, or wheezing? No   7. Do you or anyone in your family have previous history of blood clots? No   8. Do you or does anyone in your family have a serious bleeding problem such as prolonged bleeding following surgeries or cuts? No   9. Have you ever had problems with anemia or been told to take iron pills? No   10. Have you had any abnormal blood loss such as black, tarry or bloody stools? No   11. Have you ever had a blood transfusion? No   12. Are you willing to have a blood transfusion if it is medically needed before, during, or after your surgery? Yes   13. Have you or any of your relatives ever had problems with anesthesia? YES - slow to wake up   14. Do you have sleep apnea, excessive snoring or daytime drowsiness? YES - snoring   14a. Do you have a CPAP machine? No   15. Do you have any artifical heart valves or other implanted medical devices like a pacemaker, defibrillator, or continuous glucose monitor? No   16. Do you have artificial joints? No   17. Are you allergic to latex? No     Health Care Directive:  Patient does not have a Health Care Directive or Living Will:     Preoperative Review of :   reviewed - no record of controlled substances prescribed.      Status of Chronic Conditions:  HYPERLIPIDEMIA - Patient has a long history of significant Hyperlipidemia requiring medication for treatment with recent good control. Patient reports no problems or side effects with the medication.   The 10-year ASCVD risk score (Yana CHASE, et al., 2019) is: 13.2%    Values used to calculate the score:      Age: 72 years      Sex: Male      Is Non- : No      Diabetic: No      Tobacco smoker: No      Systolic Blood Pressure: 110 mmHg      Is BP treated: No      HDL Cholesterol: 58  mg/dL      Total Cholesterol: 142 mg/dL        Review of Systems  CONSTITUTIONAL: NEGATIVE for fever, chills, change in weight  INTEGUMENTARY/SKIN: NEGATIVE for worrisome rashes, moles or lesions  EYES: NEGATIVE for vision changes or irritation  ENT/MOUTH: as above  RESP: NEGATIVE for significant cough or SOB  CV: NEGATIVE for chest pain, palpitations or peripheral edema  GI: NEGATIVE for nausea, abdominal pain, heartburn, or change in bowel habits  : NEGATIVE for frequency, dysuria, or hematuria  MUSCULOSKELETAL: NEGATIVE for significant arthralgias or myalgia  NEURO: NEGATIVE for weakness, dizziness or paresthesias  ENDOCRINE: NEGATIVE for temperature intolerance, skin/hair changes  HEME: NEGATIVE for bleeding problems  PSYCHIATRIC: NEGATIVE for changes in mood or affect    Patient Active Problem List    Diagnosis Date Noted     Hyperlipidemia LDL goal <100 08/27/2018     Priority: Medium     Gastroesophageal reflux disease with esophagitis 03/02/2016     Priority: Medium     Acute pancreatitis 03/02/2016     Priority: Medium     Compression deformity of vertebra 03/02/2016     Priority: Medium     Pericardial effusion 03/02/2016     Priority: Medium     Benign prostatic hyperplasia with urinary obstruction 04/08/2009     Priority: Medium     Overview:   IMO Update 10/11  Updated per 10/1/17 IMO import       Unilateral inguinal hernia 08/08/2008     Priority: Medium     Overview:   IMO Update    Overview:   IMO Update 10/11       Umbilical hernia 08/08/2008     Priority: Medium     Formatting of this note might be different from the original.  IMO Update       Generalized anxiety disorder 07/14/2008     Priority: Medium     Agoraphobia with panic disorder 07/14/2008     Priority: Medium     Personality disorder (H) 07/14/2008     Priority: Medium      Past Medical History:   Diagnosis Date     Arthritis      Depressive disorder      Past Surgical History:   Procedure Laterality Date     ABDOMEN SURGERY       "hernia surgerys 3-4     CHOLECYSTECTOMY  2016    galbladder      COLONOSCOPY  2018    [facility], negative for polyps 10 year      ENT SURGERY      tonsilectomy when little      Current Outpatient Medications   Medication Sig Dispense Refill     aspirin (ASA) 325 MG EC tablet Take 325 mg by mouth daily       budesonide (PULMICORT) 0.5 MG/2ML neb solution Squirt entire vial into jermaine med saline solution, mix, and irrigate each nostril until entire bottle empty.  Do this twice daily. 200 mL 11     ciclopirox (PENLAC) 8 % external solution Apply to adjacent skin and affected nails daily.  Remove with alcohol every 7 days, then repeat. 6.6 mL 1     famotidine (PEPCID) 20 MG tablet TAKE 1 TABLET (20 MG) BY MOUTH 2 TIMES DAILY AS NEEDED (HEARTBURN) 90 tablet 2     LORazepam (ATIVAN) 0.5 MG tablet Take one tablet as needed for anxiety with flying. 4 tablet 0     Multiple Vitamin (MULTI-VITAMINS) TABS        Probiotic Product (PROBIOTIC-10 PO) Take by mouth daily       sildenafil (VIAGRA) 100 MG tablet TAKE 1 TABLET (100 MG) BY MOUTH DAILY AS NEEDED (TAKE 30 MIN TO 4 HOURS AS NEEDED PRIOR TO INTERCOURSE.) 12 tablet 5     simvastatin (ZOCOR) 40 MG tablet TAKE 1 TABLET (40 MG) BY MOUTH AT BEDTIME 90 tablet 3     predniSONE (DELTASONE) 10 MG tablet Take 3 tabs after breakfast starting 2 days prior to sinus surgery (Patient not taking: Reported on 2023) 6 tablet 1       No Known Allergies     Social History     Tobacco Use     Smoking status: Former     Packs/day: 1.00     Years: 20.00     Pack years: 20.00     Types: Cigarettes     Quit date: 1971     Years since quittin.1     Smokeless tobacco: Former     Types: Chew   Substance Use Topics     Alcohol use: No       History   Drug Use No         Objective     /60 (BP Location: Right arm, Patient Position: Sitting, Cuff Size: Adult Large)   Pulse 72   Temp 97.8  F (36.6  C) (Tympanic)   Resp 12   Ht 1.854 m (6' 1\")   Wt 102.1 kg (225 lb)   SpO2 " 97%   BMI 29.69 kg/m      Physical Exam    GENERAL APPEARANCE: healthy, alert and no distress     EYES: EOMI,  PERRL     HENT: ear canals and TM's normal and nose and mouth without ulcers or lesions     NECK: no adenopathy, no asymmetry, masses, or scars and thyroid normal to palpation     RESP: lungs clear to auscultation - no rales, rhonchi or wheezes     CV: regular rates and rhythm, normal S1 S2, no S3 or S4 and no murmur, click or rub     ABDOMEN:  soft, nontender, no HSM or masses and bowel sounds normal     MS: extremities normal- no gross deformities noted, no evidence of inflammation in joints, FROM in all extremities.     SKIN: no suspicious lesions or rashes     NEURO: Normal strength and tone, sensory exam grossly normal, mentation intact and speech normal     PSYCH: mentation appears normal. and affect normal/bright     LYMPHATICS: No cervical adenopathy    Recent Labs   Lab Test 11/10/22  1420 10/24/22  0848 08/04/22  1022 04/20/22  0858 11/03/21  1357   HGB  --   --  13.9  --  13.5   PLT  --   --  249  --  226   NA  --  140 134   < > 138   POTASSIUM  --  4.1 3.8   < > 3.8   CR  --  0.91 0.84   < > 0.94   A1C 5.4  --   --   --   --     < > = values in this interval not displayed.        Diagnostics:  Results for orders placed or performed in visit on 01/24/23   CBC with platelets     Status: Abnormal   Result Value Ref Range    WBC Count 4.5 4.0 - 11.0 10e3/uL    RBC Count 4.30 (L) 4.40 - 5.90 10e6/uL    Hemoglobin 13.5 13.3 - 17.7 g/dL    Hematocrit 40.8 40.0 - 53.0 %    MCV 95 78 - 100 fL    MCH 31.4 26.5 - 33.0 pg    MCHC 33.1 31.5 - 36.5 g/dL    RDW 13.1 10.0 - 15.0 %    Platelet Count 227 150 - 450 10e3/uL   Comprehensive metabolic panel     Status: Normal   Result Value Ref Range    Sodium 136 136 - 145 mmol/L    Potassium 4.4 3.4 - 5.3 mmol/L    Chloride 100 98 - 107 mmol/L    Carbon Dioxide (CO2) 26 22 - 29 mmol/L    Anion Gap 10 7 - 15 mmol/L    Urea Nitrogen 17.9 8.0 - 23.0 mg/dL     Creatinine 0.89 0.67 - 1.17 mg/dL    Calcium 9.1 8.8 - 10.2 mg/dL    Glucose 91 70 - 99 mg/dL    Alkaline Phosphatase 62 40 - 129 U/L    AST 15 10 - 50 U/L    ALT 12 10 - 50 U/L    Protein Total 7.0 6.4 - 8.3 g/dL    Albumin 4.2 3.5 - 5.2 g/dL    Bilirubin Total 0.4 <=1.2 mg/dL    GFR Estimate >90 >60 mL/min/1.73m2        EKG: appears normal, NSR, normal axis, normal intervals, no acute ST/T changes c/w ischemia, no LVH by voltage criteria, unchanged from previous tracings    Revised Cardiac Risk Index (RCRI):  The patient has the following serious cardiovascular risks for perioperative complications:   - No serious cardiac risks = 0 points     RCRI Interpretation: 0 points: Class I (very low risk - 0.4% complication rate)           Signed Electronically by: Izzy Wagner NP  Copy of this evaluation report is provided to requesting physician.

## 2023-01-23 NOTE — PROGRESS NOTES
[facility]  [address]  [facility] [address]  Phone: [phone]  Primary Provider: Izzy Do  Pre-op Performing Provider: IZZY DO      PREOPERATIVE EVALUATION:  Today's date: 1/24/2023    Jim Brown is a 72 year old male who presents for a preoperative evaluation.    Surgical Information:  Surgery/Procedure: Right Endoscopic Sinus Surgery   Surgery Location: [facility]   Surgeon: Kishore   Surgery Date: 1/31/23  Time of Surgery: TBD  Where patient plans to recover: At home with family  Fax number for surgical facility: Note does not need to be faxed, will be available electronically in Epic.    Type of Anesthesia Anticipated: to be determined    Assessment & Plan     The proposed surgical procedure is considered INTERMEDIATE risk.    1. Pre-operative examination  - CBC with platelets; Future  - Comprehensive metabolic panel; Future  - EKG 12-lead complete w/read - Clinics    2. Chronic pansinusitis    3. Deviated nasal septum    4. Nasal turbinate hypertrophy    5. Hyperlipidemia LDL goal <100      Risks and Recommendations:  The patient has the following additional risks and recommendations for perioperative complications:   - No identified additional risk factors other than previously addressed    Medication Instructions:  hold all medications the day of procedure  Stopped all supplements and NSAIDS    RECOMMENDATION:  APPROVAL GIVEN to proceed with proposed procedure, without further diagnostic evaluation.            Subjective     HPI related to upcoming procedure: chronic sinusitis, deviated septum.    covid test: home test and will bring in results.         Preop Questions 1/23/2023   1. Have you ever had a heart attack or stroke? No   2. Have you ever had surgery on your heart or blood vessels, such as a stent placement, a coronary artery bypass, or surgery on an artery in your head, neck, heart, or legs? No   3. Do  you have chest pain with activity? No   4. Do you have a history of  heart failure? No   5. Do you currently have a cold, bronchitis or symptoms of other infection? No   6. Do you have a cough, shortness of breath, or wheezing? No   7. Do you or anyone in your family have previous history of blood clots? No   8. Do you or does anyone in your family have a serious bleeding problem such as prolonged bleeding following surgeries or cuts? No   9. Have you ever had problems with anemia or been told to take iron pills? No   10. Have you had any abnormal blood loss such as black, tarry or bloody stools? No   11. Have you ever had a blood transfusion? No   12. Are you willing to have a blood transfusion if it is medically needed before, during, or after your surgery? Yes   13. Have you or any of your relatives ever had problems with anesthesia? YES - slow to wake up   14. Do you have sleep apnea, excessive snoring or daytime drowsiness? YES - snoring   14a. Do you have a CPAP machine? No   15. Do you have any artifical heart valves or other implanted medical devices like a pacemaker, defibrillator, or continuous glucose monitor? No   16. Do you have artificial joints? No   17. Are you allergic to latex? No     Health Care Directive:  Patient does not have a Health Care Directive or Living Will:     Preoperative Review of :   reviewed - no record of controlled substances prescribed.      Status of Chronic Conditions:  HYPERLIPIDEMIA - Patient has a long history of significant Hyperlipidemia requiring medication for treatment with recent good control. Patient reports no problems or side effects with the medication.   The 10-year ASCVD risk score (Yana CHASE, et al., 2019) is: 13.2%    Values used to calculate the score:      Age: 72 years      Sex: Male      Is Non- : No      Diabetic: No      Tobacco smoker: No      Systolic Blood Pressure: 110 mmHg      Is BP treated: No      HDL Cholesterol: 58  mg/dL      Total Cholesterol: 142 mg/dL        Review of Systems  CONSTITUTIONAL: NEGATIVE for fever, chills, change in weight  INTEGUMENTARY/SKIN: NEGATIVE for worrisome rashes, moles or lesions  EYES: NEGATIVE for vision changes or irritation  ENT/MOUTH: as above  RESP: NEGATIVE for significant cough or SOB  CV: NEGATIVE for chest pain, palpitations or peripheral edema  GI: NEGATIVE for nausea, abdominal pain, heartburn, or change in bowel habits  : NEGATIVE for frequency, dysuria, or hematuria  MUSCULOSKELETAL: NEGATIVE for significant arthralgias or myalgia  NEURO: NEGATIVE for weakness, dizziness or paresthesias  ENDOCRINE: NEGATIVE for temperature intolerance, skin/hair changes  HEME: NEGATIVE for bleeding problems  PSYCHIATRIC: NEGATIVE for changes in mood or affect    Patient Active Problem List    Diagnosis Date Noted     Hyperlipidemia LDL goal <100 08/27/2018     Priority: Medium     Gastroesophageal reflux disease with esophagitis 03/02/2016     Priority: Medium     Acute pancreatitis 03/02/2016     Priority: Medium     Compression deformity of vertebra 03/02/2016     Priority: Medium     Pericardial effusion 03/02/2016     Priority: Medium     Benign prostatic hyperplasia with urinary obstruction 04/08/2009     Priority: Medium     Overview:   IMO Update 10/11  Updated per 10/1/17 IMO import       Unilateral inguinal hernia 08/08/2008     Priority: Medium     Overview:   IMO Update    Overview:   IMO Update 10/11       Umbilical hernia 08/08/2008     Priority: Medium     Formatting of this note might be different from the original.  IMO Update       Generalized anxiety disorder 07/14/2008     Priority: Medium     Agoraphobia with panic disorder 07/14/2008     Priority: Medium     Personality disorder (H) 07/14/2008     Priority: Medium      Past Medical History:   Diagnosis Date     Arthritis      Depressive disorder      Past Surgical History:   Procedure Laterality Date     ABDOMEN SURGERY       "hernia surgerys 3-4     CHOLECYSTECTOMY  2016    galbladder      COLONOSCOPY  2018    [facility], negative for polyps 10 year      ENT SURGERY      tonsilectomy when little      Current Outpatient Medications   Medication Sig Dispense Refill     aspirin (ASA) 325 MG EC tablet Take 325 mg by mouth daily       budesonide (PULMICORT) 0.5 MG/2ML neb solution Squirt entire vial into jermaine med saline solution, mix, and irrigate each nostril until entire bottle empty.  Do this twice daily. 200 mL 11     ciclopirox (PENLAC) 8 % external solution Apply to adjacent skin and affected nails daily.  Remove with alcohol every 7 days, then repeat. 6.6 mL 1     famotidine (PEPCID) 20 MG tablet TAKE 1 TABLET (20 MG) BY MOUTH 2 TIMES DAILY AS NEEDED (HEARTBURN) 90 tablet 2     LORazepam (ATIVAN) 0.5 MG tablet Take one tablet as needed for anxiety with flying. 4 tablet 0     Multiple Vitamin (MULTI-VITAMINS) TABS        Probiotic Product (PROBIOTIC-10 PO) Take by mouth daily       sildenafil (VIAGRA) 100 MG tablet TAKE 1 TABLET (100 MG) BY MOUTH DAILY AS NEEDED (TAKE 30 MIN TO 4 HOURS AS NEEDED PRIOR TO INTERCOURSE.) 12 tablet 5     simvastatin (ZOCOR) 40 MG tablet TAKE 1 TABLET (40 MG) BY MOUTH AT BEDTIME 90 tablet 3     predniSONE (DELTASONE) 10 MG tablet Take 3 tabs after breakfast starting 2 days prior to sinus surgery (Patient not taking: Reported on 2023) 6 tablet 1       No Known Allergies     Social History     Tobacco Use     Smoking status: Former     Packs/day: 1.00     Years: 20.00     Pack years: 20.00     Types: Cigarettes     Quit date: 1971     Years since quittin.1     Smokeless tobacco: Former     Types: Chew   Substance Use Topics     Alcohol use: No       History   Drug Use No         Objective     /60 (BP Location: Right arm, Patient Position: Sitting, Cuff Size: Adult Large)   Pulse 72   Temp 97.8  F (36.6  C) (Tympanic)   Resp 12   Ht 1.854 m (6' 1\")   Wt 102.1 kg (225 lb)   SpO2 " 97%   BMI 29.69 kg/m      Physical Exam    GENERAL APPEARANCE: healthy, alert and no distress     EYES: EOMI,  PERRL     HENT: ear canals and TM's normal and nose and mouth without ulcers or lesions     NECK: no adenopathy, no asymmetry, masses, or scars and thyroid normal to palpation     RESP: lungs clear to auscultation - no rales, rhonchi or wheezes     CV: regular rates and rhythm, normal S1 S2, no S3 or S4 and no murmur, click or rub     ABDOMEN:  soft, nontender, no HSM or masses and bowel sounds normal     MS: extremities normal- no gross deformities noted, no evidence of inflammation in joints, FROM in all extremities.     SKIN: no suspicious lesions or rashes     NEURO: Normal strength and tone, sensory exam grossly normal, mentation intact and speech normal     PSYCH: mentation appears normal. and affect normal/bright     LYMPHATICS: No cervical adenopathy    Recent Labs   Lab Test 11/10/22  1420 10/24/22  0848 08/04/22  1022 04/20/22  0858 11/03/21  1357   HGB  --   --  13.9  --  13.5   PLT  --   --  249  --  226   NA  --  140 134   < > 138   POTASSIUM  --  4.1 3.8   < > 3.8   CR  --  0.91 0.84   < > 0.94   A1C 5.4  --   --   --   --     < > = values in this interval not displayed.        Diagnostics:  Results for orders placed or performed in visit on 01/24/23   CBC with platelets     Status: Abnormal   Result Value Ref Range    WBC Count 4.5 4.0 - 11.0 10e3/uL    RBC Count 4.30 (L) 4.40 - 5.90 10e6/uL    Hemoglobin 13.5 13.3 - 17.7 g/dL    Hematocrit 40.8 40.0 - 53.0 %    MCV 95 78 - 100 fL    MCH 31.4 26.5 - 33.0 pg    MCHC 33.1 31.5 - 36.5 g/dL    RDW 13.1 10.0 - 15.0 %    Platelet Count 227 150 - 450 10e3/uL   Comprehensive metabolic panel     Status: Normal   Result Value Ref Range    Sodium 136 136 - 145 mmol/L    Potassium 4.4 3.4 - 5.3 mmol/L    Chloride 100 98 - 107 mmol/L    Carbon Dioxide (CO2) 26 22 - 29 mmol/L    Anion Gap 10 7 - 15 mmol/L    Urea Nitrogen 17.9 8.0 - 23.0 mg/dL     Creatinine 0.89 0.67 - 1.17 mg/dL    Calcium 9.1 8.8 - 10.2 mg/dL    Glucose 91 70 - 99 mg/dL    Alkaline Phosphatase 62 40 - 129 U/L    AST 15 10 - 50 U/L    ALT 12 10 - 50 U/L    Protein Total 7.0 6.4 - 8.3 g/dL    Albumin 4.2 3.5 - 5.2 g/dL    Bilirubin Total 0.4 <=1.2 mg/dL    GFR Estimate >90 >60 mL/min/1.73m2        EKG: appears normal, NSR, normal axis, normal intervals, no acute ST/T changes c/w ischemia, no LVH by voltage criteria, unchanged from previous tracings    Revised Cardiac Risk Index (RCRI):  The patient has the following serious cardiovascular risks for perioperative complications:   - No serious cardiac risks = 0 points     RCRI Interpretation: 0 points: Class I (very low risk - 0.4% complication rate)           Signed Electronically by: Izzy Wagner NP  Copy of this evaluation report is provided to requesting physician.

## 2023-01-23 NOTE — ANESTHESIA PREPROCEDURE EVALUATION
Anesthesia Pre-Procedure Evaluation    Patient: Jim Brown   MRN: [id number] : 1950        Procedure : Procedure(s):  Right Endoscopic Sinus Surgery  Bilateral Turbinate Reduction, Septoplasty          Past Medical History:   Diagnosis Date     Arthritis      Depressive disorder       Past Surgical History:   Procedure Laterality Date     ABDOMEN SURGERY      hernia surgerys 3-4     CHOLECYSTECTOMY  2016    galbladder      COLONOSCOPY  2018    [facility], negative for polyps 10 year      ENT SURGERY      tonsilectomy when little       No Known Allergies   Social History     Tobacco Use     Smoking status: Former     Packs/day: 1.00     Years: 20.00     Pack years: 20.00     Types: Cigarettes     Quit date: 1971     Years since quittin.1     Smokeless tobacco: Former     Types: Chew   Substance Use Topics     Alcohol use: No      Wt Readings from Last 1 Encounters:   23 97.5 kg (215 lb)        Anesthesia Evaluation   Pt has had prior anesthetic. Type: General and MAC.        ROS/MED HX  ENT/Pulmonary: Comment: Chronic pansinusitis   Deviated nasal septum  Nasal turbinate hypertrophy    (+) ARASH risk factors, snores loudly, tobacco use, Past use,     Neurologic:  - neg neurologic ROS     Cardiovascular:     (+) Dyslipidemia -----    METS/Exercise Tolerance: >4 METS    Hematologic:  - neg hematologic  ROS     Musculoskeletal:   (+) arthritis,     GI/Hepatic:     (+) GERD,     Renal/Genitourinary:     (+) BPH,     Endo:     (+) Obesity,     Psychiatric/Substance Use:     (+) psychiatric history depression, anxiety and other (comment)     Infectious Disease:  - neg infectious disease ROS     Malignancy:  - neg malignancy ROS     Other:  - neg other ROS          Physical Exam    Airway        Mallampati: IV   TM distance: > 3 FB   Neck ROM: full   Mouth opening: > 3 cm    Respiratory Devices and Support         Dental       (+) Minor Abnormalities - some fillings, tiny  chips      Cardiovascular   cardiovascular exam normal          Pulmonary   pulmonary exam normal                OUTSIDE LABS:  CBC:   Lab Results   Component Value Date    WBC 4.9 08/04/2022    WBC 4.0 11/03/2021    HGB 13.9 08/04/2022    HGB 13.5 11/03/2021    HCT 40.7 08/04/2022    HCT 40.3 11/03/2021     08/04/2022     11/03/2021     BMP:   Lab Results   Component Value Date     10/24/2022     08/04/2022    POTASSIUM 4.1 10/24/2022    POTASSIUM 3.8 08/04/2022    CHLORIDE 102 10/24/2022    CHLORIDE 101 08/04/2022    CO2 27 10/24/2022    CO2 31 08/04/2022    BUN 9.9 10/24/2022    BUN 11 08/04/2022    CR 0.91 10/24/2022    CR 0.84 08/04/2022     (H) 10/24/2022     (H) 08/04/2022     COAGS: No results found for: PTT, INR, FIBR  POC: No results found for: BGM, HCG, HCGS  HEPATIC:   Lab Results   Component Value Date    ALBUMIN 4.2 10/24/2022    PROTTOTAL 7.2 10/24/2022    ALT 15 10/24/2022    AST 21 10/24/2022    ALKPHOS 58 10/24/2022    BILITOTAL 0.4 10/24/2022     OTHER:   Lab Results   Component Value Date    A1C 5.4 11/10/2022    CARMENCITA 9.0 10/24/2022    LIPASE 185 08/04/2022    AMYLASE 48 08/04/2022    TSH 1.78 10/24/2022       Anesthesia Plan    ASA Status:  3   NPO Status:  NPO Appropriate    Anesthesia Type: General.     - Airway: ETT   Induction: Intravenous, Propofol.   Maintenance: Balanced.        Consents    Anesthesia Plan(s) and associated risks, benefits, and realistic alternatives discussed. Questions answered and patient/representative(s) expressed understanding.     - Discussed: Risks, Benefits and Alternatives for BOTH SEDATION and the PROCEDURE were discussed     - Discussed with:  Patient      - Extended Intubation/Ventilatory Support Discussed: No.      - Patient is DNR/DNI Status: No    Use of blood products discussed: No .     Postoperative Care    Pain management: IV analgesics.   PONV prophylaxis: Background Propofol Infusion, Ondansetron (or other  5HT-3), Dexamethasone or Solumedrol     Comments:    Other Comments: Discussed risks and benefits with patient for general anesthesia including sore throat, nausea, vomiting, aspiration, dental damage, loss of airway, CV complications, stroke, MI, death. Pt wishes to proceed.  1/24/23 Kami Leonardo, GLORIA CRNA

## 2023-01-24 ENCOUNTER — OFFICE VISIT (OUTPATIENT)
Dept: FAMILY MEDICINE | Facility: OTHER | Age: 73
End: 2023-01-24
Attending: NURSE PRACTITIONER
Payer: COMMERCIAL

## 2023-01-24 VITALS
RESPIRATION RATE: 12 BRPM | OXYGEN SATURATION: 97 % | HEART RATE: 72 BPM | TEMPERATURE: 97.8 F | WEIGHT: 225 LBS | BODY MASS INDEX: 29.82 KG/M2 | SYSTOLIC BLOOD PRESSURE: 110 MMHG | DIASTOLIC BLOOD PRESSURE: 60 MMHG | HEIGHT: 73 IN

## 2023-01-24 DIAGNOSIS — J32.4 CHRONIC PANSINUSITIS: ICD-10-CM

## 2023-01-24 DIAGNOSIS — E78.5 HYPERLIPIDEMIA LDL GOAL <100: ICD-10-CM

## 2023-01-24 DIAGNOSIS — Z01.818 PRE-OPERATIVE EXAMINATION: Primary | ICD-10-CM

## 2023-01-24 DIAGNOSIS — J34.3 NASAL TURBINATE HYPERTROPHY: ICD-10-CM

## 2023-01-24 DIAGNOSIS — J34.2 DEVIATED NASAL SEPTUM: ICD-10-CM

## 2023-01-24 LAB
ALBUMIN SERPL BCG-MCNC: 4.2 G/DL (ref 3.5–5.2)
ALP SERPL-CCNC: 62 U/L (ref 40–129)
ALT SERPL W P-5'-P-CCNC: 12 U/L (ref 10–50)
ANION GAP SERPL CALCULATED.3IONS-SCNC: 10 MMOL/L (ref 7–15)
AST SERPL W P-5'-P-CCNC: 15 U/L (ref 10–50)
BILIRUB SERPL-MCNC: 0.4 MG/DL
BUN SERPL-MCNC: 17.9 MG/DL (ref 8–23)
CALCIUM SERPL-MCNC: 9.1 MG/DL (ref 8.8–10.2)
CHLORIDE SERPL-SCNC: 100 MMOL/L (ref 98–107)
CREAT SERPL-MCNC: 0.89 MG/DL (ref 0.67–1.17)
DEPRECATED HCO3 PLAS-SCNC: 26 MMOL/L (ref 22–29)
ERYTHROCYTE [DISTWIDTH] IN BLOOD BY AUTOMATED COUNT: 13.1 % (ref 10–15)
GFR SERPL CREATININE-BSD FRML MDRD: >90 ML/MIN/1.73M2
GLUCOSE SERPL-MCNC: 91 MG/DL (ref 70–99)
HCT VFR BLD AUTO: 40.8 % (ref 40–53)
HGB BLD-MCNC: 13.5 G/DL (ref 13.3–17.7)
MCH RBC QN AUTO: 31.4 PG (ref 26.5–33)
MCHC RBC AUTO-ENTMCNC: 33.1 G/DL (ref 31.5–36.5)
MCV RBC AUTO: 95 FL (ref 78–100)
PLATELET # BLD AUTO: 227 10E3/UL (ref 150–450)
POTASSIUM SERPL-SCNC: 4.4 MMOL/L (ref 3.4–5.3)
PROT SERPL-MCNC: 7 G/DL (ref 6.4–8.3)
RBC # BLD AUTO: 4.3 10E6/UL (ref 4.4–5.9)
SODIUM SERPL-SCNC: 136 MMOL/L (ref 136–145)
WBC # BLD AUTO: 4.5 10E3/UL (ref 4–11)

## 2023-01-24 PROCEDURE — 80053 COMPREHEN METABOLIC PANEL: CPT | Mod: ZL | Performed by: NURSE PRACTITIONER

## 2023-01-24 PROCEDURE — 99214 OFFICE O/P EST MOD 30 MIN: CPT | Performed by: NURSE PRACTITIONER

## 2023-01-24 PROCEDURE — 36415 COLL VENOUS BLD VENIPUNCTURE: CPT | Mod: ZL | Performed by: NURSE PRACTITIONER

## 2023-01-24 PROCEDURE — 93010 ELECTROCARDIOGRAM REPORT: CPT | Mod: 77 | Performed by: INTERNAL MEDICINE

## 2023-01-24 PROCEDURE — 85027 COMPLETE CBC AUTOMATED: CPT | Mod: ZL | Performed by: NURSE PRACTITIONER

## 2023-01-24 PROCEDURE — G0463 HOSPITAL OUTPT CLINIC VISIT: HCPCS | Mod: 25 | Performed by: NURSE PRACTITIONER

## 2023-01-24 PROCEDURE — 93005 ELECTROCARDIOGRAM TRACING: CPT | Performed by: NURSE PRACTITIONER

## 2023-01-24 NOTE — PATIENT INSTRUCTIONS
Assessment & Plan     The proposed surgical procedure is considered INTERMEDIATE risk.    1. Pre-operative examination  - CBC with platelets; Future  - Comprehensive metabolic panel; Future  - EKG 12-lead complete w/read - Clinics    2. Chronic pansinusitis    3. Deviated nasal septum    4. Nasal turbinate hypertrophy    5. Hyperlipidemia LDL goal <100    Follow-up as needed    Izzy Wagner,   Certified Adult Nurse Practitioner  [phone]

## 2023-01-25 DIAGNOSIS — J32.4 CHRONIC PANSINUSITIS: ICD-10-CM

## 2023-01-25 RX ORDER — PREDNISONE 10 MG/1
TABLET ORAL
Qty: 6 TABLET | Refills: 1 | Status: SHIPPED | OUTPATIENT
Start: 2023-01-25 | End: 2023-02-21

## 2023-01-25 NOTE — TELEPHONE ENCOUNTER
predniSONE (DELTASONE) 10 MG tablet   Last Written Prescription Date:  1-6-23  Last Fill Quantity: 6,   # refills: 1  Last Office Visit: 1-6-23  Future Office visit:       Routing refill request to provider for review/approval because:  Drug not on the [facility], [facility] or [facility] refill protocol or controlled substance

## 2023-01-26 NOTE — OR NURSING
"Spoke with patient and asked if he has had anymore seizure type episodes or altered consciousness.  Jim states \"no, I haven't had any of those, nothing.\" I have vertigo and I think I was just dizzy with that\" I've been fine\".   "

## 2023-01-31 ENCOUNTER — ANESTHESIA (OUTPATIENT)
Dept: SURGERY | Facility: HOSPITAL | Age: 73
End: 2023-01-31
Payer: MEDICARE

## 2023-01-31 ENCOUNTER — HOSPITAL ENCOUNTER (OUTPATIENT)
Facility: HOSPITAL | Age: 73
Discharge: HOME | End: 2023-01-31
Attending: OTOLARYNGOLOGY | Admitting: OTOLARYNGOLOGY
Payer: MEDICARE

## 2023-01-31 VITALS
BODY MASS INDEX: 30.48 KG/M2 | OXYGEN SATURATION: 96 % | HEIGHT: 72 IN | RESPIRATION RATE: 16 BRPM | TEMPERATURE: 97.4 F | DIASTOLIC BLOOD PRESSURE: 93 MMHG | WEIGHT: 225 LBS | HEART RATE: 76 BPM | SYSTOLIC BLOOD PRESSURE: 154 MMHG

## 2023-01-31 DIAGNOSIS — Z98.890 S/P FESS (FUNCTIONAL ENDOSCOPIC SINUS SURGERY): Primary | ICD-10-CM

## 2023-01-31 LAB
GRAM STAIN RESULT: ABNORMAL

## 2023-01-31 PROCEDURE — 87077 CULTURE AEROBIC IDENTIFY: CPT | Mod: 91 | Performed by: OTOLARYNGOLOGY

## 2023-01-31 PROCEDURE — C9046 COCAINE HCL NASAL SOLUTION: HCPCS | Performed by: OTOLARYNGOLOGY

## 2023-01-31 PROCEDURE — 360N000076 HC SURGERY LEVEL 3, PER MIN: Performed by: OTOLARYNGOLOGY

## 2023-01-31 PROCEDURE — 710N000010 HC RECOVERY PHASE 1, LEVEL 2, PER MIN: Performed by: OTOLARYNGOLOGY

## 2023-01-31 PROCEDURE — 87185 SC STD ENZYME DETCJ PER NZM: CPT | Performed by: OTOLARYNGOLOGY

## 2023-01-31 PROCEDURE — 30520 REPAIR OF NASAL SEPTUM: CPT | Performed by: NURSE ANESTHETIST, CERTIFIED REGISTERED

## 2023-01-31 PROCEDURE — 272N000001 HC OR GENERAL SUPPLY STERILE: Performed by: OTOLARYNGOLOGY

## 2023-01-31 PROCEDURE — 30140 RESECT INFERIOR TURBINATE: CPT | Mod: 50 | Performed by: OTOLARYNGOLOGY

## 2023-01-31 PROCEDURE — 99100 ANES PT EXTEME AGE<1 YR&>70: CPT | Performed by: NURSE ANESTHETIST, CERTIFIED REGISTERED

## 2023-01-31 PROCEDURE — 258N000003 HC RX IP 258 OP 636: Performed by: NURSE ANESTHETIST, CERTIFIED REGISTERED

## 2023-01-31 PROCEDURE — 87102 FUNGUS ISOLATION CULTURE: CPT | Performed by: OTOLARYNGOLOGY

## 2023-01-31 PROCEDURE — 30520 REPAIR OF NASAL SEPTUM: CPT | Performed by: OTOLARYNGOLOGY

## 2023-01-31 PROCEDURE — 88304 TISSUE EXAM BY PATHOLOGIST: CPT | Mod: 26 | Performed by: PATHOLOGY

## 2023-01-31 PROCEDURE — 31259 NSL/SINS NDSC SPHN TISS RMVL: CPT | Mod: RT | Performed by: OTOLARYNGOLOGY

## 2023-01-31 PROCEDURE — 250N000009 HC RX 250: Performed by: NURSE ANESTHETIST, CERTIFIED REGISTERED

## 2023-01-31 PROCEDURE — 250N000009 HC RX 250: Performed by: OTOLARYNGOLOGY

## 2023-01-31 PROCEDURE — 250N000009 HC RX 250

## 2023-01-31 PROCEDURE — 710N000012 HC RECOVERY PHASE 2, PER MINUTE: Performed by: OTOLARYNGOLOGY

## 2023-01-31 PROCEDURE — 250N000011 HC RX IP 250 OP 636: Performed by: NURSE ANESTHETIST, CERTIFIED REGISTERED

## 2023-01-31 PROCEDURE — 370N000017 HC ANESTHESIA TECHNICAL FEE, PER MIN: Performed by: OTOLARYNGOLOGY

## 2023-01-31 PROCEDURE — 61782 SCAN PROC CRANIAL EXTRA: CPT | Performed by: OTOLARYNGOLOGY

## 2023-01-31 PROCEDURE — 88304 TISSUE EXAM BY PATHOLOGIST: CPT | Mod: TC | Performed by: OTOLARYNGOLOGY

## 2023-01-31 PROCEDURE — 31276 NSL/SINS NDSC FRNT TISS RMVL: CPT | Mod: RT | Performed by: OTOLARYNGOLOGY

## 2023-01-31 PROCEDURE — 250N000011 HC RX IP 250 OP 636: Performed by: OTOLARYNGOLOGY

## 2023-01-31 PROCEDURE — 250N000025 HC SEVOFLURANE, PER MIN: Performed by: OTOLARYNGOLOGY

## 2023-01-31 PROCEDURE — 87205 SMEAR GRAM STAIN: CPT | Performed by: OTOLARYNGOLOGY

## 2023-01-31 PROCEDURE — 999N000141 HC STATISTIC PRE-PROCEDURE NURSING ASSESSMENT: Performed by: OTOLARYNGOLOGY

## 2023-01-31 RX ORDER — HYDROMORPHONE HYDROCHLORIDE 1 MG/ML
0.5 INJECTION, SOLUTION INTRAMUSCULAR; INTRAVENOUS; SUBCUTANEOUS EVERY 5 MIN PRN
Status: DISCONTINUED | OUTPATIENT
Start: 2023-01-31 | End: 2023-01-31 | Stop reason: HOSPADM

## 2023-01-31 RX ORDER — KETAMINE HYDROCHLORIDE 10 MG/ML
INJECTION INTRAMUSCULAR; INTRAVENOUS PRN
Status: DISCONTINUED | OUTPATIENT
Start: 2023-01-31 | End: 2023-01-31

## 2023-01-31 RX ORDER — PROPOFOL 10 MG/ML
INJECTION, EMULSION INTRAVENOUS PRN
Status: DISCONTINUED | OUTPATIENT
Start: 2023-01-31 | End: 2023-01-31

## 2023-01-31 RX ORDER — COCAINE HYDROCHLORIDE 40 MG/ML
SOLUTION NASAL PRN
Status: DISCONTINUED | OUTPATIENT
Start: 2023-01-31 | End: 2023-01-31 | Stop reason: HOSPADM

## 2023-01-31 RX ORDER — LIDOCAINE HYDROCHLORIDE AND EPINEPHRINE 10; 10 MG/ML; UG/ML
INJECTION, SOLUTION INFILTRATION; PERINEURAL
Status: DISCONTINUED
Start: 2023-01-31 | End: 2023-01-31 | Stop reason: HOSPADM

## 2023-01-31 RX ORDER — TRIAMCINOLONE ACETONIDE 40 MG/ML
INJECTION, SUSPENSION INTRA-ARTICULAR; INTRAMUSCULAR
Status: DISCONTINUED
Start: 2023-01-31 | End: 2023-01-31 | Stop reason: HOSPADM

## 2023-01-31 RX ORDER — LIDOCAINE 40 MG/G
CREAM TOPICAL
Status: DISCONTINUED | OUTPATIENT
Start: 2023-01-31 | End: 2023-01-31 | Stop reason: HOSPADM

## 2023-01-31 RX ORDER — OXYCODONE HYDROCHLORIDE 5 MG/1
10 TABLET ORAL EVERY 4 HOURS PRN
Status: DISCONTINUED | OUTPATIENT
Start: 2023-01-31 | End: 2023-01-31 | Stop reason: HOSPADM

## 2023-01-31 RX ORDER — LIDOCAINE HYDROCHLORIDE 20 MG/ML
INJECTION, SOLUTION INFILTRATION; PERINEURAL PRN
Status: DISCONTINUED | OUTPATIENT
Start: 2023-01-31 | End: 2023-01-31

## 2023-01-31 RX ORDER — EPINEPHRINE 1 MG/ML
INJECTION, SOLUTION INTRAMUSCULAR; SUBCUTANEOUS
Status: DISCONTINUED
Start: 2023-01-31 | End: 2023-01-31 | Stop reason: WASHOUT

## 2023-01-31 RX ORDER — NALOXONE HYDROCHLORIDE 0.4 MG/ML
0.4 INJECTION, SOLUTION INTRAMUSCULAR; INTRAVENOUS; SUBCUTANEOUS
Status: DISCONTINUED | OUTPATIENT
Start: 2023-01-31 | End: 2023-01-31 | Stop reason: HOSPADM

## 2023-01-31 RX ORDER — NALOXONE HYDROCHLORIDE 0.4 MG/ML
0.2 INJECTION, SOLUTION INTRAMUSCULAR; INTRAVENOUS; SUBCUTANEOUS
Status: DISCONTINUED | OUTPATIENT
Start: 2023-01-31 | End: 2023-01-31 | Stop reason: HOSPADM

## 2023-01-31 RX ORDER — EPHEDRINE SULFATE 50 MG/ML
INJECTION, SOLUTION INTRAMUSCULAR; INTRAVENOUS; SUBCUTANEOUS PRN
Status: DISCONTINUED | OUTPATIENT
Start: 2023-01-31 | End: 2023-01-31

## 2023-01-31 RX ORDER — SODIUM CHLORIDE, SODIUM LACTATE, POTASSIUM CHLORIDE, CALCIUM CHLORIDE 600; 310; 30; 20 MG/100ML; MG/100ML; MG/100ML; MG/100ML
INJECTION, SOLUTION INTRAVENOUS CONTINUOUS
Status: DISCONTINUED | OUTPATIENT
Start: 2023-01-31 | End: 2023-01-31 | Stop reason: HOSPADM

## 2023-01-31 RX ORDER — OXYMETAZOLINE HYDROCHLORIDE 0.05 G/100ML
3 SPRAY NASAL
Status: COMPLETED | OUTPATIENT
Start: 2023-01-31 | End: 2023-01-31

## 2023-01-31 RX ORDER — FENTANYL CITRATE 50 UG/ML
INJECTION, SOLUTION INTRAMUSCULAR; INTRAVENOUS PRN
Status: DISCONTINUED | OUTPATIENT
Start: 2023-01-31 | End: 2023-01-31

## 2023-01-31 RX ORDER — ONDANSETRON 2 MG/ML
INJECTION INTRAMUSCULAR; INTRAVENOUS PRN
Status: DISCONTINUED | OUTPATIENT
Start: 2023-01-31 | End: 2023-01-31

## 2023-01-31 RX ORDER — DEXAMETHASONE SODIUM PHOSPHATE 10 MG/ML
INJECTION, SOLUTION INTRAMUSCULAR; INTRAVENOUS PRN
Status: DISCONTINUED | OUTPATIENT
Start: 2023-01-31 | End: 2023-01-31

## 2023-01-31 RX ORDER — FENTANYL CITRATE 50 UG/ML
25 INJECTION, SOLUTION INTRAMUSCULAR; INTRAVENOUS
Status: DISCONTINUED | OUTPATIENT
Start: 2023-01-31 | End: 2023-01-31 | Stop reason: HOSPADM

## 2023-01-31 RX ORDER — COCAINE HYDROCHLORIDE 40 MG/ML
SOLUTION NASAL
Status: DISCONTINUED
Start: 2023-01-31 | End: 2023-01-31 | Stop reason: HOSPADM

## 2023-01-31 RX ORDER — CEFAZOLIN SODIUM 1 G/3ML
INJECTION, POWDER, FOR SOLUTION INTRAMUSCULAR; INTRAVENOUS
Status: DISCONTINUED
Start: 2023-01-31 | End: 2023-01-31 | Stop reason: HOSPADM

## 2023-01-31 RX ORDER — OXYMETAZOLINE HYDROCHLORIDE 0.05 G/100ML
SPRAY NASAL
Status: DISCONTINUED
Start: 2023-01-31 | End: 2023-01-31 | Stop reason: WASHOUT

## 2023-01-31 RX ORDER — OXYCODONE HYDROCHLORIDE 5 MG/1
5 TABLET ORAL EVERY 4 HOURS PRN
Status: DISCONTINUED | OUTPATIENT
Start: 2023-01-31 | End: 2023-01-31 | Stop reason: HOSPADM

## 2023-01-31 RX ORDER — PREDNISONE 20 MG/1
TABLET ORAL
Qty: 4 TABLET | Refills: 0 | Status: SHIPPED | OUTPATIENT
Start: 2023-02-01 | End: 2023-02-21

## 2023-01-31 RX ORDER — LIDOCAINE HYDROCHLORIDE AND EPINEPHRINE 10; 10 MG/ML; UG/ML
INJECTION, SOLUTION INFILTRATION; PERINEURAL PRN
Status: DISCONTINUED | OUTPATIENT
Start: 2023-01-31 | End: 2023-01-31 | Stop reason: HOSPADM

## 2023-01-31 RX ORDER — OXYMETAZOLINE HYDROCHLORIDE 0.05 G/100ML
SPRAY NASAL
Status: COMPLETED
Start: 2023-01-31 | End: 2023-01-31

## 2023-01-31 RX ORDER — TRIAMCINOLONE ACETONIDE 40 MG/ML
INJECTION, SUSPENSION INTRA-ARTICULAR; INTRAMUSCULAR PRN
Status: DISCONTINUED | OUTPATIENT
Start: 2023-01-31 | End: 2023-01-31 | Stop reason: HOSPADM

## 2023-01-31 RX ORDER — ONDANSETRON 2 MG/ML
4 INJECTION INTRAMUSCULAR; INTRAVENOUS EVERY 30 MIN PRN
Status: DISCONTINUED | OUTPATIENT
Start: 2023-01-31 | End: 2023-01-31 | Stop reason: HOSPADM

## 2023-01-31 RX ORDER — FENTANYL CITRATE 50 UG/ML
50 INJECTION, SOLUTION INTRAMUSCULAR; INTRAVENOUS EVERY 5 MIN PRN
Status: DISCONTINUED | OUTPATIENT
Start: 2023-01-31 | End: 2023-01-31 | Stop reason: HOSPADM

## 2023-01-31 RX ORDER — ONDANSETRON 4 MG/1
4 TABLET, ORALLY DISINTEGRATING ORAL EVERY 30 MIN PRN
Status: DISCONTINUED | OUTPATIENT
Start: 2023-01-31 | End: 2023-01-31 | Stop reason: HOSPADM

## 2023-01-31 RX ORDER — HYDRALAZINE HYDROCHLORIDE 20 MG/ML
2.5-5 INJECTION INTRAMUSCULAR; INTRAVENOUS EVERY 10 MIN PRN
Status: DISCONTINUED | OUTPATIENT
Start: 2023-01-31 | End: 2023-01-31 | Stop reason: HOSPADM

## 2023-01-31 RX ORDER — PROPOFOL 10 MG/ML
INJECTION, EMULSION INTRAVENOUS CONTINUOUS PRN
Status: DISCONTINUED | OUTPATIENT
Start: 2023-01-31 | End: 2023-01-31

## 2023-01-31 RX ORDER — LABETALOL 20 MG/4 ML (5 MG/ML) INTRAVENOUS SYRINGE
10
Status: DISCONTINUED | OUTPATIENT
Start: 2023-01-31 | End: 2023-01-31 | Stop reason: HOSPADM

## 2023-01-31 RX ORDER — OXYCODONE HYDROCHLORIDE 5 MG/1
5 TABLET ORAL EVERY 6 HOURS PRN
Qty: 5 TABLET | Refills: 0 | Status: SHIPPED | OUTPATIENT
Start: 2023-01-31 | End: 2023-02-03

## 2023-01-31 RX ADMIN — SUGAMMADEX 200 MG: 100 INJECTION, SOLUTION INTRAVENOUS at 13:40

## 2023-01-31 RX ADMIN — OXYMETAZOLINE HYDROCHLORIDE 3 SPRAY: 0.05 SPRAY NASAL at 09:11

## 2023-01-31 RX ADMIN — ONDANSETRON 4 MG: 2 INJECTION INTRAMUSCULAR; INTRAVENOUS at 12:11

## 2023-01-31 RX ADMIN — OXYMETAZOLINE HYDROCHLORIDE 3 SPRAY: 0.05 SPRAY NASAL at 09:34

## 2023-01-31 RX ADMIN — SODIUM CHLORIDE, POTASSIUM CHLORIDE, SODIUM LACTATE AND CALCIUM CHLORIDE: 600; 310; 30; 20 INJECTION, SOLUTION INTRAVENOUS at 12:52

## 2023-01-31 RX ADMIN — PROPOFOL 100 MCG/KG/MIN: 10 INJECTION, EMULSION INTRAVENOUS at 12:11

## 2023-01-31 RX ADMIN — Medication 10 MG: at 12:38

## 2023-01-31 RX ADMIN — Medication 30 MG: at 12:11

## 2023-01-31 RX ADMIN — Medication 5 MG: at 13:22

## 2023-01-31 RX ADMIN — FENTANYL CITRATE 50 MCG: 50 INJECTION, SOLUTION INTRAMUSCULAR; INTRAVENOUS at 12:10

## 2023-01-31 RX ADMIN — LIDOCAINE HYDROCHLORIDE 40 MG: 20 INJECTION, SOLUTION INFILTRATION; PERINEURAL at 12:10

## 2023-01-31 RX ADMIN — SODIUM CHLORIDE, POTASSIUM CHLORIDE, SODIUM LACTATE AND CALCIUM CHLORIDE: 600; 310; 30; 20 INJECTION, SOLUTION INTRAVENOUS at 09:10

## 2023-01-31 RX ADMIN — ROCURONIUM BROMIDE 10 MG: 10 INJECTION INTRAVENOUS at 12:11

## 2023-01-31 RX ADMIN — FENTANYL CITRATE 50 MCG: 50 INJECTION, SOLUTION INTRAMUSCULAR; INTRAVENOUS at 13:01

## 2023-01-31 RX ADMIN — Medication 10 MG: at 12:33

## 2023-01-31 RX ADMIN — OXYMETAZOLINE HYDROCHLORIDE 3 SPRAY: 0.05 SPRAY NASAL at 09:22

## 2023-01-31 RX ADMIN — Medication 100 MG: at 12:12

## 2023-01-31 RX ADMIN — PROPOFOL 130 MG: 10 INJECTION, EMULSION INTRAVENOUS at 12:11

## 2023-01-31 RX ADMIN — ROCURONIUM BROMIDE 40 MG: 10 INJECTION INTRAVENOUS at 12:38

## 2023-01-31 RX ADMIN — DEXAMETHASONE SODIUM PHOSPHATE 10 MG: 10 INJECTION, SOLUTION INTRAMUSCULAR; INTRAVENOUS at 12:12

## 2023-01-31 RX ADMIN — OXYMETAZOLINE HCL 3 SPRAY: 0.05 SPRAY NASAL at 09:11

## 2023-01-31 ASSESSMENT — ACTIVITIES OF DAILY LIVING (ADL)
ADLS_ACUITY_SCORE: 35

## 2023-01-31 NOTE — OP NOTE
Otolaryngology Operative Note     Pre-op Diagnosis: Right chronic pansinusitis, bilateral inferior turbinate hypertrophy, deviated nasal septum  Post-op Diagnosis:  Same    Procedures:    1.  Right sphenoidotomy with removal sphenoid sinus fungal ball  2.  Right total ethmoidectomy  3.  Right frontal sinusotomy  4.  Endoscopic septoplasty  5.  Bilateral submucous reduction inferior turbinates    Sinus procedures performed with [employer] navigation  Surgeon:  Lexy Novak D.O.  Anesthesia:  General endotracheal  EBL:  10 ml  Findings: Allergic fungal appearing debris filling the right sphenoid sinus  Evidence of prior partial anterior ethmoidectomy on the right  Evidence of prior septoplasty with residual right septal deviation  Complications:  none  Condition:  stable     Description of the Procedure  After surgical consent was obtained the patient was brought back to the operating room and laid in a comfortable and supine position.  The patient was administered a general anesthetic by a member of anesthesia.  The table was turned 180 degrees.  The patient was draped in the normal clean fashion and a timeout was taken.  The bed was placed into reverse Trendelenburg positioning.  A timeout was taken.  Cocaine pledgets were placed into the nares for several minutes and removed.  The lateral nasal wall, middle turbinates, inferior turbinates were anesthetized with 1% lidocaine with 1-100,000 epinephrine.    I proceeded with the submucosal reduction of the inferior turbinates. Under endoscopy, I used a 2mm inferior turbinate blade and started on the left side. I made a stab incision at the anterior insertion of the left inferior turbinate and raised a submucoperiosteal tunnel along the medial surface of the left inferior turbinate bone. I then slowly withdrew the shaver blade as I ran the shaver to perform my submucous resection.  Careful attention was turned to the area of the internal nasal valve for  complete reduction.  The turbinate was then outfractured with a Danese.  I then performed the same procedure on the right side in a similar fashion with outfracture.    I then turned attention to the septoplasty.  Using an endoscope I made a mid septal incision with a 15 blade and elevated a mucoperichondrial and periosteal flap with some difficulty secondary to former septoplasty and scar tissue.  I was unable to use a D knife V chisel and Adrianne's to remove the obstructive spur along the vomer and residual right perpendicular plate of the ethmoid deviation.  I was unable to bluntly displace the septum toward the midline with a septal displacer.  The septum is midline and intact.  I placed 4-0 Vicryl sutures in a horizontal mattress fast Rachael for further medialization and for closure of the incision.    Next I used a 30 degree endoscope and turned my attention to the right side.  I medialized the middle turbinate.  There is evidence of partial anterior ethmoidectomy with completed ethmoid bulla.  Identified the lamina and preserve this throughout.  I removed bony septations and the mildly polypoid mucosa with the microdebrider blade.  I skeletonized the lamina and then skeletonized the ground lamella and entered the posterior ethmoid cavity inferior medial.  Identified the skull base and operative from a posterior to anterior fashion removing bony septations and polypoid mucosa.  Next I entered the frontal recess with a curved suction.  Using a 30 degree endoscope was able to visualize the entire frontal recess and entered the frontal sinus with a curved suction under navigation.  The sinus was irrigated with saline and suctioned and is patent.    Next I directed the straight suction under navigation and a transnasal fashion to the face of the sphenoid.  I entered the sphenoid sinus on the right inferior and medial.  The sphenoid sinus is filled with fungal debris which was removed removed with a straight suction  on a Lukey tube and collected for culture.  I used the Hydro cleanse to irrigate and completely remove all remaining fungal debris.  The sinus was examined and is completely clear.  The mucosa is mildly edematous but without concerning polypoid change.  The walls of the sphenoid sinus are intact and there is no bleeding.  I irrigated the sinuses with Ancef and saline and irrigated them until clear.  I sections of the anterior posterior ethmoid cavity frontal sinus and sphenoid sinus.  Hemostasis is adequate. I placed half a piece of nasopore soaked in Kenalog over the sphenoidotomy site.  Next I used silver nitrate to achieve hemostasis at the turbinate reduction sites.  I then placed the other half of the nasal pore soaked in Kenalog into the right lateral nasal wall.    The patient was handed back over anesthesia awakened and brought to recovery room in stable condition having tolerated the procedure well.

## 2023-01-31 NOTE — DISCHARGE INSTRUCTIONS
Instructions for Sinus Surgery    Diagnosis:  Right sphenoid sinus fungal ball  This is usually inflammatory/allergic and is NOT invasive  No indication for oral/systemic antifungals  Treated with surgery (completed) and steroid irrigations (budesonide irrigations)  Final cultures and pathology pending    Recovery - Everyone recovers differently from a general anesthetic.  Symptoms such as fatigue, nausea, light-headedness, and sometimes a low grade fever (up to 100 degrees) are not unusual.  As your body removes the anesthetic drugs from circulation, these symptoms will resolve.  Your nose will be sore after surgery, and you may even have symptoms similar to a sinus infection with headache, congestion, and pressure.  These will resolve with healing.  For several days you may experience bloody drainage from the nose, please use the drip pad as necessary for this.  If there is persistent bleeding, please call the office during business hours or the on call ENT physician after hours.  There are no diet restrictions after sinus surgery, and you can resume your home medications.      Please do not blow your nose until 2 weeks after surgery.       Limit your activity to no strenuous activities until I see you for the first follow-up visit in approximately 2 weeks.      Medications - You were sent home with narcotic pain medication.  If you can tolerate the discomfort during your recovery by using just plain Tylenol or ibuprofen (advil), please do so.  However, do not hesitate to use the stronger pain medication if needed.  If you were sent home with an antibiotic, it is primarily used to help the healing process.  If it causes loose bowel movements or other signs of intolerance, it is appropriate to discontinue it.  By far the most important measure you can take to speed recovery, and maximize the chances of long term success of sinus surgery is using the sinus rinses at least three time per day for the first month after  surgery.       Start budesonide irrigations tomorrow.  Use 2 times daily for one month and then as directed.  Start Jermaine Med saline irrigation tonight and use at least 3 times daily.   Continue twice daily budesonide irrigations and 2-3 times daily NeilMed saline irrigations for 1 month and then as directed by Dr. Novak    Budesonide instructions  Make the saline solution using 2 packages of salt and previously boiled or distilled water.  This will make 240 ml of saline solution.  Mix the entire vial of budesonide into the solution.   Irrigate your nose 2 times a day with the warm budesonide solution using 1 bottle between nostrils in the morning, and one bottle at night.   Use plain jermaine med saline without the steroid 2-3 times during the afternoon    Perform gentle irrigation for the first week.  Starting 1 week after surgery, you can start to irrigate a bit more forcefully.  The more often you irrigate with the jermaine med saline, the faster you will heal.      At 3 weeks after surgery you may restart nasal steroids if needed (flonase, nasonex, etc).      Complications - Problems related to sinus surgery almost always are detected during the operation, and special instruction will be given in that situation.  However, unexpected things can happen, and are all related to the structures around the sinus cavities.  Symptoms that should alert you to a possible problem include: severe eye pain or eye swelling, persistent heavy bleeding from the nose, and high fevers with headache and neck pain.  Any of these symptoms should be called into my office or to the on call ENT if after hours.  The most common non-emergency complication of sinus surgery is the formation of scar tissue which can re-block the sinuses.  This is addressed below.    Follow-up -  As you have noted, there are quite a few follow-up visits after sinus surgery.  This is done to aggressively manage the most common complication of this technique,  which is scar tissue blocking the sinuses.  These visits will require the examination of your nose and possibly removal of crusts of dry mucous and blood, with possible removal of early scar tissue.  Please prepare for these visits by using your sinus rinses.  See Rupal Burciaga in 2 weeks, Dr. Novak in 4-6 weeks.     If there are any questions or issues with the above, or if there are other issues that concern you, always feel free to call the clinic and I am happy to speak with you as soon as I can.    Lexy Novak D.O.  Otolaryngology/Head and Neck Surgery  Allergy    [phone]   extension [phone]        Post-Anesthesia Patient Instructions    IMMEDIATELY FOLLOWING SURGERY:  Do not drive or operate machinery for the first twenty four hours after surgery.  Do not make any important decisions for twenty four hours after surgery or while taking narcotic pain medications or sedatives.  If you develop intractable nausea and vomiting or a severe headache please notify your doctor immediately.    FOLLOW-UP:  Please make an appointment with your surgeon as instructed. You do not need to follow up with anesthesia unless specifically instructed to do so.    WOUND CARE INSTRUCTIONS (if applicable):  Keep a dry clean dressing on the anesthesia/puncture wound site if there is drainage.  Once the wound has quit draining you may leave it open to air.  Generally you should leave the bandage intact for twenty four hours unless there is drainage.  If the epidural site drains for more than 36-48 hours please call the anesthesia department.    QUESTIONS?:  Please feel free to call your physician or the hospital  if you have any questions, and they will be happy to assist you.

## 2023-01-31 NOTE — ANESTHESIA PROCEDURE NOTES
Airway         Procedure Start/Stop Times: 1/31/2023 12:13 PM  Staff -        CRNA: Fede Leonard APRN CRNA       Other Anesthesia Staff: Con Mims       Performed By: SE and with CRNAs       Procedure performed by resident/fellow/CRNA in presence of a teaching physician.    Consent for Airway        Urgency: elective  Indications and Patient Condition       Indications for airway management: clifford-procedural       Induction type:RSI       Mask difficulty assessment: 0 - not attempted    Final Airway Details       Final airway type: endotracheal airway       Successful airway: ETT - single  Endotracheal Airway Details        ETT size (mm): 7.5       Cuffed: yes       Successful intubation technique: direct laryngoscopy       DL Blade Type: MAC 4       Grade View of Cords: 3       Adjucts: stylet       Position: Right       Measured from: gums/teeth       Secured at (cm): 23       Bite block used: None    Post intubation assessment        Placement verified by: capnometry, equal breath sounds and chest rise        Number of attempts at approach: 1       Number of other approaches attempted: 0       Secured with: plastic tape       Ease of procedure: easy       Dentition: Intact and Unchanged    Medication(s) Administered   Medication Administration Time: 1/31/2023 12:13 PM

## 2023-01-31 NOTE — INTERVAL H&P NOTE
I have reviewed the surgical (or preoperative) H&P that is linked to this encounter, and examined the patient. There are no significant changes    Clinical Conditions Present on Arrival:  Clinically Significant Risk Factors Present on Admission                    # Obesity: Estimated body mass index is 30.52 kg/m  as calculated from the following:    Height as of this encounter: 1.829 m (6').    Weight as of this encounter: 102.1 kg (225 lb).

## 2023-01-31 NOTE — OR NURSING
Patient is ready for discharge to home.  Patient able to sit at the edge of the bed; notes he has some dizziness but that is normal for him.  IV removed.  Discharge instructions reviewed with patient and wife; both verbalize understanding and have no further questions and/or concerns.  Patient does have some bleeding noted from the right nostril after sitting up; nasal sling in place.  Patient able to dress self and to vehicle via wc.

## 2023-01-31 NOTE — ANESTHESIA CARE TRANSFER NOTE
Patient: Jim Brown    Procedure: Procedure(s):  Right Endoscopic Sinus Surgery with removal of right sphenoid sinus fungal ball  Bilateral Turbinate Reduction, Septoplasty       Diagnosis: Chronic pansinusitis [J32.4]  Deviated nasal septum [J34.2]  Nasal turbinate hypertrophy [J34.3]  Diagnosis Additional Information: No value filed.    Anesthesia Type:   General     Note:    Oropharynx: oropharynx clear of all foreign objects and spontaneously breathing  Level of Consciousness: drowsy  Oxygen Supplementation: room air    Independent Airway: airway patency satisfactory and stable  Dentition: dentition unchanged  Vital Signs Stable: post-procedure vital signs reviewed and stable  Report to RN Given: handoff report given  Patient transferred to: PACU    Handoff Report: Identifed the Patient, Identified the Reponsible Provider, Reviewed the pertinent medical history, Discussed the surgical course, Reviewed Intra-OP anesthesia mangement and issues during anesthesia, Set expectations for post-procedure period and Allowed opportunity for questions and acknowledgement of understanding      Vitals:  Vitals Value Taken Time   /84 01/31/23 1357   Temp     Pulse 76 01/31/23 1358   Resp 4 01/31/23 1358   SpO2 92 % 01/31/23 1358   Vitals shown include unvalidated device data.    Electronically Signed By: SHASHI HOROWITZ  January 31, 2023  1:59 PM

## 2023-01-31 NOTE — LETTER
February 7, 2023      Jim Brown  [id number]   [place] [address]        Dear ,    We are writing to inform you of your test results.     Allergic Fungal Sinusitis (AFS) was discussed with the patient and family over the telephone on 02/07/2023. AFS is not an invasive fungal infection but an allergic response to the presence of fungus in the sinuses. We are all exposed to fungus which is present in the air and the soil.  However, patients whom have atopy (a genetic predisposition to develop allergies)  tend to react to this fungus. The swelling and secretions is an allergic response, resulting in thick mucin and nasal polyps.  Common fungi belong to the Dematiaceaous family (Alternaria, Bipolaris, Curvularia species).    Treatment involves removing the allergic mucin through nasal irrigation and sinus surgery  Medical therapy includes chronic use of nasal saline irrigations, nasal and oral corticosteroids.  Patients may benefit from immunotherapy to treat allergies.  Antihistamines are frequently used  Anti-fungal treatment is generally not indicated as this has no proven benefit in improving the disease process with Allergic Fungal Sinusitis (AFS), and anti-fungal medications have serious potential side effects.     Resulted Orders   Anaerobic Bacterial Culture Routine   Result Value Ref Range    Culture 4+ Anaerobic diphtheroids (A)       Comment:      No further identification    Culture 1+ Prevotella melaninogenica (A)       Comment:      1st strain  Beta Lactamase Positive    Culture 1+ Prevotella buccae (A)       Comment:      2nd strain  Beta Lactamase negative   Gram Stain   Result Value Ref Range    Gram Stain Result 2+ PMNs/low power field (A)     Gram Stain Result 1+ Gram positive bacilli (A)     Gram Stain Result 1+ Gram positive cocci (A)    Tissue Aerobic Bacterial Culture Routine   Result Value Ref Range    Culture Culture in progress     Culture 2+ Gram negative  coccobacilli (A)       Comment:      No further identification  This organism is part of normal marvin.   Surgical Pathology Exam   Result Value Ref Range    Case Report       Surgical Pathology Report                         Case: [id number]                                  Authorizing Provider:  Lexy Novak MD   Collected:           01/31/2023 01:42 PM          Ordering Location:     [facility] Main Operating Room     Received:            02/01/2023 01:15 PM          Pathologist:           Ascencion Vo DO                                                         Specimen:    Sinus, right sinus contents                                                                Final Diagnosis       A.  Right endoscopic sinus surgery, bilateral turbinate reduction and septoplasty:  -Numerous fungal organisms present with morphology consistent with Aspergillus species.  -Fragments of sinonasal mucosa with mild patchy chronic inflammation consisting of lymphocytes and plasma cells.  -Few fragments of unremarkable cartilage and bone.      Clinical Information       Procedure:  Right Endoscopic Sinus Surgery with removal of right sphenoid sinus fungal ball - Right  Bilateral Turbinate Reduction, Septoplasty - Bilateral  Pre-op Diagnosis: Chronic pansinusitis [J32.4]  Deviated nasal septum [J34.2]  Nasal turbinate hypertrophy [J34.3]  Post-op Diagnosis: J32.4 - Chronic pansinusitis [ICD-10-CM]  J34.2 - Deviated nasal septum [ICD-10-CM]  J34.3 - Nasal turbinate hypertrophy [ICD-10-CM]      Gross Description       A(1). Sinus, right sinus contents:  The specimen is received in a container labeled with the patient's name, medical record number and right sinus contents.  In formalin and within a filtration bag are numerous tan-brown granular bits of tissue that measure in aggregate 3 x 2.5 x 0.3 cm.  All in 2 cassettes.      Microscopic Description       A microscopic examination was performed.      Case Images         If you have  any questions or concerns, please call the clinic at the number listed above.       Sincerely,      Lexy Novak MD

## 2023-01-31 NOTE — ANESTHESIA POSTPROCEDURE EVALUATION
Patient: Jim Brown    Procedure: Procedure(s):  Right Endoscopic Sinus Surgery with removal of right sphenoid sinus fungal ball  Bilateral Turbinate Reduction, Septoplasty       Anesthesia Type:  General    Note:  Disposition: Outpatient   Postop Pain Control: Uneventful            Sign Out: Well controlled pain   PONV: No   Neuro/Psych: Uneventful            Sign Out: Acceptable/Baseline neuro status   Airway/Respiratory: Uneventful            Sign Out: Acceptable/Baseline resp. status   CV/Hemodynamics: Uneventful            Sign Out: Acceptable CV status; No obvious hypovolemia; No obvious fluid overload   Other NRE: NONE   DID A NON-ROUTINE EVENT OCCUR? No           Last vitals:  Vitals Value Taken Time   /92 01/31/23 1445   Temp 97.3  F (36.3  C) 01/31/23 1445   Pulse 74 01/31/23 1446   Resp 6 01/31/23 1446   SpO2 96 % 01/31/23 1455   Vitals shown include unvalidated device data.    Electronically Signed By: GLORIA Angel CRNA  January 31, 2023  4:39 PM

## 2023-02-02 LAB
PATH REPORT.COMMENTS IMP SPEC: NORMAL
PATH REPORT.FINAL DX SPEC: NORMAL
PATH REPORT.GROSS SPEC: NORMAL
PATH REPORT.MICROSCOPIC SPEC OTHER STN: NORMAL
PATH REPORT.RELEVANT HX SPEC: NORMAL
PHOTO IMAGE: NORMAL

## 2023-02-06 ENCOUNTER — MYC MEDICAL ADVICE (OUTPATIENT)
Dept: OTOLARYNGOLOGY | Facility: OTHER | Age: 73
End: 2023-02-06

## 2023-02-06 LAB
BACTERIA TISS BX CULT: ABNORMAL

## 2023-02-07 ENCOUNTER — MYC MEDICAL ADVICE (OUTPATIENT)
Dept: FAMILY MEDICINE | Facility: OTHER | Age: 73
End: 2023-02-07

## 2023-02-08 ENCOUNTER — TRANSFERRED RECORDS (OUTPATIENT)
Dept: HEALTH INFORMATION MANAGEMENT | Facility: CLINIC | Age: 73
End: 2023-02-08

## 2023-02-08 LAB
BACTERIA TISS BX CULT: ABNORMAL

## 2023-02-20 NOTE — PROGRESS NOTES
"  Assessment & Plan     1. Hospital discharge follow-up  Notes reviewed     2. Atrial fibrillation with RVR (H)  Continue metoprolol and aspirin  Follow-up with cardiology as scheduled.   Could not tolerate eliquis - epistaxis for over 12 hour while hospitalized.      3. Acute pancreatitis, unspecified complication status, unspecified pancreatitis type  Referral to Dr Espinoza  - Adult GI  Referral - Consult Only; Future  - Amylase; Future  - Lipase; Future    4. Hyperlipidemia LDL goal <100  Continue lipitor    5. Chronic heart failure with preserved ejection fraction (HFpEF) (H)  - Comprehensive metabolic panel; Future    6. Pneumonia of right lower lobe due to infectious organism  - CBC with Platelets & Differential; Future  - CRP, inflammation; Future    7. Hypomagnesemia  - Magnesium; Future      Review of prior external note(s) from - CareEverywhere information from  reviewed       MED REC REQUIRED  Post Medication Reconciliation Status: discharge medications reconciled and changed, per note/orders  BMI:   Estimated body mass index is 28.09 kg/m  as calculated from the following:    Height as of this encounter: 1.854 m (6' 1\").    Weight as of this encounter: 96.6 kg (212 lb 14.4 oz).     Follow-up in 2 months or as needed     Izzy Wagner NP  [facility] - JUANCHO Fernandez is a 72 year old accompanied by his spouse, presenting for the following health issues:  Hospital F/U      HPI       Hospital Follow-up Visit:    Hospital/Nursing Home/IP Rehab Facility: [facility]   Date of Admission: 2/8/23  Date of Discharge: 2/15/23  Reason(s) for Admission: Atrial fibrillation with rapid ventricular response (HCC) (Primary Dx);   Hospital discharge follow-up;   Type 2 acute myocardial infarction (HCC);   Acute on chronic heart failure with preserved ejection fraction (HCC);   Pericardial effusion;   Acute pancreatitis without infection or necrosis, " "unspecified pancreatitis type     Was your hospitalization related to COVID-19? No   Problems taking medications regularly:  None  Medication changes since discharge: stopped simvastatin start atorvastatin 80 mg 81 mg asa imodium, metoprolol tartrate 50 mg bid, nitroglycerin, Protonix 40 mg, nasal spray and budesonide  nebulizer   Problems adhering to non-medication therapy:  None just questioning if should be taking 2 antacids is on famotidine  and Protonix wearing a heart monitor now.      Summary of hospitalization:  CareEverywhere information obtained and reviewed  Diagnostic Tests/Treatments reviewed.  Follow up needed: cardiology and referral to GI for pancreatitis.    Other Healthcare Providers Involved in Patient s Care:         Specialist appointment - cardiology and GI referral  Update since discharge: stable.     Plan of care communicated with patient and family     Overall he is doing \"ok\" he feels weak, has an intermittent dry cough.  Denies chest pain or shortness of breath, just weak.  He has been resting since discharge.  He morales have a follow-up with cardiology scheduled but needs a referral to GI.  Abdominal pain is improved.  Appetite is returning to base line but is taking it slow.          Review of Systems   Constitutional, HEENT, cardiovascular, pulmonary, gi and gu systems are negative, except as otherwise noted.      Objective    /68 (BP Location: Left arm, Patient Position: Chair, Cuff Size: Adult Regular)   Pulse 77   Temp 97.9  F (36.6  C) (Tympanic)   Resp 20   Ht 1.854 m (6' 1\")   Wt 96.6 kg (212 lb 14.4 oz)   SpO2 98%   BMI 28.09 kg/m    Body mass index is 28.09 kg/m .  Physical Exam   GENERAL: alert, no distress and fatigued  NECK: no adenopathy, no asymmetry, masses, or scars and thyroid normal to palpation  RESP: lungs clear to auscultation - no rales, rhonchi or wheezes  CV: regular rate and rhythm, normal S1 S2, no S3 or S4, no murmur, click or rub, no peripheral edema " and peripheral pulses strong  MS: no gross musculoskeletal defects noted, no edema  PSYCH: mentation appears normal, affect normal/bright

## 2023-02-21 ENCOUNTER — OFFICE VISIT (OUTPATIENT)
Dept: FAMILY MEDICINE | Facility: OTHER | Age: 73
End: 2023-02-21
Attending: NURSE PRACTITIONER
Payer: COMMERCIAL

## 2023-02-21 VITALS
RESPIRATION RATE: 20 BRPM | DIASTOLIC BLOOD PRESSURE: 68 MMHG | BODY MASS INDEX: 28.22 KG/M2 | WEIGHT: 212.9 LBS | OXYGEN SATURATION: 98 % | HEART RATE: 77 BPM | HEIGHT: 73 IN | TEMPERATURE: 97.9 F | SYSTOLIC BLOOD PRESSURE: 100 MMHG

## 2023-02-21 DIAGNOSIS — I48.91 ATRIAL FIBRILLATION WITH RVR (H): ICD-10-CM

## 2023-02-21 DIAGNOSIS — I50.32 CHRONIC HEART FAILURE WITH PRESERVED EJECTION FRACTION (HFPEF) (H): ICD-10-CM

## 2023-02-21 DIAGNOSIS — Z09 HOSPITAL DISCHARGE FOLLOW-UP: Primary | ICD-10-CM

## 2023-02-21 DIAGNOSIS — E78.5 HYPERLIPIDEMIA LDL GOAL <100: ICD-10-CM

## 2023-02-21 DIAGNOSIS — K85.90 ACUTE PANCREATITIS, UNSPECIFIED COMPLICATION STATUS, UNSPECIFIED PANCREATITIS TYPE: ICD-10-CM

## 2023-02-21 DIAGNOSIS — E83.42 HYPOMAGNESEMIA: ICD-10-CM

## 2023-02-21 DIAGNOSIS — J18.9 PNEUMONIA OF RIGHT LOWER LOBE DUE TO INFECTIOUS ORGANISM: ICD-10-CM

## 2023-02-21 LAB
ALBUMIN SERPL BCG-MCNC: 3.2 G/DL (ref 3.5–5.2)
ALP SERPL-CCNC: 74 U/L (ref 40–129)
ALT SERPL W P-5'-P-CCNC: 20 U/L (ref 10–50)
AMYLASE SERPL-CCNC: 94 U/L (ref 28–100)
ANION GAP SERPL CALCULATED.3IONS-SCNC: 11 MMOL/L (ref 7–15)
AST SERPL W P-5'-P-CCNC: 27 U/L (ref 10–50)
BASOPHILS # BLD AUTO: 0 10E3/UL (ref 0–0.2)
BASOPHILS NFR BLD AUTO: 1 %
BILIRUB SERPL-MCNC: 0.3 MG/DL
BUN SERPL-MCNC: 12.6 MG/DL (ref 8–23)
CALCIUM SERPL-MCNC: 9.3 MG/DL (ref 8.8–10.2)
CHLORIDE SERPL-SCNC: 99 MMOL/L (ref 98–107)
CREAT SERPL-MCNC: 1.03 MG/DL (ref 0.67–1.17)
CRP SERPL-MCNC: 19.81 MG/L
DEPRECATED HCO3 PLAS-SCNC: 25 MMOL/L (ref 22–29)
EOSINOPHIL # BLD AUTO: 0 10E3/UL (ref 0–0.7)
EOSINOPHIL NFR BLD AUTO: 0 %
ERYTHROCYTE [DISTWIDTH] IN BLOOD BY AUTOMATED COUNT: 13.1 % (ref 10–15)
GFR SERPL CREATININE-BSD FRML MDRD: 77 ML/MIN/1.73M2
GLUCOSE SERPL-MCNC: 117 MG/DL (ref 70–99)
HCT VFR BLD AUTO: 33.5 % (ref 40–53)
HGB BLD-MCNC: 11.1 G/DL (ref 13.3–17.7)
LIPASE SERPL-CCNC: 69 U/L (ref 13–60)
LYMPHOCYTES # BLD AUTO: 1.5 10E3/UL (ref 0.8–5.3)
LYMPHOCYTES NFR BLD AUTO: 18 %
MAGNESIUM SERPL-MCNC: 1.9 MG/DL (ref 1.7–2.3)
MCH RBC QN AUTO: 31.1 PG (ref 26.5–33)
MCHC RBC AUTO-ENTMCNC: 33.1 G/DL (ref 31.5–36.5)
MCV RBC AUTO: 94 FL (ref 78–100)
MONOCYTES # BLD AUTO: 0.7 10E3/UL (ref 0–1.3)
MONOCYTES NFR BLD AUTO: 9 %
NEUTROPHILS # BLD AUTO: 5.9 10E3/UL (ref 1.6–8.3)
NEUTROPHILS NFR BLD AUTO: 72 %
PLATELET # BLD AUTO: 566 10E3/UL (ref 150–450)
POTASSIUM SERPL-SCNC: 4.2 MMOL/L (ref 3.4–5.3)
PROT SERPL-MCNC: 6.7 G/DL (ref 6.4–8.3)
RBC # BLD AUTO: 3.57 10E6/UL (ref 4.4–5.9)
SODIUM SERPL-SCNC: 135 MMOL/L (ref 136–145)
WBC # BLD AUTO: 8.2 10E3/UL (ref 4–11)

## 2023-02-21 PROCEDURE — 85025 COMPLETE CBC W/AUTO DIFF WBC: CPT | Mod: ZL | Performed by: NURSE PRACTITIONER

## 2023-02-21 PROCEDURE — 36415 COLL VENOUS BLD VENIPUNCTURE: CPT | Mod: ZL | Performed by: NURSE PRACTITIONER

## 2023-02-21 PROCEDURE — 99214 OFFICE O/P EST MOD 30 MIN: CPT | Mod: 24 | Performed by: NURSE PRACTITIONER

## 2023-02-21 PROCEDURE — 82150 ASSAY OF AMYLASE: CPT | Mod: ZL | Performed by: NURSE PRACTITIONER

## 2023-02-21 PROCEDURE — G0463 HOSPITAL OUTPT CLINIC VISIT: HCPCS

## 2023-02-21 PROCEDURE — 83690 ASSAY OF LIPASE: CPT | Mod: ZL | Performed by: NURSE PRACTITIONER

## 2023-02-21 PROCEDURE — 83735 ASSAY OF MAGNESIUM: CPT | Mod: ZL | Performed by: NURSE PRACTITIONER

## 2023-02-21 PROCEDURE — 99496 TRANSJ CARE MGMT HIGH F2F 7D: CPT | Performed by: NURSE PRACTITIONER

## 2023-02-21 PROCEDURE — 80053 COMPREHEN METABOLIC PANEL: CPT | Mod: ZL | Performed by: NURSE PRACTITIONER

## 2023-02-21 PROCEDURE — 86140 C-REACTIVE PROTEIN: CPT | Mod: ZL | Performed by: NURSE PRACTITIONER

## 2023-02-21 RX ORDER — METOPROLOL TARTRATE 50 MG
25 TABLET ORAL 2 TIMES DAILY
COMMUNITY
Start: 2023-02-15 | End: 2023-04-27

## 2023-02-21 RX ORDER — NITROGLYCERIN 0.4 MG/1
TABLET SUBLINGUAL
COMMUNITY
Start: 2023-02-15

## 2023-02-21 RX ORDER — LOPERAMIDE HCL 2 MG
CAPSULE ORAL
COMMUNITY
Start: 2023-02-15 | End: 2023-03-30

## 2023-02-21 RX ORDER — FAMOTIDINE 20 MG/1
1 TABLET, FILM COATED ORAL 2 TIMES DAILY
COMMUNITY
Start: 2023-02-15 | End: 2023-02-21

## 2023-02-21 RX ORDER — ATORVASTATIN CALCIUM 80 MG/1
TABLET, FILM COATED ORAL
COMMUNITY
Start: 2023-02-15 | End: 2024-02-02

## 2023-02-21 ASSESSMENT — PAIN SCALES - GENERAL: PAINLEVEL: NO PAIN (0)

## 2023-02-23 PROBLEM — I51.89 DYNAMIC LEFT VENTRICULAR OUTFLOW OBSTRUCTION: Status: ACTIVE | Noted: 2023-02-09

## 2023-02-23 PROBLEM — A41.9 SEVERE SEPSIS WITHOUT SEPTIC SHOCK (H): Status: ACTIVE | Noted: 2023-02-08

## 2023-02-23 PROBLEM — I21.A1 TYPE 2 ACUTE MYOCARDIAL INFARCTION (H): Status: ACTIVE | Noted: 2023-02-13

## 2023-02-23 PROBLEM — I25.84 CORONARY ATHEROSCLEROSIS DUE TO CALCIFIED CORONARY LESION: Status: ACTIVE | Noted: 2023-02-07

## 2023-02-23 PROBLEM — J18.9 RLL PNEUMONIA: Status: ACTIVE | Noted: 2023-02-08

## 2023-02-23 PROBLEM — B44.9: Status: ACTIVE | Noted: 2023-02-09

## 2023-02-23 PROBLEM — I70.90 ATHEROSCLEROSIS: Status: ACTIVE | Noted: 2023-02-07

## 2023-02-23 PROBLEM — J96.01 ACUTE RESPIRATORY FAILURE WITH HYPOXIA (H): Status: ACTIVE | Noted: 2023-02-11

## 2023-02-23 PROBLEM — R79.89 ELEVATED TROPONIN I LEVEL: Status: ACTIVE | Noted: 2023-02-08

## 2023-02-23 PROBLEM — I25.10 CORONARY ATHEROSCLEROSIS DUE TO CALCIFIED CORONARY LESION: Status: ACTIVE | Noted: 2023-02-07

## 2023-02-23 PROBLEM — R04.0 EPISTAXIS: Status: ACTIVE | Noted: 2023-02-12

## 2023-02-23 PROBLEM — I48.91 ATRIAL FIBRILLATION WITH RAPID VENTRICULAR RESPONSE (H): Status: ACTIVE | Noted: 2023-02-10

## 2023-02-23 PROBLEM — R65.20 SEVERE SEPSIS WITHOUT SEPTIC SHOCK (H): Status: ACTIVE | Noted: 2023-02-08

## 2023-02-23 PROBLEM — I50.33 ACUTE ON CHRONIC HEART FAILURE WITH PRESERVED EJECTION FRACTION (H): Status: ACTIVE | Noted: 2023-02-13

## 2023-02-23 PROBLEM — J32.4 CHRONIC PANSINUSITIS: Status: ACTIVE | Noted: 2023-02-09

## 2023-03-01 LAB — BACTERIA TISS BX CULT: NO GROWTH

## 2023-03-02 ENCOUNTER — TRANSFERRED RECORDS (OUTPATIENT)
Dept: HEALTH INFORMATION MANAGEMENT | Facility: CLINIC | Age: 73
End: 2023-03-02

## 2023-03-02 LAB — EJECTION FRACTION: 69 %

## 2023-03-03 ENCOUNTER — NURSE TRIAGE (OUTPATIENT)
Dept: FAMILY MEDICINE | Facility: OTHER | Age: 73
End: 2023-03-03

## 2023-03-03 NOTE — TELEPHONE ENCOUNTER
Patient states fit bit alerted that HR is 48  Daily HR runs in the 50's  Heart attack last Feb 2023    Denies chest, shoulder, abd, jaw pain  Difficulty breathing    States feeling that a lot of discussion regarding his heart lately has made him hypervigilant    Current HR 55  Just finished lunch  Has nitroglycerin on hand    States really just wanting guidance d/t recent Heart attack  Writer stated since patient is not having pain, HR returned to his normal, able to eat & drink, no difficulty breathing, no addt'l complaints, he most likely does not need to present to ED/UC  Patient relayed understanding.  No further concerns at calls end.     Recommended ED/UC with any acute/rapid decline in condition.  Call back to the clinic with any further questions/concerns  Patient relayed understanding  No further concerns at calls end.

## 2023-03-22 ENCOUNTER — TRANSFERRED RECORDS (OUTPATIENT)
Dept: HEALTH INFORMATION MANAGEMENT | Facility: CLINIC | Age: 73
End: 2023-03-22

## 2023-03-22 LAB — EJECTION FRACTION: 65 %

## 2023-03-30 ENCOUNTER — OFFICE VISIT (OUTPATIENT)
Dept: FAMILY MEDICINE | Facility: OTHER | Age: 73
End: 2023-03-30
Attending: NURSE PRACTITIONER
Payer: COMMERCIAL

## 2023-03-30 VITALS
DIASTOLIC BLOOD PRESSURE: 70 MMHG | SYSTOLIC BLOOD PRESSURE: 114 MMHG | WEIGHT: 204 LBS | TEMPERATURE: 97 F | HEART RATE: 60 BPM | RESPIRATION RATE: 12 BRPM | HEIGHT: 73 IN | OXYGEN SATURATION: 98 % | BODY MASS INDEX: 27.04 KG/M2

## 2023-03-30 DIAGNOSIS — R41.89 CHANGE IN LEVEL OF CONSCIOUSNESS: Primary | ICD-10-CM

## 2023-03-30 PROCEDURE — 99213 OFFICE O/P EST LOW 20 MIN: CPT | Performed by: NURSE PRACTITIONER

## 2023-03-30 PROCEDURE — G0463 HOSPITAL OUTPT CLINIC VISIT: HCPCS

## 2023-03-30 ASSESSMENT — PAIN SCALES - GENERAL: PAINLEVEL: NO PAIN (0)

## 2023-03-30 NOTE — PROGRESS NOTES
"Otolaryngology Progress Note            With now confirmed allergic fungal sinusitis presents for their 2 month postoperative visit with me status post endoscopic sinus surgery     This is his first postoperative visit secondary to development of pancreatitis, acute coronary syndrome then sepsis about 2 weeks post op.  Unknown etiology.  Placed on heparin due to cardiac events, developed epistaxis which has resolved.      Jim is breathing well out of his nose and green states he stopped snoring  No further epistaxis  Not anticoagulated    He has not been using budesonide irrigations due to hospitalization        Final pathology showed numerous fungal organisms consistent with Aspergillus  No concerns with bony erosion    Procedures 1/31/23:    1.  Right sphenoidotomy with removal sphenoid sinus fungal ball  2.  Right total ethmoidectomy  3.  Right frontal sinusotomy  4.  Endoscopic septoplasty  5.  Bilateral submucous reduction inferior turbinates       Findings: Allergic fungal appearing debris filling the right sphenoid sinus  Evidence of prior partial anterior ethmoidectomy on the right  Evidence of prior septoplasty with residual right septal deviation    No heavy bleeding or pain  States nasal breathing is improved  Using budesonide irrigations    SNOT todays date 13 with a very mild problem with need to blow nose nasal blockage and rhinorrhea      Physical Exam  /68 (BP Location: Left arm, Patient Position: Sitting, Cuff Size: Adult Large)   Pulse 78   Temp 97.5  F (36.4  C) (Tympanic)   Ht 1.854 m (6' 1\")   Wt 90.7 kg (200 lb)   SpO2 95%   BMI 26.39 kg/m      General - The patient is well nourished and well developed, and appears to have good nutritional status.  Alert and oriented to person and place, interactive.  Head and Face - Normocephalic and atraumatic, with no gross asymmetry noted of the contour of the facial features.  The facial nerve is intact, with strong symmetric movements.  Eyes " - Extraocular movements intact.   Nose - Nasal mucosa is pink and moist with no abnormal mucus.  The septum was grossly midline, turbinates are without excess edema.  No polyps, masses, or purulence noted on examination.      To evaluate the nose and sinuses in the post operative state, I performed rigid nasal endoscopy. The nose was anesthetized with home afrin or topical lidocaine and neosynephrine in the office.    I began with the LEFT side using a 0 degree rigid nasal endoscope, and then similarly examined the RIGHT side    Findings:  Inferior turbinates:  Lateralized  Left: Is an 8 Marcos to remove normal secretions from the inferior turbinate and inferior middle meatus sphenoethmoid recess and nasopharynx clear  RIGHT:  I used a marcos suction to remove normal postoperative secretions from the ethmoid cavity and frontal recess  Right sphenoid clear  No fungal debris  Middle turbinate and middle meatus:  No purulence, no polyposis, no synechiae  The superior meatus and frontal recess are clear  The sphenoethmoid recess is clear  The nasopharynx is clear  Mucosa is healthy throughout without polyps nor polypoid degeneration    Impression/Plan    1. S/p FESS for right sphenoid fungal ball    Hold off on systemic steroids unless absolutely necessary and cleared by primary  Unknown etiology of acute pancreatitis in the postoperative period    Continue budesonide irrigations twice a day to right nares for 3 months  Use as needed to left nares    See Rupal or Patrica in 3 months  Abnormal liver  See me in 1 year

## 2023-03-30 NOTE — PROGRESS NOTES
"  Assessment & Plan     Change in level of consciousness  History of change in LOC November 2022. Patient requests second opinion from different neurology department.    - Adult Neurology  Referral       BMI:   Estimated body mass index is 26.91 kg/m  as calculated from the following:    Height as of this encounter: 1.854 m (6' 1\").    Weight as of this encounter: 92.5 kg (204 lb).         Return if symptoms worsen or fail to improve.    Hetal Selby, [facility] - [place] REGINO Fernandez is a 73 year old, presenting for the following health issues:  Recheck Medication  No flowsheet data found.  HPI     Medication Question  Pt reached out via [employer] on 3/28/23 and was directed to schedule this appointment. His note is copied below.     ------------------------  \"I would like to have a discussion with you about the choice to not take Keppra. I feel the side effects put way the  maybe  benefits as I have not had another episode. It wasn t really determined that it was a seizure anyway. If you could make an appointment for me to come in the next day or so I would appreciate it.\" - [employer]  ----------------------  Jim is most concerned with the potential for depression, personality changes, irritability, & aggression.     Jim had an episode associated with a change in level of consciousness and was seen  [facility] ED 11/9/22 for  what Jim calls \"a brain fart\". Jim recalls that he \"had about 3 episodes over about 10 days in November 2022. Has not had any since November. Episodes not associated with any drug use including THC. He reports rare use of THC gummies last summer on just a handful of occasions. Did not have any complications during us and zeb not used any since. No topical homeopathic creams or oral supplements such as CBD.     Jim is requesting a referral to neurology for a second opinion.      Review of Systems   Constitutional, HEENT, " cardiovascular, pulmonary, gi and gu systems are negative, except as otherwise noted.      -----------------------------------------------------  Abelardo Muse MD - 12/02/2022  8:15 AM CST   Formatting of this note is different from the original.       ROUTINE EEG     EEG number:  [id number]   Technician:  Melissa BLUE/Lul Oviedo   Referring provider:  Abelardo Muse     Clinical summary:   The patient is a 72-year-old man undergoing EEG to evaluate for epilepsy in the setting of spells involving staring and unresponsiveness.  He additionally has olfactory hallucinations.  He is not on any centrally acting medications at the time of the EEG.     Technical summary:   This was a 21-channel EEG recording that was acquired using the 10-20 nomenclature system of electrode placement.     Findings/results:   The background activity in a quiet waking state consists of a symmetric, 10 hz posterior dominant rhythm.  The posterior dominant rhythm attenuates with eye opening.    Intermittent left frontotemporal slowing is seen during waking. Hyperventilation and photic stimulation do not elicit any epileptic abnormalities.    Slowing of the background rhythm is seen during drowsiness.  Sleep was not captured.     Interictal:   Multiple epileptiform discharges in the form of sharp waves with after going slow wave were seen with maximum at F7 and T3.     Ictal:   No clinical spells were captured. No electrographic seizures were observed.     Summary:   This routine EEG is abnormal due to the presence of focal slowing and epileptiform activity in the left frontotemporal region.  No electrographic seizures or clinical spells were captured.     Interpretation:   This abnormal routine EEG shows evidence of cortical dysfunction and cortical irritability in the left frontotemporal region.  This indicates an elevated risk for seizures.  No seizures or clinical spells were captured.  Clinical correlation is advised.     Abelardo CURRY  "MD Micha   1/4/2023      ------------------------------------------------------------------------  11/16/2022  EXAM:  MR BRAIN W/O & W CONTRAST     HISTORY:  Change in level of consciousness.     TECHNIQUE:  Multiplanar, multisequence MR imaging of the head obtained  prior to, and after, intravenous contrast administration     MEDS/CONTRAST: Gadavist   10 mL     COMPARISON:  CT head 3/2/2020; MRI head 2/26/2019      FINDINGS:    There is no mass effect, midline shift or evidence of acute  intracranial hemorrhage. The ventricles are proportionate to the  cerebral sulci. A few scattered foci of T2 hyperintense FLAIR signal  in the periventricular and supraventricular white matter, which is  nonspecific, and within normal limits for age. Normal major  intracranial vascular flow voids.     Postcontrast images demonstrate no abnormal intracranial enhancement.     No suspicious abnormality of the skull marrow signal. Continued  complete opacification right sphenoid sinus with anterior sphenoid  sinus polypoid mucosal focus appearing to obstruct the right  sphenoethmoidal recess. Bilateral mastoid effusions. The orbits are  grossly unremarkable.                                                                      IMPRESSION:  1. No focal lesion or structural abnormality to explain patient's  symptoms.  2. Unchanged complete opacification of the right sphenoid sinus,  suspected secondary to chronic obstruction from mucous retention cyst  or mucosal polyp.     LYLY NGUYEN MD   ----------------------------------------------------------      Objective    /70 (BP Location: Right arm, Patient Position: Sitting, Cuff Size: Adult Regular)   Pulse 60   Temp 97  F (36.1  C) (Tympanic)   Resp 12   Ht 1.854 m (6' 1\")   Wt 92.5 kg (204 lb)   SpO2 98%   BMI 26.91 kg/m    Body mass index is 26.91 kg/m .  Physical Exam       GENERAL: healthy, alert and no distress  EYES: Eyes grossly normal to inspection, PERRL and " conjunctivae and sclerae normal  HENT: ear canals and TM's normal, nose and mouth without ulcers or lesions  RESP: lungs clear to auscultation - no rales, rhonchi or wheezes  CV: regular rate and rhythm, normal S1 S2, no S3 or S4, no murmur, click or rub, no peripheral edema and peripheral pulses strong

## 2023-04-03 ENCOUNTER — OFFICE VISIT (OUTPATIENT)
Dept: OTOLARYNGOLOGY | Facility: OTHER | Age: 73
End: 2023-04-03
Attending: OTOLARYNGOLOGY
Payer: MEDICARE

## 2023-04-03 VITALS
SYSTOLIC BLOOD PRESSURE: 112 MMHG | BODY MASS INDEX: 26.51 KG/M2 | WEIGHT: 200 LBS | DIASTOLIC BLOOD PRESSURE: 68 MMHG | HEIGHT: 73 IN | TEMPERATURE: 97.5 F | OXYGEN SATURATION: 95 % | HEART RATE: 78 BPM

## 2023-04-03 DIAGNOSIS — Z98.890 S/P FESS (FUNCTIONAL ENDOSCOPIC SINUS SURGERY): Primary | ICD-10-CM

## 2023-04-03 DIAGNOSIS — B47.0 FUNGAL MYCETOMA: ICD-10-CM

## 2023-04-03 PROCEDURE — 31237 NSL/SINS NDSC SURG BX POLYPC: CPT | Performed by: OTOLARYNGOLOGY

## 2023-04-03 PROCEDURE — 31231 NASAL ENDOSCOPY DX: CPT | Performed by: OTOLARYNGOLOGY

## 2023-04-03 PROCEDURE — G0463 HOSPITAL OUTPT CLINIC VISIT: HCPCS | Mod: 25

## 2023-04-03 PROCEDURE — 99024 POSTOP FOLLOW-UP VISIT: CPT | Performed by: OTOLARYNGOLOGY

## 2023-04-03 RX ORDER — LEVETIRACETAM 500 MG/1
500 TABLET ORAL 2 TIMES DAILY
COMMUNITY

## 2023-04-03 ASSESSMENT — PAIN SCALES - GENERAL: PAINLEVEL: NO PAIN (0)

## 2023-04-03 NOTE — LETTER
"    4/3/2023         RE: Jim Brown  [address]  [address]  [facility] [place] [address]        Dear Colleague,    Thank you for referring your patient, Jim Brown, to the [facility]. Please see a copy of my visit note below.    Otolaryngology Progress Note            With now confirmed allergic fungal sinusitis presents for their 2 month postoperative visit with me status post endoscopic sinus surgery     This is his first postoperative visit secondary to development of pancreatitis, acute coronary syndrome then sepsis about 2 weeks post op.  Unknown etiology.  Placed on heparin due to cardiac events, developed epistaxis which has resolved.      Jim is breathing well out of his nose and green states he stopped snoring  No further epistaxis  Not anticoagulated    He has not been using budesonide irrigations due to hospitalization        Final pathology showed numerous fungal organisms consistent with Aspergillus  No concerns with bony erosion    Procedures 1/31/23:    1.  Right sphenoidotomy with removal sphenoid sinus fungal ball  2.  Right total ethmoidectomy  3.  Right frontal sinusotomy  4.  Endoscopic septoplasty  5.  Bilateral submucous reduction inferior turbinates       Findings: Allergic fungal appearing debris filling the right sphenoid sinus  Evidence of prior partial anterior ethmoidectomy on the right  Evidence of prior septoplasty with residual right septal deviation    No heavy bleeding or pain  States nasal breathing is improved  Using budesonide irrigations    SNOT todays date 13 with a very mild problem with need to blow nose nasal blockage and rhinorrhea      Physical Exam  /68 (BP Location: Left arm, Patient Position: Sitting, Cuff Size: Adult Large)   Pulse 78   Temp 97.5  F (36.4  C) (Tympanic)   Ht 1.854 m (6' 1\")   Wt 90.7 kg (200 lb)   SpO2 95%   BMI 26.39 kg/m      General - The patient is well nourished and well developed, and appears to have good " nutritional status.  Alert and oriented to person and place, interactive.  Head and Face - Normocephalic and atraumatic, with no gross asymmetry noted of the contour of the facial features.  The facial nerve is intact, with strong symmetric movements.  Eyes - Extraocular movements intact.   Nose - Nasal mucosa is pink and moist with no abnormal mucus.  The septum was grossly midline, turbinates are without excess edema.  No polyps, masses, or purulence noted on examination.      To evaluate the nose and sinuses in the post operative state, I performed rigid nasal endoscopy. The nose was anesthetized with home afrin or topical lidocaine and neosynephrine in the office.    I began with the LEFT side using a 0 degree rigid nasal endoscope, and then similarly examined the RIGHT side    Findings:  Inferior turbinates:  Lateralized  Left: Is an 8 Marcos to remove normal secretions from the inferior turbinate and inferior middle meatus sphenoethmoid recess and nasopharynx clear  RIGHT:  I used a marcos suction to remove normal postoperative secretions from the ethmoid cavity and frontal recess  Right sphenoid clear  No fungal debris  Middle turbinate and middle meatus:  No purulence, no polyposis, no synechiae  The superior meatus and frontal recess are clear  The sphenoethmoid recess is clear  The nasopharynx is clear  Mucosa is healthy throughout without polyps nor polypoid degeneration    Impression/Plan    1. S/p FESS for right sphenoid fungal ball    Hold off on systemic steroids unless absolutely necessary and cleared by primary  Unknown etiology of acute pancreatitis in the postoperative period    Continue budesonide irrigations twice a day to right nares for 3 months  Use as needed to left nares    See Rupal or Patrica in 3 months  Abnormal liver  See me in 1 year        Again, thank you for allowing me to participate in the care of your patient.        Sincerely,        Lexy Novak MD

## 2023-04-03 NOTE — PATIENT INSTRUCTIONS
Continue budesonide irrigations twice a day to right nares for 3 months  Use as needed to left nares    See Rupal Burciaga in 3 months    See Dr. Novak in 1 year

## 2023-04-05 ENCOUNTER — MYC MEDICAL ADVICE (OUTPATIENT)
Dept: FAMILY MEDICINE | Facility: OTHER | Age: 73
End: 2023-04-05

## 2023-04-25 NOTE — PROGRESS NOTES
Assessment & Plan     1. Hyperlipidemia LDL goal <100  Continue lipitor  - Comprehensive metabolic panel; Future  - Lipid Profile; Future  - TSH with free T4 reflex; Future    2. Hypertensive heart disease with congestive heart failure, unspecified heart failure type (H)  Cardiology notes reviewed   - metoprolol tartrate (LOPRESSOR) 25 MG tablet; Take 1 tablet (25 mg) by mouth 2 times daily  Dispense: 180 tablet; Refill: 1    3. Atrial fibrillation with rapid ventricular response (H)  Resolved     4. Coronary atherosclerosis due to calcified coronary lesion  Continue exercise     5. Seizure disorder (H)  Continue Keppra, neurology notes reviewed     6. Hypomagnesemia  Continue magnesium     7. On statin therapy  - Lipid Profile; Future    8. Reactive depression  Start 5HPT supplement     9. Gastroesophageal reflux disease with esophagitis without hemorrhage  Continue pepcid.          Review of prior external note(s) from - CareEverywhere information from [facility] cardiology and neurology notes reviewed.  reviewed         Return in about 3 months (around 7/27/2023) for hypertension and lipids, anxiety/depression.    Izzy Wagner NP  [facility] - [place] REGINO Fernandez is a 73 year old, presenting for the following health issues:  Hypertension, Lipids, Atrial Fib (/), Vascular Disease, and Heart Problem         View : No data to display.              HPI     Hyperlipidemia Follow-Up      Are you regularly taking any medication or supplement to lower your cholesterol?   Yes- Lipitor     Are you having muscle aches or other side effects that you think could be caused by your cholesterol lowering medication?  No    Hypertension Follow-up      Do you check your blood pressure regularly outside of the clinic? No     Are you following a low salt diet? Yes    Are your blood pressures ever more than 140 on the top number (systolic) OR more   than 90 on the bottom number (diastolic), for  example 140/90? No    Atrial Fibrillation Follow-up      Symptoms: no recent chest pain, significant palpitations, dizziness/lightheadedness, dyspnea, or increased peripheral edema.    Stroke prevention: None     Resolved, eliquis stopped by cardiology.          1/31/2023     3:15 PM 2/21/2023    10:06 AM 3/30/2023    12:53 PM 4/3/2023     8:47 AM 4/27/2023     8:04 AM   Date   Pulse 76 77 60 78 58       Vascular Disease Follow-up      How often do you take nitroglycerin? Never    Do you take an aspirin every day? Yes    Heart Failure Follow-up    Are you experiencing any shortness of breath? No    Are you experiencing any swelling in your legs or feet?  No    Are you using more pillows than usual? No    Do you cough at night?  No    Do you check your weight daily?  Yes    Have you had a weight change recently?  No    Are you having any of the following side effects from your medications? (Select all that apply)  The patient does not report symptoms of dizziness, fatigue, cough, swelling, or slow heart beat.    Since your last visit, how many times have you gone to the cardiologist, urgent care, emergency room, or hospital because of your heart failure?   1 time      Seizure disorder:  Was started on keppra but neurology.   He is taking it, but is not happy about it.  Was advised by neurology if he did not take it he would not be able to drive.  No seizures since hospitalization.        Recent Labs   Lab Test 02/21/23  1134 01/24/23  1106 11/10/22  1420 10/24/22  0848 08/04/22  1022 04/20/22  0858 11/03/21  1357 11/03/21  1357 04/29/21  1534 08/25/20  1634   A1C  --   --  5.4  --   --   --   --   --   --   --    LDL  --   --   --  73  --  80  --  70 86 87   HDL  --   --   --  58  --  60  --  58 66 53   TRIG  --   --   --  56  --  53  --  86 48 52   ALT 20 12  --  15   < > 16   < > 20 18  --    CR 1.03 0.89  --  0.91   < > 0.91   < > 0.94 0.82 0.76   GFRESTIMATED 77 >90  --  90   < > 90   < > 81 89 >90   GFRESTBLACK   "--   --   --   --   --   --   --   --  >90 >90   POTASSIUM 4.2 4.4  --  4.1   < > 3.9   < > 3.8 4.1 3.9   TSH  --   --   --  1.78  --  1.51   < > 1.48 1.53 1.57    < > = values in this interval not displayed.      BP Readings from Last 3 Encounters:   04/27/23 108/64   04/03/23 112/68   03/30/23 114/70    Wt Readings from Last 3 Encounters:   04/27/23 93.6 kg (206 lb 6.4 oz)   04/03/23 90.7 kg (200 lb)   03/30/23 92.5 kg (204 lb)                Review of Systems   Constitutional, HEENT, cardiovascular, pulmonary, gi and gu systems are negative, except as otherwise noted.      Objective    /64 (BP Location: Left arm, Patient Position: Chair, Cuff Size: Adult Large)   Pulse 58   Temp 97.1  F (36.2  C) (Tympanic)   Resp 18   Ht 1.854 m (6' 1\")   Wt 93.6 kg (206 lb 6.4 oz)   SpO2 98%   BMI 27.23 kg/m    Body mass index is 27.23 kg/m .  Physical Exam   GENERAL: healthy, alert and no distress  NECK: no adenopathy, no asymmetry, masses, or scars, thyroid normal to palpation and no carotid bruits  RESP: lungs clear to auscultation - no rales, rhonchi or wheezes  CV: regular rate and rhythm, normal S1 S2, no S3 or S4, no murmur, click or rub, no peripheral edema and peripheral pulses strong  MS: no gross musculoskeletal defects noted, no edema  PSYCH: mentation appears normal, affect normal/bright                    "

## 2023-04-27 ENCOUNTER — OFFICE VISIT (OUTPATIENT)
Dept: FAMILY MEDICINE | Facility: OTHER | Age: 73
End: 2023-04-27
Attending: NURSE PRACTITIONER
Payer: COMMERCIAL

## 2023-04-27 VITALS
SYSTOLIC BLOOD PRESSURE: 108 MMHG | DIASTOLIC BLOOD PRESSURE: 64 MMHG | HEART RATE: 58 BPM | HEIGHT: 73 IN | WEIGHT: 206.4 LBS | OXYGEN SATURATION: 98 % | BODY MASS INDEX: 27.35 KG/M2 | RESPIRATION RATE: 18 BRPM | TEMPERATURE: 97.1 F

## 2023-04-27 DIAGNOSIS — K21.00 GASTROESOPHAGEAL REFLUX DISEASE WITH ESOPHAGITIS WITHOUT HEMORRHAGE: ICD-10-CM

## 2023-04-27 DIAGNOSIS — G40.909 SEIZURE DISORDER (H): ICD-10-CM

## 2023-04-27 DIAGNOSIS — Z79.899 ON STATIN THERAPY: ICD-10-CM

## 2023-04-27 DIAGNOSIS — F32.9 REACTIVE DEPRESSION: ICD-10-CM

## 2023-04-27 DIAGNOSIS — I48.91 ATRIAL FIBRILLATION WITH RAPID VENTRICULAR RESPONSE (H): ICD-10-CM

## 2023-04-27 DIAGNOSIS — I11.0 HYPERTENSIVE HEART DISEASE WITH CONGESTIVE HEART FAILURE, UNSPECIFIED HEART FAILURE TYPE (H): ICD-10-CM

## 2023-04-27 DIAGNOSIS — E78.5 HYPERLIPIDEMIA LDL GOAL <100: Primary | ICD-10-CM

## 2023-04-27 DIAGNOSIS — E83.42 HYPOMAGNESEMIA: ICD-10-CM

## 2023-04-27 DIAGNOSIS — I25.84 CORONARY ATHEROSCLEROSIS DUE TO CALCIFIED CORONARY LESION: ICD-10-CM

## 2023-04-27 DIAGNOSIS — I25.10 CORONARY ATHEROSCLEROSIS DUE TO CALCIFIED CORONARY LESION: ICD-10-CM

## 2023-04-27 LAB
ALBUMIN SERPL BCG-MCNC: 4 G/DL (ref 3.5–5.2)
ALP SERPL-CCNC: 74 U/L (ref 40–129)
ALT SERPL W P-5'-P-CCNC: 16 U/L (ref 10–50)
ANION GAP SERPL CALCULATED.3IONS-SCNC: 10 MMOL/L (ref 7–15)
AST SERPL W P-5'-P-CCNC: 20 U/L (ref 10–50)
BILIRUB SERPL-MCNC: 0.3 MG/DL
BUN SERPL-MCNC: 14.3 MG/DL (ref 8–23)
CALCIUM SERPL-MCNC: 9.2 MG/DL (ref 8.8–10.2)
CHLORIDE SERPL-SCNC: 101 MMOL/L (ref 98–107)
CHOLEST SERPL-MCNC: 103 MG/DL
CREAT SERPL-MCNC: 0.94 MG/DL (ref 0.67–1.17)
DEPRECATED HCO3 PLAS-SCNC: 26 MMOL/L (ref 22–29)
GFR SERPL CREATININE-BSD FRML MDRD: 86 ML/MIN/1.73M2
GLUCOSE SERPL-MCNC: 101 MG/DL (ref 70–99)
HDLC SERPL-MCNC: 46 MG/DL
HOLD SPECIMEN: NORMAL
LDLC SERPL CALC-MCNC: 46 MG/DL
NONHDLC SERPL-MCNC: 57 MG/DL
POTASSIUM SERPL-SCNC: 4.1 MMOL/L (ref 3.4–5.3)
PROT SERPL-MCNC: 7 G/DL (ref 6.4–8.3)
SODIUM SERPL-SCNC: 137 MMOL/L (ref 136–145)
TRIGL SERPL-MCNC: 57 MG/DL
TSH SERPL DL<=0.005 MIU/L-ACNC: 2.36 UIU/ML (ref 0.3–4.2)

## 2023-04-27 PROCEDURE — 84443 ASSAY THYROID STIM HORMONE: CPT | Mod: ZL | Performed by: NURSE PRACTITIONER

## 2023-04-27 PROCEDURE — 36415 COLL VENOUS BLD VENIPUNCTURE: CPT | Mod: ZL | Performed by: NURSE PRACTITIONER

## 2023-04-27 PROCEDURE — G0463 HOSPITAL OUTPT CLINIC VISIT: HCPCS

## 2023-04-27 PROCEDURE — 84478 ASSAY OF TRIGLYCERIDES: CPT | Mod: ZL | Performed by: NURSE PRACTITIONER

## 2023-04-27 PROCEDURE — 84132 ASSAY OF SERUM POTASSIUM: CPT | Mod: ZL | Performed by: NURSE PRACTITIONER

## 2023-04-27 PROCEDURE — 99214 OFFICE O/P EST MOD 30 MIN: CPT | Performed by: NURSE PRACTITIONER

## 2023-04-27 RX ORDER — FAMOTIDINE 20 MG/1
1 TABLET, FILM COATED ORAL 2 TIMES DAILY
COMMUNITY
Start: 2023-04-04 | End: 2023-04-27

## 2023-04-27 RX ORDER — METOPROLOL TARTRATE 25 MG/1
25 TABLET, FILM COATED ORAL 2 TIMES DAILY
Qty: 180 TABLET | Refills: 1 | COMMUNITY
Start: 2023-04-27 | End: 2023-11-01

## 2023-04-27 ASSESSMENT — PAIN SCALES - GENERAL: PAINLEVEL: NO PAIN (0)

## 2023-04-27 NOTE — PATIENT INSTRUCTIONS
Assessment & Plan     1. Hyperlipidemia LDL goal <100  Continue lipitor  - Comprehensive metabolic panel; Future  - Lipid Profile; Future  - TSH with free T4 reflex; Future    2. Hypertensive heart disease with congestive heart failure, unspecified heart failure type (H)  Cardiology notes reviewed   - metoprolol tartrate (LOPRESSOR) 25 MG tablet; Take 1 tablet (25 mg) by mouth 2 times daily  Dispense: 180 tablet; Refill: 1    3. Atrial fibrillation with rapid ventricular response (H)  Resolved     4. Coronary atherosclerosis due to calcified coronary lesion  Continue exercise     5. Seizure disorder (H)  Continue Keppra, neurology notes reviewed     6. Hypomagnesemia  Continue magnesium     7. On statin therapy  - Lipid Profile; Future    8. Reactive depression  Start 5HPT supplement       Review of prior external note(s) from - CareEverwhere information from [facility] cardiology and neurology notes reviewed.  reviewed         Return in about 3 months (around 7/27/2023) for hypertension and lipids, anxiety/depression.    Izzy Wagner NP  [facility]

## 2023-07-06 ENCOUNTER — OFFICE VISIT (OUTPATIENT)
Dept: OTOLARYNGOLOGY | Facility: OTHER | Age: 73
End: 2023-07-06
Attending: PHYSICIAN ASSISTANT
Payer: MEDICARE

## 2023-07-06 VITALS
WEIGHT: 202 LBS | HEART RATE: 53 BPM | TEMPERATURE: 96.6 F | DIASTOLIC BLOOD PRESSURE: 66 MMHG | OXYGEN SATURATION: 98 % | SYSTOLIC BLOOD PRESSURE: 112 MMHG | BODY MASS INDEX: 27.36 KG/M2 | HEIGHT: 72 IN

## 2023-07-06 DIAGNOSIS — H90.3 SENSORINEURAL HEARING LOSS, BILATERAL: ICD-10-CM

## 2023-07-06 DIAGNOSIS — B47.0 FUNGAL MYCETOMA: ICD-10-CM

## 2023-07-06 DIAGNOSIS — Z98.890 S/P FESS (FUNCTIONAL ENDOSCOPIC SINUS SURGERY): Primary | ICD-10-CM

## 2023-07-06 PROCEDURE — 31231 NASAL ENDOSCOPY DX: CPT | Performed by: PHYSICIAN ASSISTANT

## 2023-07-06 PROCEDURE — G0463 HOSPITAL OUTPT CLINIC VISIT: HCPCS | Mod: 25

## 2023-07-06 PROCEDURE — 99213 OFFICE O/P EST LOW 20 MIN: CPT | Mod: 25 | Performed by: PHYSICIAN ASSISTANT

## 2023-07-06 ASSESSMENT — PAIN SCALES - GENERAL: PAINLEVEL: NO PAIN (0)

## 2023-07-06 NOTE — PROGRESS NOTES
Chief Complaint   Patient presents with     RECHECK     Sinus check.  S/P right fess, septoplasty, and TR 1/31/23     Patient returns to ENT for sinus recheck. He was last seen on 4/3/23 by Dr. Novak. He was   Jim is breathing well thru his nose.   He has not been using Budesonide rinses. Reports last rinse was several weeks ago. He has no facial pain or pressure. Denies rhinitis or post nasal drainage.   No further epistaxis.   Mild seasonal allergies during spring but otherwise no concerns.     Reports stressors from family loss.     Final pathology showed numerous fungal organisms consistent with Aspergillus  No concerns with bony erosion     Procedures 1/31/23:    1.  Right sphenoidotomy with removal sphenoid sinus fungal ball  2.  Right total ethmoidectomy  3.  Right frontal sinusotomy  4.  Endoscopic septoplasty  5.  Bilateral submucous reduction inferior turbinates        Findings: Allergic fungal appearing debris filling the right sphenoid sinus  Evidence of prior partial anterior ethmoidectomy on the right  Evidence of prior septoplasty with residual right septal deviation      `patient reports hearing loss for several years and difficultly in conversations.     Past Medical History:   Diagnosis Date     Arthritis      Depressive disorder       No Known Allergies  Current Outpatient Medications   Medication     aspirin (ASA) 81 MG EC tablet     atorvastatin (LIPITOR) 80 MG tablet     budesonide (PULMICORT) 0.5 MG/2ML neb solution     famotidine (PEPCID) 20 MG tablet     levETIRAcetam (KEPPRA) 500 MG tablet     metoprolol tartrate (LOPRESSOR) 25 MG tablet     Multiple Vitamin (MULTI-VITAMINS) TABS     nitroGLYcerin (NITROSTAT) 0.4 MG sublingual tablet     Probiotic Product (PROBIOTIC-10 PO)     saline 0.65 % SOLN     sildenafil (VIAGRA) 100 MG tablet     No current facility-administered medications for this visit.     ROS- SEE HPI  /66 (BP Location: Right arm, Cuff Size: Adult Regular)   Pulse  53   Temp (!) 96.6  F (35.9  C) (Tympanic)   Ht 1.829 m (6')   Wt 91.6 kg (202 lb)   SpO2 98%   BMI 27.40 kg/m      General - The patient is well nourished and well developed, and appears to have good nutritional status.  Alert and oriented to person and place, interactive.  Head and Face - Normocephalic and atraumatic, with no gross asymmetry noted of the contour of the facial features.  The facial nerve is intact, with strong symmetric movements.  Eyes - Extraocular movements intact.   Ears- Bilateral EAC Clear. Bilateral TM's intact without effusion.   Nose - Nasal mucosa is pink and moist with no abnormal mucus.  The septum was grossly midline, turbinates are without excess edema.  No polyps, masses, or purulence noted on examination.        To evaluate the nose and sinuses in the post operative state, I performed rigid nasal endoscopy. The nose was anesthetized with home afrin or topical lidocaine and neosynephrine in the office.     I began with the LEFT side using a 0 degree rigid nasal endoscope, and then similarly examined the RIGHT side     Findings:  Inferior turbinates:  Lateralized  Left: Clear. No polyps .  RIGHT:  Sinuses appear good. No polyps or fungal changes.   Right sphenoid clear  No fungal debris  Middle turbinate and middle meatus:  No purulence, no polyposis, no synechiae  The superior meatus and frontal recess are clear  The sphenoethmoid recess is clear  The nasopharynx is clear  Mucosa is healthy throughout without polyps nor polypoid degeneration     Impression/Plan    ICD-10-CM    1. 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 Sensorineural hearing loss, bilateral  H90.3 Adult Audiology  Referral            Sinuses look well.   No polyps, edema or purulence visualized.     Follow up in 9 months with Dr. Novak.       He has hx of hearing loss.   Recommended audiogram and HAC.     Hearing preservation reinforced      Hearing loss is an independent risk factor for dementia and cognitive decline.  Hearing loss also leads to higher risk of falls  Mathew et all, Lancet. 2020     Importance of hearing aids reinforced        Rupal Dinero PA-C  ENT  [facility], [place]

## 2023-07-06 NOTE — LETTER
7/6/2023         RE: Jim Brown  [address]   [address] [place] [id number]        Dear Colleague,    Thank you for referring your patient, Jim Brown, to the [facility]. Please see a copy of my visit note below.    Chief Complaint   Patient presents with     RECHECK     Sinus check.  S/P right fess, septoplasty, and TR 1/31/23     Patient returns to ENT for sinus recheck. He was last seen on 4/3/23 by Dr. Novak. He was   Jim is breathing well thru his nose.   He has not been using Budesonide rinses. Reports last rinse was several weeks ago. He has no facial pain or pressure. Denies rhinitis or post nasal drainage.   No further epistaxis.   Mild seasonal allergies during spring but otherwise no concerns.     Reports stressors from family loss.     Final pathology showed numerous fungal organisms consistent with Aspergillus  No concerns with bony erosion     Procedures 1/31/23:    1.  Right sphenoidotomy with removal sphenoid sinus fungal ball  2.  Right total ethmoidectomy  3.  Right frontal sinusotomy  4.  Endoscopic septoplasty  5.  Bilateral submucous reduction inferior turbinates        Findings: Allergic fungal appearing debris filling the right sphenoid sinus  Evidence of prior partial anterior ethmoidectomy on the right  Evidence of prior septoplasty with residual right septal deviation      `patient reports hearing loss for several years and difficultly in conversations.     Past Medical History:   Diagnosis Date     Arthritis      Depressive disorder       No Known Allergies  Current Outpatient Medications   Medication     aspirin (ASA) 81 MG EC tablet     atorvastatin (LIPITOR) 80 MG tablet     budesonide (PULMICORT) 0.5 MG/2ML neb solution     famotidine (PEPCID) 20 MG tablet     levETIRAcetam (KEPPRA) 500 MG tablet     metoprolol tartrate (LOPRESSOR) 25 MG tablet     Multiple Vitamin (MULTI-VITAMINS) TABS     nitroGLYcerin (NITROSTAT) 0.4 MG sublingual  tablet     Probiotic Product (PROBIOTIC-10 PO)     saline 0.65 % SOLN     sildenafil (VIAGRA) 100 MG tablet     No current facility-administered medications for this visit.     ROS- SEE HPI  /66 (BP Location: Right arm, Cuff Size: Adult Regular)   Pulse 53   Temp (!) 96.6  F (35.9  C) (Tympanic)   Ht 1.829 m (6')   Wt 91.6 kg (202 lb)   SpO2 98%   BMI 27.40 kg/m      General - The patient is well nourished and well developed, and appears to have good nutritional status.  Alert and oriented to person and place, interactive.  Head and Face - Normocephalic and atraumatic, with no gross asymmetry noted of the contour of the facial features.  The facial nerve is intact, with strong symmetric movements.  Eyes - Extraocular movements intact.   Ears- Bilateral EAC Clear. Bilateral TM's intact without effusion.   Nose - Nasal mucosa is pink and moist with no abnormal mucus.  The septum was grossly midline, turbinates are without excess edema.  No polyps, masses, or purulence noted on examination.        To evaluate the nose and sinuses in the post operative state, I performed rigid nasal endoscopy. The nose was anesthetized with home afrin or topical lidocaine and neosynephrine in the office.     I began with the LEFT side using a 0 degree rigid nasal endoscope, and then similarly examined the RIGHT side     Findings:  Inferior turbinates:  Lateralized  Left: Clear. No polyps .  RIGHT:  Sinuses appear good. No polyps or fungal changes.   Right sphenoid clear  No fungal debris  Middle turbinate and middle meatus:  No purulence, no polyposis, no synechiae  The superior meatus and frontal recess are clear  The sphenoethmoid recess is clear  The nasopharynx is clear  Mucosa is healthy throughout without polyps nor polypoid degeneration     Impression/Plan    ICD-10-CM    1. 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 Sensorineural hearing loss, bilateral  H90.3  Adult Audiology  Referral            Sinuses look well.   No polyps, edema or purulence visualized.     Follow up in 9 months with Dr. Novak.       He has hx of hearing loss.   Recommended audiogram and HAC.     Hearing preservation reinforced     Hearing loss is an independent risk factor for dementia and cognitive decline.  Hearing loss also leads to higher risk of falls  Carmona et all, Lancet. 2020     Importance of hearing aids reinforced        Rupal Dinero PA-C  ENT  [facility], [place]          Again, thank you for allowing me to participate in the care of your patient.        Sincerely,        Rupal Dinero PA-C

## 2023-07-24 NOTE — PROGRESS NOTES
Assessment & Plan     1. Hyperlipidemia LDL goal <100  Continue lipitor  - Lipid Profile; Future  - TSH with free T4 reflex; Future    2. Hypertensive heart disease with congestive heart failure, unspecified heart failure type (H)  Well controlled   - Comprehensive metabolic panel; Future    3. Atrial fibrillation with rapid ventricular response (H)  Continue metoprolol twice daily    4. On statin therapy  - Comprehensive metabolic panel; Future    5. Grief  Massimo Parson   - Adult Mental Health  Referral; Future        Return in about 3 months (around 10/31/2023) for hypertension and lipids.    Izzy Wagner NP  [facility] - JUANCHO Fernandez is a 73 year old, presenting for the following health issues:  Hypertension, Lipids, Vascular Disease, Heart Failure, Depression, and Anxiety    HPI     Hyperlipidemia Follow-Up    Are you regularly taking any medication or supplement to lower your cholesterol?   Yes- Lipitor    Are you having muscle aches or other side effects that you think could be caused by your cholesterol lowering medication?  No    Hypertension Follow-up    Do you check your blood pressure regularly outside of the clinic? No   Are you following a low salt diet? No  Are your blood pressures ever more than 140 on the top number (systolic) OR more   than 90 on the bottom number (diastolic), for example 140/90? Yes    Vascular Disease Follow-up    How often do you take nitroglycerin? Never  Do you take an aspirin every day? Yes    Heart Failure Follow-up  Are you experiencing any shortness of breath? No  Are you experiencing any swelling in your legs or feet?  No  Are you using more pillows than usual? No  Do you cough at night?  No  Do you check your weight daily?  Yes  Have you had a weight change recently?  Weight increase  Are you having any of the following side effects from your medications? (Select all that apply)  Dizziness and Slow heart beat  Since  your last visit, how many times have you gone to the cardiologist, urgent care, emergency room, or hospital because of your heart failure?   None  Depression and Anxiety Follow-Up  How are you doing with your depression since your last visit? No change  How are you doing with your anxiety since your last visit?  No change  Are you having other symptoms that might be associated with depression or anxiety? No  Have you had a significant life event? Grief or Loss - loss of daughter in May  Do you have any concerns with your use of alcohol or other drugs? No  He is interested in therapy.      Social History     Tobacco Use    Smoking status: Former     Packs/day: 1.00     Years: 20.00     Pack years: 20.00     Types: Cigarettes     Start date: 1971     Quit date: 1971     Years since quittin.7    Smokeless tobacco: Former     Types: Chew   Vaping Use    Vaping Use: Never used   Substance Use Topics    Alcohol use: No    Drug use: No         2020     4:00 PM 11/3/2021     1:00 PM 2023     9:32 PM   PHQ   PHQ-9 Total Score 9 2 0   Q9: Thoughts of better off dead/self-harm past 2 weeks Not at all Not at all Not at all         11/3/2021     1:00 PM 10/24/2022     8:00 AM 2023     9:32 PM   RAMO-7 SCORE   Total Score   0 (minimal anxiety)   Total Score 0 0 0         Suicide Assessment Five-step Evaluation and Treatment (SAFE-T)      Recent Labs   Lab Test 23  0851 23  1134 23  1106 11/10/22  1420 10/24/22  0848 22  1022 22  0858 21  1357 21  1534 20  1634   A1C  --   --   --  5.4  --   --   --   --   --   --    LDL 46  --   --   --  73  --  80   < > 86 87   HDL 46  --   --   --  58  --  60   < > 66 53   TRIG 57  --   --   --  56  --  53   < > 48 52   ALT 16 20 12  --  15   < > 16   < > 18  --    CR 0.94 1.03 0.89  --  0.91   < > 0.91   < > 0.82 0.76   GFRESTIMATED 86 77 >90  --  90   < > 90   < > 89 >90   GFRESTBLACK  --   --   --   --   --   --    --   --  >90 >90   POTASSIUM 4.1 4.2 4.4  --  4.1   < > 3.9   < > 4.1 3.9   TSH 2.36  --   --   --  1.78  --  1.51   < > 1.53 1.57    < > = values in this interval not displayed.      BP Readings from Last 3 Encounters:   07/31/23 118/60   07/06/23 112/66   04/27/23 108/64    Wt Readings from Last 3 Encounters:   07/31/23 96.4 kg (212 lb 9.6 oz)   07/06/23 91.6 kg (202 lb)   04/27/23 93.6 kg (206 lb 6.4 oz)              Review of Systems   Constitutional, HEENT, cardiovascular, pulmonary, gi and gu systems are negative, except as otherwise noted.      Objective    /60 (BP Location: Left arm, Patient Position: Sitting, Cuff Size: Adult Large)   Pulse 55   Temp (!) 95.6  F (35.3  C) (Tympanic)   Ht 1.829 m (6')   Wt 96.4 kg (212 lb 9.6 oz)   SpO2 99%   BMI 28.83 kg/m    Body mass index is 28.83 kg/m .  Physical Exam   GENERAL: healthy, alert and no distress  NECK: no adenopathy, no asymmetry, masses, or scars, thyroid normal to palpation, and no carotid bruits  RESP: lungs clear to auscultation - no rales, rhonchi or wheezes  CV: regular rate and rhythm, normal S1 S2, no S3 or S4, no murmur  MS: no gross musculoskeletal defects noted, no edema  PSYCH: mentation appears normal, affect normal/bright                    Answers submitted by the patient for this visit:  Patient Health Questionnaire (Submitted on 7/30/2023)  If you checked off any problems, how difficult have these problems made it for you to do your work, take care of things at home, or get along with other people?: Not difficult at all  PHQ9 TOTAL SCORE: 0  RAMO-7 (Submitted on 7/30/2023)  RAMO 7 TOTAL SCORE: 0

## 2023-07-30 ENCOUNTER — HEALTH MAINTENANCE LETTER (OUTPATIENT)
Age: 73
End: 2023-07-30

## 2023-07-30 ENCOUNTER — MYC MEDICAL ADVICE (OUTPATIENT)
Dept: FAMILY MEDICINE | Facility: OTHER | Age: 73
End: 2023-07-30

## 2023-07-30 ASSESSMENT — ANXIETY QUESTIONNAIRES
IF YOU CHECKED OFF ANY PROBLEMS ON THIS QUESTIONNAIRE, HOW DIFFICULT HAVE THESE PROBLEMS MADE IT FOR YOU TO DO YOUR WORK, TAKE CARE OF THINGS AT HOME, OR GET ALONG WITH OTHER PEOPLE: NOT DIFFICULT AT ALL
4. TROUBLE RELAXING: NOT AT ALL
2. NOT BEING ABLE TO STOP OR CONTROL WORRYING: NOT AT ALL
3. WORRYING TOO MUCH ABOUT DIFFERENT THINGS: NOT AT ALL
5. BEING SO RESTLESS THAT IT IS HARD TO SIT STILL: NOT AT ALL
1. FEELING NERVOUS, ANXIOUS, OR ON EDGE: NOT AT ALL
7. FEELING AFRAID AS IF SOMETHING AWFUL MIGHT HAPPEN: NOT AT ALL
GAD7 TOTAL SCORE: 0
6. BECOMING EASILY ANNOYED OR IRRITABLE: NOT AT ALL
GAD7 TOTAL SCORE: 0

## 2023-07-30 ASSESSMENT — PATIENT HEALTH QUESTIONNAIRE - PHQ9
SUM OF ALL RESPONSES TO PHQ QUESTIONS 1-9: 0
10. IF YOU CHECKED OFF ANY PROBLEMS, HOW DIFFICULT HAVE THESE PROBLEMS MADE IT FOR YOU TO DO YOUR WORK, TAKE CARE OF THINGS AT HOME, OR GET ALONG WITH OTHER PEOPLE: NOT DIFFICULT AT ALL
SUM OF ALL RESPONSES TO PHQ QUESTIONS 1-9: 0

## 2023-07-31 ENCOUNTER — OFFICE VISIT (OUTPATIENT)
Dept: FAMILY MEDICINE | Facility: OTHER | Age: 73
End: 2023-07-31
Attending: NURSE PRACTITIONER
Payer: COMMERCIAL

## 2023-07-31 VITALS
DIASTOLIC BLOOD PRESSURE: 60 MMHG | OXYGEN SATURATION: 99 % | SYSTOLIC BLOOD PRESSURE: 118 MMHG | BODY MASS INDEX: 28.79 KG/M2 | HEIGHT: 72 IN | HEART RATE: 55 BPM | TEMPERATURE: 95.6 F | WEIGHT: 212.6 LBS

## 2023-07-31 DIAGNOSIS — I48.91 ATRIAL FIBRILLATION WITH RAPID VENTRICULAR RESPONSE (H): ICD-10-CM

## 2023-07-31 DIAGNOSIS — E78.5 HYPERLIPIDEMIA LDL GOAL <100: Primary | ICD-10-CM

## 2023-07-31 DIAGNOSIS — F43.21 GRIEF: ICD-10-CM

## 2023-07-31 DIAGNOSIS — Z79.899 ON STATIN THERAPY: ICD-10-CM

## 2023-07-31 DIAGNOSIS — I11.0 HYPERTENSIVE HEART DISEASE WITH CONGESTIVE HEART FAILURE, UNSPECIFIED HEART FAILURE TYPE (H): ICD-10-CM

## 2023-07-31 PROBLEM — Z95.818 PRESENCE OF WATCHMAN LEFT ATRIAL APPENDAGE CLOSURE DEVICE: Status: ACTIVE | Noted: 2023-07-31

## 2023-07-31 LAB
ALBUMIN SERPL BCG-MCNC: 3.9 G/DL (ref 3.5–5.2)
ALP SERPL-CCNC: 74 U/L (ref 40–129)
ALT SERPL W P-5'-P-CCNC: 16 U/L (ref 0–70)
ANION GAP SERPL CALCULATED.3IONS-SCNC: 10 MMOL/L (ref 7–15)
AST SERPL W P-5'-P-CCNC: 18 U/L (ref 0–45)
BILIRUB SERPL-MCNC: 0.4 MG/DL
BUN SERPL-MCNC: 15.9 MG/DL (ref 8–23)
CALCIUM SERPL-MCNC: 9 MG/DL (ref 8.8–10.2)
CHLORIDE SERPL-SCNC: 101 MMOL/L (ref 98–107)
CHOLEST SERPL-MCNC: 115 MG/DL
CREAT SERPL-MCNC: 0.9 MG/DL (ref 0.67–1.17)
DEPRECATED HCO3 PLAS-SCNC: 25 MMOL/L (ref 22–29)
GFR SERPL CREATININE-BSD FRML MDRD: 90 ML/MIN/1.73M2
GLUCOSE SERPL-MCNC: 111 MG/DL (ref 70–99)
HDLC SERPL-MCNC: 62 MG/DL
HOLD SPECIMEN: NORMAL
LDLC SERPL CALC-MCNC: 46 MG/DL
NONHDLC SERPL-MCNC: 53 MG/DL
POTASSIUM SERPL-SCNC: 4 MMOL/L (ref 3.4–5.3)
PROT SERPL-MCNC: 6.6 G/DL (ref 6.4–8.3)
SODIUM SERPL-SCNC: 136 MMOL/L (ref 136–145)
TRIGL SERPL-MCNC: 34 MG/DL
TSH SERPL DL<=0.005 MIU/L-ACNC: 1.69 UIU/ML (ref 0.3–4.2)

## 2023-07-31 PROCEDURE — 36415 COLL VENOUS BLD VENIPUNCTURE: CPT | Mod: ZL | Performed by: NURSE PRACTITIONER

## 2023-07-31 PROCEDURE — 80053 COMPREHEN METABOLIC PANEL: CPT | Mod: ZL | Performed by: NURSE PRACTITIONER

## 2023-07-31 PROCEDURE — 84443 ASSAY THYROID STIM HORMONE: CPT | Mod: ZL | Performed by: NURSE PRACTITIONER

## 2023-07-31 PROCEDURE — G0463 HOSPITAL OUTPT CLINIC VISIT: HCPCS | Performed by: NURSE PRACTITIONER

## 2023-07-31 PROCEDURE — 99214 OFFICE O/P EST MOD 30 MIN: CPT | Performed by: NURSE PRACTITIONER

## 2023-07-31 PROCEDURE — 80061 LIPID PANEL: CPT | Mod: ZL | Performed by: NURSE PRACTITIONER

## 2023-07-31 RX ORDER — METOPROLOL TARTRATE 50 MG
50 TABLET ORAL 2 TIMES DAILY
COMMUNITY
Start: 2023-07-17 | End: 2023-07-31

## 2023-07-31 ASSESSMENT — PAIN SCALES - GENERAL: PAINLEVEL: NO PAIN (0)

## 2023-07-31 NOTE — PATIENT INSTRUCTIONS
Assessment & Plan     1. Hyperlipidemia LDL goal <100  Continue lipitor  - Lipid Profile; Future  - TSH with free T4 reflex; Future    2. Hypertensive heart disease with congestive heart failure, unspecified heart failure type (H)  Well controlled   - Comprehensive metabolic panel; Future    3. Atrial fibrillation with rapid ventricular response (H)  Continue metoprolol twice daily    4. On statin therapy  - Comprehensive metabolic panel; Future      Return in about 3 months (around 10/31/2023) for hypertension and lipids.    Izzy Wagner NP  [facility]

## 2023-07-31 NOTE — TELEPHONE ENCOUNTER
Pt called and was hoping to get his appointment with audiology sooner then in October.  Pt was provided there contact number and encouraged to call and get put on a waiting list or to see if they have a sooner appointment.  Pt verbalized understanding and stated he would have his wife call in the morning.

## 2023-08-10 ENCOUNTER — OFFICE VISIT (OUTPATIENT)
Dept: AUDIOLOGY | Facility: OTHER | Age: 73
End: 2023-08-10
Attending: PHYSICIAN ASSISTANT
Payer: MEDICARE

## 2023-08-10 DIAGNOSIS — H90.3 SENSORINEURAL HEARING LOSS, BILATERAL: ICD-10-CM

## 2023-08-10 PROCEDURE — 92550 TYMPANOMETRY & REFLEX THRESH: CPT | Performed by: AUDIOLOGIST

## 2023-08-10 PROCEDURE — 92557 COMPREHENSIVE HEARING TEST: CPT | Performed by: AUDIOLOGIST

## 2023-08-10 NOTE — PROGRESS NOTES
Audiology Evaluation Completed. Please refer SCANNED AUDIOGRAM and/or TYMPANOGRAM for BACKGROUND, RESULTS, RECOMMENDATIONS.      Randa BLUE, [facility]  Audiologist #[id number]

## 2023-08-26 ENCOUNTER — MYC MEDICAL ADVICE (OUTPATIENT)
Dept: FAMILY MEDICINE | Facility: OTHER | Age: 73
End: 2023-08-26

## 2023-08-26 DIAGNOSIS — H91.93 BILATERAL HEARING LOSS, UNSPECIFIED HEARING LOSS TYPE: Primary | ICD-10-CM

## 2023-08-29 NOTE — TELEPHONE ENCOUNTER
Please see MCM and advise.    Pended partial audiology referral per pt request  Diagnosis: [place]  Please review and sign is appropriate.

## 2023-10-13 DIAGNOSIS — K21.00 GASTROESOPHAGEAL REFLUX DISEASE WITH ESOPHAGITIS WITHOUT HEMORRHAGE: ICD-10-CM

## 2023-10-13 NOTE — TELEPHONE ENCOUNTER
Famotidine (Pepcid) 20 MG tablet    Last Written Prescription Date:  0804/2022  Last Fill Quantity: 90,   # refills: 2  Last Office Visit: 07/31/2023

## 2023-10-16 RX ORDER — FAMOTIDINE 20 MG/1
20 TABLET, FILM COATED ORAL 2 TIMES DAILY PRN
Qty: 90 TABLET | Refills: 2 | Status: SHIPPED | OUTPATIENT
Start: 2023-10-16 | End: 2024-07-23

## 2023-10-25 ENCOUNTER — OFFICE VISIT (OUTPATIENT)
Dept: AUDIOLOGY | Facility: OTHER | Age: 73
End: 2023-10-25
Attending: AUDIOLOGIST
Payer: MEDICARE

## 2023-10-25 DIAGNOSIS — H90.3 SENSORINEURAL HEARING LOSS (SNHL) OF BOTH EARS: Primary | ICD-10-CM

## 2023-10-25 PROCEDURE — 92591 HC HEARING AID EXAM BINAURAL: CPT | Performed by: AUDIOLOGIST

## 2023-10-25 NOTE — PROGRESS NOTES
BACKGROUND:  Jim was seen today for consult regarding hearing aids. The patient notes difficulty with communication in a variety of listening situations and would like to explore possible benefit obtained via use of amplification.      Patient has received medical clearance for hearing aid use from Rupal Dinero PA-C.  Jim is a binaural hearing aid candidate and will receive a Hearing Aid Recommendation today.    RESULTS:  Audiology Assistant reviewed below information:    Estimated insurance coverage for hearing aids reviewed.  [place] Department of Health form titled 'Legal rights and consumer information about purchasing a hearing instrument verbally reviewed and given to patient. Trial Period, cancellation fee, warranties highlighted. Patient risk factors have been provided to the patient in writing prior to the sale of the hearing aid per FDA regulation. The risk factors are also available in the User Instructional Booklet to be presented on the day of the hearing aid fitting.  ABN (Advanced Beneficiary Notice of Non-Coverage) completed and copy given to patient.       Hearing aid recommendation provided by Audiologist.    Thru use of hearing aids patient would like to improve ability to hear:  where there are groups and noise.   to hear the television at a lower volume to enjoy this activity with other people.   Jim also reports trouble hearing in the car, at home, and on the telephone if there is not a volume control.      Discussed hearing aid styles, technology, features, and options at length with Jim.  Sample devices were shown. Pros/Cons of options reviewed.  Expectations for amplification discussed. .Also discussed the importance of binaural amplification for hearing speech in the presence of background noise and localizing the directions of sounds.  Wireless options were also discussed at length and sample devices were shown.       Hearing Aid Recommendations: Hearing Aid Recommendation reviewed  with patient. Pt chose to proceed with order and Purchase Agreement completed. Copies provided to patient.      Hearing Aids:  Binaural Oticon Real 3 miniRITE T  Color: beige  Battery Size: rechargeable  Earmold/Tips: 3-85      RECOMMENDATIONS:  The 45 day trial period was explained to patient, and they expressed understanding. Mr. Brown signed the Hearing Aid Purchase Agreement and was given a copy, as well as details on his hearing aids. Patient was counseled that exact out of pocket amounts cannot be determined for hearing aid claims being sent to insurance. Any insurance coverage information presented to the patient is an estimate only, and is not a guarantee of payment. Patient has been advised to check with their own insurance.      Patient scheduled to return to clinic in 2 weeks for hearing aid fitting, programming and orientation.    Randa Curiel M.S.,[facility]  Audiologist #[id number]

## 2023-10-30 NOTE — PROGRESS NOTES
Assessment & Plan     1. Hyperlipidemia LDL goal <100  Continue lipitor  - Lipid Profile; Future    2. Hypertensive heart disease with congestive heart failure, unspecified heart failure type (H)  Cardiology notes reviewed   - Comprehensive metabolic panel; Future  - TSH with free T4 reflex; Future  - metoprolol tartrate (LOPRESSOR) 25 MG tablet; Take 0.5 tablets (12.5 mg) by mouth 2 times daily  - CBC with platelets; Future    3. Atrial fibrillation with rapid ventricular response (H)  resolved    4. Coronary atherosclerosis due to calcified coronary lesion  Notes reviewed     5. Seizure disorder (H)  Continue keppra     6. On statin therapy  - Comprehensive metabolic panel; Future    7. Need for vaccination  Routine   - INFLUENZA VACCINE 65+ (FLUZONE HD)         BMI:   Estimated body mass index is 28.83 kg/m  as calculated from the following:    Height as of this encounter: 1.829 m (6').    Weight as of this encounter: 96.4 kg (212 lb 9.6 oz).           Return in about 3 months (around 2/1/2024) for hypertension and lipids.    Izzy Wagner NP  [facility] [place] REGINO Fernandez is a 73 year old, presenting for the following health issues:  Hypertension and Lipids        11/1/2023    10:39 AM   Additional Questions   Roomed by alfred   Accompanied by wife       HPI     Hyperlipidemia Follow-Up    Are you regularly taking any medication or supplement to lower your cholesterol?   Yes- Lipitor 80 mg  Are you having muscle aches or other side effects that you think could be caused by your cholesterol lowering medication?  No    Hypertension Follow-up    Do you check your blood pressure regularly outside of the clinic? No   Are you following a low salt diet? Yes  Are your blood pressures ever more than 140 on the top number (systolic) OR more   than 90 on the bottom number (diastolic), for example 140/90? No      Heart Failure Follow-up  Are you experiencing any shortness of breath?  No  Are you experiencing any swelling in your legs or feet?  No  Are you using more pillows than usual? No  Do you cough at night?  No  Do you check your weight daily?  Yes  Have you had a weight change recently?  Weight increase over time   Are you having any of the following side effects from your medications? (Select all that apply)  Slow heart beat  Since your last visit, how many times have you gone to the cardiologist, urgent care, emergency room, or hospital because of your heart failure?   1 time  Last Echo: No results found.            Review of Systems   Constitutional, HEENT, cardiovascular, pulmonary, gi and gu systems are negative, except as otherwise noted.      Objective    /60 (BP Location: Left arm, Patient Position: Chair, Cuff Size: Adult Regular)   Pulse (!) 49   Temp 97.2  F (36.2  C) (Tympanic)   Resp 18   Ht 1.829 m (6')   Wt 96.4 kg (212 lb 9.6 oz)   SpO2 98%   BMI 28.83 kg/m    Body mass index is 28.83 kg/m .  Physical Exam   GENERAL: healthy, alert and no distress  NECK: no adenopathy, no asymmetry, masses, or scars, thyroid normal to palpation, and no carotid bruits  RESP: lungs clear to auscultation - no rales, rhonchi or wheezes  CV: regular rate and rhythm, normal S1 S2, no S3 or S4, no murmur  MS: no gross musculoskeletal defects noted, no edema  PSYCH: mentation appears normal, affect normal/bright

## 2023-11-01 ENCOUNTER — OFFICE VISIT (OUTPATIENT)
Dept: FAMILY MEDICINE | Facility: OTHER | Age: 73
End: 2023-11-01
Attending: NURSE PRACTITIONER
Payer: MEDICARE

## 2023-11-01 VITALS
TEMPERATURE: 97.2 F | DIASTOLIC BLOOD PRESSURE: 60 MMHG | BODY MASS INDEX: 28.79 KG/M2 | SYSTOLIC BLOOD PRESSURE: 102 MMHG | HEART RATE: 49 BPM | OXYGEN SATURATION: 98 % | HEIGHT: 72 IN | RESPIRATION RATE: 18 BRPM | WEIGHT: 212.6 LBS

## 2023-11-01 DIAGNOSIS — I25.84 CORONARY ATHEROSCLEROSIS DUE TO CALCIFIED CORONARY LESION: ICD-10-CM

## 2023-11-01 DIAGNOSIS — Z23 NEED FOR VACCINATION: ICD-10-CM

## 2023-11-01 DIAGNOSIS — I11.0 HYPERTENSIVE HEART DISEASE WITH CONGESTIVE HEART FAILURE, UNSPECIFIED HEART FAILURE TYPE (H): ICD-10-CM

## 2023-11-01 DIAGNOSIS — I25.10 CORONARY ATHEROSCLEROSIS DUE TO CALCIFIED CORONARY LESION: ICD-10-CM

## 2023-11-01 DIAGNOSIS — Z79.899 ON STATIN THERAPY: ICD-10-CM

## 2023-11-01 DIAGNOSIS — E78.5 HYPERLIPIDEMIA LDL GOAL <100: Primary | ICD-10-CM

## 2023-11-01 DIAGNOSIS — G40.909 SEIZURE DISORDER (H): ICD-10-CM

## 2023-11-01 DIAGNOSIS — I48.91 ATRIAL FIBRILLATION WITH RAPID VENTRICULAR RESPONSE (H): ICD-10-CM

## 2023-11-01 LAB
ALBUMIN SERPL BCG-MCNC: 4.3 G/DL (ref 3.5–5.2)
ALP SERPL-CCNC: 78 U/L (ref 40–129)
ALT SERPL W P-5'-P-CCNC: 17 U/L (ref 0–70)
ANION GAP SERPL CALCULATED.3IONS-SCNC: 13 MMOL/L (ref 7–15)
AST SERPL W P-5'-P-CCNC: 22 U/L (ref 0–45)
BILIRUB SERPL-MCNC: 0.3 MG/DL
BUN SERPL-MCNC: 16.3 MG/DL (ref 8–23)
CALCIUM SERPL-MCNC: 9.4 MG/DL (ref 8.8–10.2)
CHLORIDE SERPL-SCNC: 99 MMOL/L (ref 98–107)
CHOLEST SERPL-MCNC: 118 MG/DL
CREAT SERPL-MCNC: 0.95 MG/DL (ref 0.67–1.17)
DEPRECATED HCO3 PLAS-SCNC: 26 MMOL/L (ref 22–29)
EGFRCR SERPLBLD CKD-EPI 2021: 85 ML/MIN/1.73M2
ERYTHROCYTE [DISTWIDTH] IN BLOOD BY AUTOMATED COUNT: 12.9 % (ref 10–15)
GLUCOSE SERPL-MCNC: 80 MG/DL (ref 70–99)
HCT VFR BLD AUTO: 38.4 % (ref 40–53)
HDLC SERPL-MCNC: 60 MG/DL
HGB BLD-MCNC: 12.7 G/DL (ref 13.3–17.7)
LDLC SERPL CALC-MCNC: 51 MG/DL
MCH RBC QN AUTO: 31.7 PG (ref 26.5–33)
MCHC RBC AUTO-ENTMCNC: 33.1 G/DL (ref 31.5–36.5)
MCV RBC AUTO: 96 FL (ref 78–100)
NONHDLC SERPL-MCNC: 58 MG/DL
PLATELET # BLD AUTO: 208 10E3/UL (ref 150–450)
POTASSIUM SERPL-SCNC: 4.1 MMOL/L (ref 3.4–5.3)
PROT SERPL-MCNC: 6.9 G/DL (ref 6.4–8.3)
RBC # BLD AUTO: 4.01 10E6/UL (ref 4.4–5.9)
SODIUM SERPL-SCNC: 138 MMOL/L (ref 135–145)
TRIGL SERPL-MCNC: 33 MG/DL
TSH SERPL DL<=0.005 MIU/L-ACNC: 1.67 UIU/ML (ref 0.3–4.2)
WBC # BLD AUTO: 4 10E3/UL (ref 4–11)

## 2023-11-01 PROCEDURE — 36415 COLL VENOUS BLD VENIPUNCTURE: CPT | Mod: ZL | Performed by: NURSE PRACTITIONER

## 2023-11-01 PROCEDURE — 85027 COMPLETE CBC AUTOMATED: CPT | Mod: ZL | Performed by: NURSE PRACTITIONER

## 2023-11-01 PROCEDURE — G0463 HOSPITAL OUTPT CLINIC VISIT: HCPCS | Mod: 25

## 2023-11-01 PROCEDURE — 84443 ASSAY THYROID STIM HORMONE: CPT | Mod: ZL | Performed by: NURSE PRACTITIONER

## 2023-11-01 PROCEDURE — 80061 LIPID PANEL: CPT | Mod: ZL | Performed by: NURSE PRACTITIONER

## 2023-11-01 PROCEDURE — 99214 OFFICE O/P EST MOD 30 MIN: CPT | Performed by: NURSE PRACTITIONER

## 2023-11-01 PROCEDURE — 80053 COMPREHEN METABOLIC PANEL: CPT | Mod: ZL | Performed by: NURSE PRACTITIONER

## 2023-11-01 PROCEDURE — G0008 ADMIN INFLUENZA VIRUS VAC: HCPCS

## 2023-11-01 RX ORDER — FAMOTIDINE 20 MG
1000 TABLET ORAL DAILY
COMMUNITY

## 2023-11-01 RX ORDER — ASPIRIN 325 MG
325 TABLET, DELAYED RELEASE (ENTERIC COATED) ORAL DAILY
COMMUNITY

## 2023-11-01 RX ORDER — METOPROLOL TARTRATE 25 MG/1
12.5 TABLET, FILM COATED ORAL 2 TIMES DAILY
COMMUNITY
Start: 2023-11-01 | End: 2024-05-02

## 2023-11-01 RX ORDER — MULTIVIT WITH MINERALS/LUTEIN
250 TABLET ORAL DAILY
COMMUNITY

## 2023-11-01 ASSESSMENT — PAIN SCALES - GENERAL: PAINLEVEL: NO PAIN (0)

## 2023-11-01 NOTE — PATIENT INSTRUCTIONS
Assessment & Plan     1. Hyperlipidemia LDL goal <100  Continue lipitor  - Lipid Profile; Future    2. Hypertensive heart disease with congestive heart failure, unspecified heart failure type (H)  Cardiology notes reviewed   - Comprehensive metabolic panel; Future  - TSH with free T4 reflex; Future  - metoprolol tartrate (LOPRESSOR) 25 MG tablet; Take 0.5 tablets (12.5 mg) by mouth 2 times daily  - CBC with platelets; Future    3. Atrial fibrillation with rapid ventricular response (H)  resolved    4. Coronary atherosclerosis due to calcified coronary lesion  Notes reviewed     5. Seizure disorder (H)  Continue keppra     6. On statin therapy  - Comprehensive metabolic panel; Future    7. Need for vaccination  Routine   - INFLUENZA VACCINE 65+ (FLUZONE HD)         BMI:   Estimated body mass index is 28.83 kg/m  as calculated from the following:    Height as of this encounter: 1.829 m (6').    Weight as of this encounter: 96.4 kg (212 lb 9.6 oz).           Return in about 3 months (around 2/1/2024) for hypertension and lipids.    Izzy Wagner NP  [facility]

## 2023-11-10 ENCOUNTER — OFFICE VISIT (OUTPATIENT)
Dept: AUDIOLOGY | Facility: OTHER | Age: 73
End: 2023-11-10
Attending: AUDIOLOGIST
Payer: MEDICARE

## 2023-11-10 DIAGNOSIS — H90.3 SENSORINEURAL HEARING LOSS (SNHL) OF BOTH EARS: Primary | ICD-10-CM

## 2023-11-10 PROCEDURE — V5160 DISPENSING FEE BINAURAL: HCPCS | Performed by: AUDIOLOGIST

## 2023-11-10 PROCEDURE — 92593 HC HEARING AID CHECK, BINAURAL: CPT | Performed by: AUDIOLOGIST

## 2023-11-10 PROCEDURE — V5261 HEARING AID, DIGIT, BIN, BTE: HCPCS | Mod: NU | Performed by: AUDIOLOGIST

## 2023-11-10 PROCEDURE — V5011 HEARING AID FITTING/CHECKING: HCPCS | Performed by: AUDIOLOGIST

## 2023-11-10 PROCEDURE — 92593 PR HEARING AID CHECK, BINAURAL: CPT | Performed by: AUDIOLOGIST

## 2023-11-10 PROCEDURE — V5020 CONFORMITY EVALUATION: HCPCS | Performed by: AUDIOLOGIST

## 2023-11-10 NOTE — PROGRESS NOTES
AUDIOLOGY REPORT    SUBJECTIVE: Jim Brown, a 73 year old male, was seen in the Audiology Clinic at [facility] today for a Binaural hearing aid fitting. Previous results have revealed a bilateral  sensorineural hearing loss.     OBJECTIVE:  Prior to fitting, a hearing aid check was performed to ensure device functionality. The hearing aid conformity evaluation was completed.The hearing aids were placed and they provided a good fit. Real-ear-probe-microphone measurements were completed on the [employer] system and were a good match to NAL-NL2 target with soft sounds audible, moderate sounds comfortable, and loud sounds below discomfort. UCLs are verified through maximum power output measures and demonstrate appropriate limiting of loud inputs.     11/10/2023            Hearing Device Info   Left Ear Make: Oticon   Left Ear Model #: Real 3 miniRITE R   Left Ear Style: BTE   Left HA Warranty End Date: 11/24/2026   Left HA Serial #: [id number]   Right Ear Make: Oticon   Right Ear Model #: Real 3 miniRITE R   Right Ear Style: BTE   Right HA Warranty End Date: 11/24/2026   Right HA Serial #: [id number]   Fitting Plan: Standard       ASSESSMENT: Hearing aid fitting completed today. Verification measures were performed. Patient and/or caregiver demonstrated ability to insert and remove hearing aids and adjust volume toggle.    Randa Curiel M.S., [facility]-A  Audiologist #[id number]      HEARING AID ORIENTATION/AUDIOLOGY ASSISTANT      Mr. Brown was oriented to proper hearing aid use, care, cleaning (no water, dry brush), batteries (size Rechargeable, low-battery signal), aid insertion/removal, user booklet, warranty information, storage cases, and other hearing aid details. The patient confirmed understanding of hearing aid use and care, and showed proper insertion of hearing aid while in the office today. Mr. Brown reported good volume and sound quality today.    Addie Solomon  Audiology  Assistant  [facility] [place]  [phone]        PLAN: Mr. Brown will return for follow-up in 2-3 weeks for a hearing aid review appointment. Please call this clinic with questions regarding today s appointment.

## 2023-11-30 ENCOUNTER — ALLIED HEALTH/NURSE VISIT (OUTPATIENT)
Dept: AUDIOLOGY | Facility: OTHER | Age: 73
End: 2023-11-30
Attending: AUDIOLOGIST
Payer: MEDICARE

## 2023-11-30 DIAGNOSIS — H90.3 SENSORINEURAL HEARING LOSS (SNHL) OF BOTH EARS: Primary | ICD-10-CM

## 2023-11-30 NOTE — PROGRESS NOTES
AUDIOLOGY ASSISTANT REPORT    SUBJECTIVE:Jim Brown is a 73 year old male who was seen in the Audiology Clinic at the [facility] on 11/30/2023  for a follow-up review of their hearing aids.  The patient has been seen previously in this clinic and was fit with [employer] Real 3 miniRITE R hearing aids on 11/10/2023.  Jim reports good sound quality with the hearing aid(s).     OBJECTIVE:   A follow-up review was performed.  The hearing aid conformity evaluation was previously completed by the audiologist. Based on patient report, no audiology appointment for adjustments needed.    Listening goals reviewed and patient reports they are met to satisfaction.  Patient denied concerns with retention, fit, or function.    Reviewed 45 day trial period, care, cleaning (no water, dry brush), batteries (size Rechargeable) low-battery signal), aid insertion/removal, volume adjustment (if applicable), user booklet, warranty information, storage cases, and other hearing aid details.       ASSESSMENT: A follow-up appointment for hearing aid(s) was completed today. Changes to hearing aid if shown was completed as outlined above.     PLAN:Jim will return for follow-up as needed, or in 12 months.  The patient reports satisfaction and wants to keep the hearing aids. The patient will return for hearing aid checks as needed and in 12 months.  Please call this clinic with any questions regarding today s appointment.      Addie Solomon  Audiology Assistant  [facility]  [phone]

## 2024-01-08 ENCOUNTER — MYC MEDICAL ADVICE (OUTPATIENT)
Dept: FAMILY MEDICINE | Facility: OTHER | Age: 74
End: 2024-01-08
Payer: COMMERCIAL

## 2024-01-08 DIAGNOSIS — R10.11 ABDOMINAL DISCOMFORT IN RIGHT UPPER QUADRANT: ICD-10-CM

## 2024-01-08 DIAGNOSIS — R11.0 NAUSEA: Primary | ICD-10-CM

## 2024-01-08 NOTE — TELEPHONE ENCOUNTER
Pt called and per pt he has been having some nausea and right abdominal discomfort just below his ribs that will last just a few minutes to hours after eating greasy foods.  Per pt no other symptoms.   Pt stated that he had this back in February-March.      Pended up lipase and amylase labs per pt request.  Please review and sign.    Pt scheduled with PCP on 01/17/2024.   If needing to be seen earlier will need to see covering provider or overbook/dr only.     Please advise.

## 2024-01-15 ENCOUNTER — LAB (OUTPATIENT)
Dept: LAB | Facility: OTHER | Age: 74
End: 2024-01-15
Payer: MEDICARE

## 2024-01-15 DIAGNOSIS — R11.0 NAUSEA: ICD-10-CM

## 2024-01-15 DIAGNOSIS — R10.11 ABDOMINAL DISCOMFORT IN RIGHT UPPER QUADRANT: ICD-10-CM

## 2024-01-15 LAB
AMYLASE SERPL-CCNC: 73 U/L (ref 28–100)
HOLD SPECIMEN: NORMAL
LIPASE SERPL-CCNC: 57 U/L (ref 13–60)

## 2024-01-15 PROCEDURE — 82150 ASSAY OF AMYLASE: CPT | Mod: ZL | Performed by: NURSE PRACTITIONER

## 2024-01-15 PROCEDURE — 83690 ASSAY OF LIPASE: CPT | Mod: ZL

## 2024-01-15 NOTE — PROGRESS NOTES
Assessment & Plan     1. Abdominal pain, right upper quadrant  Avoid greasy fatty foods  - Hepatic function panel; Future  - Basic metabolic panel; Future  - CBC with platelets and differential; Future  - declined CT scan for now     2. Onychomycosis  Referral placed to Dr Nice  - Orthopedic  Referral; Future    3. Cracked skin on feet  As above  Lotions:   Cerave Tub lotion   Cerave ointment   Aveeno baby - tub  - Orthopedic  Referral; Future    4. Other eczema  - triamcinolone (KENALOG) 0.1 % external cream; Apply topically 2 times daily as needed for irritation  Dispense: 453 g; Refill: 1        BMI  Estimated body mass index is 29.88 kg/m  as calculated from the following:    Height as of 11/1/23: 1.829 m (6').    Weight as of this encounter: 99.9 kg (220 lb 4.8 oz).     Follow-up as needed       Izzy Wagner,   Certified Adult Nurse Practitioner  [phone]      Subjective   Jim is a 73 year old, presenting for the following health issues:  Abdominal Pain        1/17/2024    12:57 PM   Additional Questions   Roomed by Margarita BURROWS   Accompanied by Wife       HPI     Pain History:  When did you first notice your pain? Spring of last year   Have you seen anyone else for your pain? No  How has your pain affected your ability to work? Not applicable  Where in your body do you have pain? Abdominal/Flank Pain  Onset/Duration: Spring of last year  Description:   Character: Dull ache  Location: right upper quadrant - only after eating greasy fried foods, otherwise he is fine.    Radiation: None  Intensity: moderate  Progression of Symptoms:  worsening  Accompanying Signs & Symptoms:  Fever/Chills: No  Gas/Bloating: No  Nausea: No  Vomitting: No  Diarrhea: No  Constipation: No  Dysuria or Hematuria: No  History:   Trauma: No  Previous similar pain: YES  Previous tests done: unknown  Precipitating factors:   Does the pain change with:     Food: YES    Bowel Movement: No    Urination: No    Other factors:  No  Therapies tried and outcome: None    Acute Illness  Acute illness concerns: Dry/itchy spots on back, Cracking of heels and knuckles   Onset/Duration: fall time  Symptoms:  Fever: No  Chills/Sweats: No  Headache (location?): No  Sinus Pressure: No  Conjunctivitis:  No  Ear Pain: no  Rhinorrhea: No  Congestion: No  Sore Throat: No  Cough: no  Wheeze: No  Decreased Appetite: No  Nausea: No  Vomiting: No  Diarrhea: No  Dysuria/Freq.: No  Dysuria or Hematuria: No  Fatigue/Achiness: No  Sick/Strep Exposure: No  Therapies tried and outcome: lotions       Review of Systems   Constitutional, HEENT, cardiovascular, pulmonary, gi and gu systems are negative, except as otherwise noted.      Objective    /72 (BP Location: Left arm, Patient Position: Sitting, Cuff Size: Adult Large)   Pulse 57   Temp 97.9  F (36.6  C) (Tympanic)   Resp 16   Wt 99.9 kg (220 lb 4.8 oz)   SpO2 98%   BMI 29.88 kg/m    Body mass index is 29.88 kg/m .  Physical Exam   GENERAL: alert and no distress  RESP: lungs clear to auscultation - no rales, rhonchi or wheezes  CV: regular rate and rhythm, normal S1 S2, no S3 or S4, no murmur  ABDOMEN: soft, nontender, no hepatosplenomegaly, no masses and bowel sounds normal  MS: no gross musculoskeletal defects noted, no edema  SKIN: fungal toenails, dry feet with several cracks in heels.    PSYCH: mentation appears normal, affect normal/bright

## 2024-01-17 ENCOUNTER — OFFICE VISIT (OUTPATIENT)
Dept: FAMILY MEDICINE | Facility: OTHER | Age: 74
End: 2024-01-17
Attending: NURSE PRACTITIONER
Payer: COMMERCIAL

## 2024-01-17 VITALS
SYSTOLIC BLOOD PRESSURE: 118 MMHG | OXYGEN SATURATION: 98 % | WEIGHT: 220.3 LBS | RESPIRATION RATE: 16 BRPM | TEMPERATURE: 97.9 F | BODY MASS INDEX: 29.88 KG/M2 | HEART RATE: 57 BPM | DIASTOLIC BLOOD PRESSURE: 72 MMHG

## 2024-01-17 DIAGNOSIS — L30.8 OTHER ECZEMA: ICD-10-CM

## 2024-01-17 DIAGNOSIS — R10.11 ABDOMINAL PAIN, RIGHT UPPER QUADRANT: Primary | ICD-10-CM

## 2024-01-17 DIAGNOSIS — R23.4 CRACKED SKIN ON FEET: ICD-10-CM

## 2024-01-17 DIAGNOSIS — B35.1 ONYCHOMYCOSIS: ICD-10-CM

## 2024-01-17 LAB
ALBUMIN SERPL BCG-MCNC: 4.1 G/DL (ref 3.5–5.2)
ALP SERPL-CCNC: 74 U/L (ref 40–150)
ALT SERPL W P-5'-P-CCNC: 22 U/L (ref 0–70)
ANION GAP SERPL CALCULATED.3IONS-SCNC: 9 MMOL/L (ref 7–15)
AST SERPL W P-5'-P-CCNC: 26 U/L (ref 0–45)
BASOPHILS # BLD AUTO: 0 10E3/UL (ref 0–0.2)
BASOPHILS NFR BLD AUTO: 0 %
BILIRUB DIRECT SERPL-MCNC: <0.2 MG/DL (ref 0–0.3)
BILIRUB SERPL-MCNC: 0.3 MG/DL
BUN SERPL-MCNC: 18 MG/DL (ref 8–23)
CALCIUM SERPL-MCNC: 9.3 MG/DL (ref 8.8–10.2)
CHLORIDE SERPL-SCNC: 100 MMOL/L (ref 98–107)
CREAT SERPL-MCNC: 0.89 MG/DL (ref 0.67–1.17)
DEPRECATED HCO3 PLAS-SCNC: 28 MMOL/L (ref 22–29)
EGFRCR SERPLBLD CKD-EPI 2021: 90 ML/MIN/1.73M2
EOSINOPHIL # BLD AUTO: 0 10E3/UL (ref 0–0.7)
EOSINOPHIL NFR BLD AUTO: 0 %
ERYTHROCYTE [DISTWIDTH] IN BLOOD BY AUTOMATED COUNT: 12.9 % (ref 10–15)
GLUCOSE SERPL-MCNC: 82 MG/DL (ref 70–99)
HCT VFR BLD AUTO: 40.8 % (ref 40–53)
HGB BLD-MCNC: 13.4 G/DL (ref 13.3–17.7)
IMM GRANULOCYTES # BLD: 0 10E3/UL
IMM GRANULOCYTES NFR BLD: 0 %
LYMPHOCYTES # BLD AUTO: 1.2 10E3/UL (ref 0.8–5.3)
LYMPHOCYTES NFR BLD AUTO: 34 %
MCH RBC QN AUTO: 31.3 PG (ref 26.5–33)
MCHC RBC AUTO-ENTMCNC: 32.8 G/DL (ref 31.5–36.5)
MCV RBC AUTO: 95 FL (ref 78–100)
MONOCYTES # BLD AUTO: 0.5 10E3/UL (ref 0–1.3)
MONOCYTES NFR BLD AUTO: 15 %
NEUTROPHILS # BLD AUTO: 1.8 10E3/UL (ref 1.6–8.3)
NEUTROPHILS NFR BLD AUTO: 51 %
PLATELET # BLD AUTO: 202 10E3/UL (ref 150–450)
POTASSIUM SERPL-SCNC: 3.7 MMOL/L (ref 3.4–5.3)
PROT SERPL-MCNC: 6.8 G/DL (ref 6.4–8.3)
RBC # BLD AUTO: 4.28 10E6/UL (ref 4.4–5.9)
SODIUM SERPL-SCNC: 137 MMOL/L (ref 135–145)
WBC # BLD AUTO: 3.5 10E3/UL (ref 4–11)

## 2024-01-17 PROCEDURE — G0463 HOSPITAL OUTPT CLINIC VISIT: HCPCS

## 2024-01-17 PROCEDURE — 36415 COLL VENOUS BLD VENIPUNCTURE: CPT | Mod: ZL | Performed by: NURSE PRACTITIONER

## 2024-01-17 PROCEDURE — 85025 COMPLETE CBC W/AUTO DIFF WBC: CPT | Mod: ZL | Performed by: NURSE PRACTITIONER

## 2024-01-17 PROCEDURE — 80053 COMPREHEN METABOLIC PANEL: CPT | Mod: ZL | Performed by: NURSE PRACTITIONER

## 2024-01-17 PROCEDURE — 99214 OFFICE O/P EST MOD 30 MIN: CPT | Performed by: NURSE PRACTITIONER

## 2024-01-17 RX ORDER — TRIAMCINOLONE ACETONIDE 1 MG/G
CREAM TOPICAL 2 TIMES DAILY PRN
Qty: 453 G | Refills: 1 | Status: SHIPPED | OUTPATIENT
Start: 2024-01-17

## 2024-01-17 ASSESSMENT — PAIN SCALES - GENERAL: PAINLEVEL: MODERATE PAIN (4)

## 2024-01-17 NOTE — PATIENT INSTRUCTIONS
Assessment & Plan     1. Abdominal pain, right upper quadrant  Avoid greasy fatty foods  - Hepatic function panel; Future  - Basic metabolic panel; Future  - CBC with platelets and differential; Future    2. Onychomycosis  Referral placed to Dr Nice  - Orthopedic  Referral; Future    3. Cracked skin on feet  As above  Lotions:   Cerave Tub lotion   Cerave ointment   Aveeno baby - tub  - Orthopedic  Referral; Future    4. Other eczema  - triamcinolone (KENALOG) 0.1 % external cream; Apply topically 2 times daily as needed for irritation  Dispense: 453 g; Refill: 1        BMI  Estimated body mass index is 29.88 kg/m  as calculated from the following:    Height as of 11/1/23: 1.829 m (6').    Weight as of this encounter: 99.9 kg (220 lb 4.8 oz).     Follow-up as needed       Izzy Wagner,   Certified Adult Nurse Practitioner  [phone]

## 2024-01-18 DIAGNOSIS — D72.818 OTHER DECREASED WHITE BLOOD CELL (WBC) COUNT: Primary | ICD-10-CM

## 2024-01-30 NOTE — PROGRESS NOTES
Assessment & Plan     1. Hyperlipidemia LDL goal <100  Continue current plan  - atorvastatin (LIPITOR) 80 MG tablet; Take 1 tablet (80 mg) by mouth at bedtime  Dispense: 90 tablet; Refill: 1    2. Hypertensive heart disease with congestive heart failure, unspecified heart failure type (H)  Continue metoprolol twice daily  Cardiology notes reviewed.     3. Coronary atherosclerosis due to calcified coronary lesion  stable    4. Seizure disorder (H)  Continue Keppra    5. Gastroesophageal reflux disease with esophagitis without hemorrhage  Continue pepcid.      6. Change in pigmented lesion of face  Dr Isaac   - Adult Dermatology  Referral; Future      BMI  Estimated body mass index is 29.66 kg/m  as calculated from the following:    Height as of 11/1/23: 1.829 m (6').    Weight as of this encounter: 99.2 kg (218 lb 11.2 oz).         Follow-up in 3 months or as needed    Izzy Wagner,   Certified Adult Nurse Practitioner  [phone]        Subjective   Jim is a 73 year old, presenting for the following health issues:  Chronic Disease Management        2/2/2024     8:19 AM   Additional Questions   Roomed by Margarita BURROWS   Accompanied by Wife     HPI     Hyperlipidemia Follow-Up    Are you regularly taking any medication or supplement to lower your cholesterol?   Yes- Lipitor  Are you having muscle aches or other side effects that you think could be caused by your cholesterol lowering medication?  No    Hypertension Follow-up    Do you check your blood pressure regularly outside of the clinic? No   Are you following a low salt diet? Yes  Are your blood pressures ever more than 140 on the top number (systolic) OR more   than 90 on the bottom number (diastolic), for example 140/90? No Does not check outside of clinic    Heart Failure Follow-up   Are you experiencing any shortness of breath? No  Are you experiencing any swelling in your legs or feet?  No  Are you using more pillows than usual? No  Do you  cough at night?  No  Do you check your weight daily?  No  Have you had a weight change recently?  No  Are you having any of the following side effects from your medications? (Select all that apply)  The patient does not report symptoms of dizziness, fatigue, cough, swelling, or slow heart beat.  Since your last visit, how many times have you gone to the cardiologist, urgent care, emergency room, or hospital because of your heart failure?   None  Last Echo: No results found.    He has a changing lesion on  his face - right cheek.  He has had similar lesions in the past but none that have grown this fast.  He noticed it 3 weeks ago and it is growing quickly.      Recent Labs   Lab Test 01/17/24  1348 11/01/23  1126 07/31/23  1007 04/27/23  0851 01/24/23  1106 11/10/22  1420 11/03/21  1357 04/29/21  1534 08/25/20  1634   A1C  --   --   --   --   --  5.4  --   --   --    LDL  --  51 46 46  --   --    < > 86 87   HDL  --  60 62 46  --   --    < > 66 53   TRIG  --  33 34 57  --   --    < > 48 52   ALT 22 17 16 16   < >  --    < > 18  --    CR 0.89 0.95 0.90 0.94   < >  --    < > 0.82 0.76   GFRESTIMATED 90 85 90 86   < >  --    < > 89 >90   GFRESTBLACK  --   --   --   --   --   --   --  >90 >90   POTASSIUM 3.7 4.1 4.0 4.1   < >  --    < > 4.1 3.9   TSH  --  1.67 1.69 2.36  --   --    < > 1.53 1.57    < > = values in this interval not displayed.      BP Readings from Last 3 Encounters:   02/02/24 109/71   01/17/24 118/72   11/01/23 102/60    Wt Readings from Last 3 Encounters:   02/02/24 99.2 kg (218 lb 11.2 oz)   01/17/24 99.9 kg (220 lb 4.8 oz)   11/01/23 96.4 kg (212 lb 9.6 oz)                        Review of Systems  Constitutional, neuro, ENT, endocrine, pulmonary, cardiac, gastrointestinal, genitourinary, musculoskeletal, integument and psychiatric systems are negative, except as otherwise noted.      Objective    /71 (BP Location: Left arm, Patient Position: Sitting, Cuff Size: Adult Large)   Pulse 57    Temp 97.9  F (36.6  C) (Tympanic)   Resp 16   Wt 99.2 kg (218 lb 11.2 oz)   SpO2 95%   BMI 29.66 kg/m    Body mass index is 29.66 kg/m .  Physical Exam   GENERAL: alert and no distress  NECK: no adenopathy, no asymmetry, masses, or scars, thyroid normal to palpation, and no carotid bruits  RESP: lungs clear to auscultation - no rales, rhonchi or wheezes  CV: regular rate and rhythm, normal S1 S2, no S3 or S4, no murmur, click or rub, no peripheral edema  MS: no gross musculoskeletal defects noted, no edema  SKIN: tan scaly round raised lesion on right cheek.  Borders are sharp.    PSYCH: mentation appears normal, affect normal/bright    Previous labs reviewed.         Signed Electronically by: Izzy Wagner NP

## 2024-02-01 ENCOUNTER — LAB (OUTPATIENT)
Dept: LAB | Facility: OTHER | Age: 74
End: 2024-02-01
Payer: MEDICARE

## 2024-02-01 DIAGNOSIS — D72.818 OTHER DECREASED WHITE BLOOD CELL (WBC) COUNT: ICD-10-CM

## 2024-02-01 LAB
BASOPHILS # BLD AUTO: 0 10E3/UL (ref 0–0.2)
BASOPHILS NFR BLD AUTO: 0 %
EOSINOPHIL # BLD AUTO: 0 10E3/UL (ref 0–0.7)
EOSINOPHIL NFR BLD AUTO: 0 %
ERYTHROCYTE [DISTWIDTH] IN BLOOD BY AUTOMATED COUNT: 12.9 % (ref 10–15)
HCT VFR BLD AUTO: 39.8 % (ref 40–53)
HGB BLD-MCNC: 13.6 G/DL (ref 13.3–17.7)
IMM GRANULOCYTES # BLD: 0 10E3/UL
IMM GRANULOCYTES NFR BLD: 0 %
LYMPHOCYTES # BLD AUTO: 1.6 10E3/UL (ref 0.8–5.3)
LYMPHOCYTES NFR BLD AUTO: 36 %
MCH RBC QN AUTO: 31.6 PG (ref 26.5–33)
MCHC RBC AUTO-ENTMCNC: 34.2 G/DL (ref 31.5–36.5)
MCV RBC AUTO: 93 FL (ref 78–100)
MONOCYTES # BLD AUTO: 0.5 10E3/UL (ref 0–1.3)
MONOCYTES NFR BLD AUTO: 11 %
NEUTROPHILS # BLD AUTO: 2.3 10E3/UL (ref 1.6–8.3)
NEUTROPHILS NFR BLD AUTO: 52 %
PLATELET # BLD AUTO: 194 10E3/UL (ref 150–450)
RBC # BLD AUTO: 4.3 10E6/UL (ref 4.4–5.9)
WBC # BLD AUTO: 4.5 10E3/UL (ref 4–11)

## 2024-02-01 PROCEDURE — 85025 COMPLETE CBC W/AUTO DIFF WBC: CPT | Mod: ZL

## 2024-02-01 PROCEDURE — 36415 COLL VENOUS BLD VENIPUNCTURE: CPT | Mod: ZL

## 2024-02-02 ENCOUNTER — OFFICE VISIT (OUTPATIENT)
Dept: FAMILY MEDICINE | Facility: OTHER | Age: 74
End: 2024-02-02
Attending: NURSE PRACTITIONER
Payer: COMMERCIAL

## 2024-02-02 VITALS
RESPIRATION RATE: 16 BRPM | WEIGHT: 218.7 LBS | HEART RATE: 57 BPM | SYSTOLIC BLOOD PRESSURE: 109 MMHG | DIASTOLIC BLOOD PRESSURE: 71 MMHG | OXYGEN SATURATION: 95 % | BODY MASS INDEX: 29.66 KG/M2 | TEMPERATURE: 97.9 F

## 2024-02-02 DIAGNOSIS — I25.10 CORONARY ATHEROSCLEROSIS DUE TO CALCIFIED CORONARY LESION: ICD-10-CM

## 2024-02-02 DIAGNOSIS — E78.5 HYPERLIPIDEMIA LDL GOAL <100: Primary | ICD-10-CM

## 2024-02-02 DIAGNOSIS — I25.84 CORONARY ATHEROSCLEROSIS DUE TO CALCIFIED CORONARY LESION: ICD-10-CM

## 2024-02-02 DIAGNOSIS — I11.0 HYPERTENSIVE HEART DISEASE WITH CONGESTIVE HEART FAILURE, UNSPECIFIED HEART FAILURE TYPE (H): ICD-10-CM

## 2024-02-02 DIAGNOSIS — L81.9 CHANGE IN PIGMENTED LESION OF FACE: ICD-10-CM

## 2024-02-02 DIAGNOSIS — G40.909 SEIZURE DISORDER (H): ICD-10-CM

## 2024-02-02 DIAGNOSIS — K21.00 GASTROESOPHAGEAL REFLUX DISEASE WITH ESOPHAGITIS WITHOUT HEMORRHAGE: ICD-10-CM

## 2024-02-02 PROCEDURE — 99214 OFFICE O/P EST MOD 30 MIN: CPT | Performed by: NURSE PRACTITIONER

## 2024-02-02 PROCEDURE — G0463 HOSPITAL OUTPT CLINIC VISIT: HCPCS

## 2024-02-02 RX ORDER — ATORVASTATIN CALCIUM 80 MG/1
80 TABLET, FILM COATED ORAL AT BEDTIME
Qty: 90 TABLET | Refills: 1 | Status: SHIPPED | OUTPATIENT
Start: 2024-02-02 | End: 2024-08-05

## 2024-02-02 ASSESSMENT — PAIN SCALES - GENERAL: PAINLEVEL: NO PAIN (0)

## 2024-02-02 NOTE — PATIENT INSTRUCTIONS
Assessment & Plan     1. Hyperlipidemia LDL goal <100  Continue current plan  - atorvastatin (LIPITOR) 80 MG tablet; Take 1 tablet (80 mg) by mouth at bedtime  Dispense: 90 tablet; Refill: 1    2. Hypertensive heart disease with congestive heart failure, unspecified heart failure type (H)  Continue metoprolol twice daily    3. Coronary atherosclerosis due to calcified coronary lesion  stable    4. Seizure disorder (H)  Continue Keppra    5. Gastroesophageal reflux disease with esophagitis without hemorrhage  Continue pepcid.      6. Change in pigmented lesion of face  Dr Isaac   - Adult Dermatology  Referral; Future    BMI  Estimated body mass index is 29.66 kg/m  as calculated from the following:    Height as of 11/1/23: 1.829 m (6').    Weight as of this encounter: 99.2 kg (218 lb 11.2 oz).         Follow-up in 3 months or as needed    Izzy Wagner,   Certified Adult Nurse Practitioner  [phone]

## 2024-02-07 ENCOUNTER — OFFICE VISIT (OUTPATIENT)
Dept: PODIATRY | Facility: OTHER | Age: 74
End: 2024-02-07
Attending: PODIATRIST
Payer: MEDICARE

## 2024-02-07 VITALS
HEART RATE: 51 BPM | SYSTOLIC BLOOD PRESSURE: 120 MMHG | OXYGEN SATURATION: 97 % | TEMPERATURE: 97 F | DIASTOLIC BLOOD PRESSURE: 80 MMHG

## 2024-02-07 DIAGNOSIS — L84 CALLUS OF FOOT: ICD-10-CM

## 2024-02-07 DIAGNOSIS — R23.4 CRACKED SKIN ON FEET: ICD-10-CM

## 2024-02-07 DIAGNOSIS — L60.3 ONYCHODYSTROPHY: Primary | ICD-10-CM

## 2024-02-07 PROCEDURE — 99203 OFFICE O/P NEW LOW 30 MIN: CPT | Mod: 25 | Performed by: PODIATRIST

## 2024-02-07 PROCEDURE — 11750 EXCISION NAIL&NAIL MATRIX: CPT | Mod: T6,XS | Performed by: PODIATRIST

## 2024-02-07 PROCEDURE — 11750 EXCISION NAIL&NAIL MATRIX: CPT | Mod: T7 | Performed by: PODIATRIST

## 2024-02-07 PROCEDURE — G0463 HOSPITAL OUTPT CLINIC VISIT: HCPCS | Mod: 25

## 2024-02-07 PROCEDURE — 11750 EXCISION NAIL&NAIL MATRIX: CPT | Performed by: PODIATRIST

## 2024-02-07 ASSESSMENT — PAIN SCALES - GENERAL: PAINLEVEL: NO PAIN (0)

## 2024-02-07 NOTE — PROGRESS NOTES
Chief complaint: Patient presents with:  Toenail: Possible fungus      History of Present Illness: This 73 year old male is seen at the request of Kristy for evaluation and suggestions of management of thickened toenails. The nails became thick and discolored after he traveled to the [facility] in 2022. His feet were soaked for a long time in boots and the toenails became thick, discolored and loose. He is very active and walks daily and multiple toenails consistently fall off. This causes discomfort and he would like to discuss treatment options for the thickened toenails that keep falling off.    Additionally, he has dry skin on his heels that frequently crack open and bleed. He recently started using a cream from his PCP once an evening, but he still has thick skin that cracks. He is not sure how to treat the cracking.    No further pedal complaints today.         /80 (BP Location: Left arm, Patient Position: Sitting, Cuff Size: Adult Regular)   Pulse 51   Temp 97  F (36.1  C) (Tympanic)   SpO2 97%     Patient Active Problem List   Diagnosis    Hyperlipidemia LDL goal <100    Reactive depression    Gastroesophageal reflux disease with esophagitis    Benign prostatic hyperplasia with urinary obstruction    Acute pancreatitis    Agoraphobia with panic disorder    Compression deformity of vertebra    Pericardial effusion    Personality disorder (H)    Unilateral inguinal hernia    Umbilical hernia    Acute respiratory failure with hypoxia (H)    Acute on chronic heart failure with preserved ejection fraction (H)    Atherosclerosis    Chronic pansinusitis    Coronary atherosclerosis due to calcified coronary lesion    Dynamic left ventricular outflow obstruction    Elevated troponin I level    Epistaxis    Non-invasive aspergillosis of nasal sinus (H)    RLL pneumonia    Severe sepsis without septic shock (H)    Type 2 acute myocardial infarction (H)    Seizure disorder (H)    Presence of  Watchman left atrial appendage closure device       Past Surgical History:   Procedure Laterality Date    ABDOMEN SURGERY      hernia surgerys 3-4    CHOLECYSTECTOMY  2016    galbladder     COLONOSCOPY  2018    [facility], negative for polyps 10 year     ENDOSCOPIC ENDONASAL SURGERY Right 2023    Procedure: Right Endoscopic Sinus Surgery with removal of right sphenoid sinus fungal ball;  Surgeon: Lexy Novak MD;  Location: [facility] OR    ENT SURGERY      tonsilectomy when little     HERNIA REPAIR      SEPTOPLASTY, TURBINOPLASTY, COMBINED Bilateral 2023    Procedure: Bilateral Turbinate Reduction, Septoplasty;  Surgeon: Leyx Novak MD;  Location: [facility] OR       Current Outpatient Medications   Medication    aspirin (ASA) 325 MG EC tablet    atorvastatin (LIPITOR) 80 MG tablet    famotidine (PEPCID) 20 MG tablet    levETIRAcetam (KEPPRA) 500 MG tablet    metoprolol tartrate (LOPRESSOR) 25 MG tablet    Multiple Vitamin (MULTI-VITAMINS) TABS    nitroGLYcerin (NITROSTAT) 0.4 MG sublingual tablet    Probiotic Product (PROBIOTIC-10 PO)    triamcinolone (KENALOG) 0.1 % external cream    vitamin C 250 MG tablet    Vitamin D, Cholecalciferol, 25 MCG (1000 UT) CAPS     No current facility-administered medications for this visit.        No Known Allergies    Family History   Problem Relation Age of Onset    Coronary Artery Disease Father     Other Cancer Brother     Other Cancer Sister        Social History     Socioeconomic History    Marital status:      Spouse name: None    Number of children: None    Years of education: None    Highest education level: None   Tobacco Use    Smoking status: Former     Packs/day: 1.00     Years: 20.00     Additional pack years: 0.00     Total pack years: 20.00     Types: Cigarettes     Start date: 1971     Quit date: 1971     Years since quittin.2     Passive exposure: Past    Smokeless tobacco: Former     Types: Chew   Vaping Use    Vaping  Use: Never used   Substance and Sexual Activity    Alcohol use: No    Drug use: No    Sexual activity: Not Currently     Birth control/protection: Male Surgical   Other Topics Concern    Parent/sibling w/ CABG, MI or angioplasty before 65F 55M? No     Social Determinants of Health     Financial Resource Strain: Low Risk  (1/10/2024)    Financial Resource Strain     Within the past 12 months, have you or your family members you live with been unable to get utilities (heat, electricity) when it was really needed?: No   Food Insecurity: Low Risk  (1/10/2024)    Food Insecurity     Within the past 12 months, did you worry that your food would run out before you got money to buy more?: No     Within the past 12 months, did the food you bought just not last and you didn t have money to get more?: No   Transportation Needs: Low Risk  (1/10/2024)    Transportation Needs     Within the past 12 months, has lack of transportation kept you from medical appointments, getting your medicines, non-medical meetings or appointments, work, or from getting things that you need?: No   Housing Stability: Low Risk  (1/10/2024)    Housing Stability     Do you have housing? : Yes     Are you worried about losing your housing?: No       ROS: 10 point ROS neg other than the symptoms noted above in the HPI.  EXAM  Constitutional: healthy, alert, and no distress    Psychiatric: mentation appears normal and affect normal/bright    VASCULAR:  -Dorsalis pedis pulse +2/4 b/l  -Posterior tibial pulse +2/4 b/l  -Capillary refill time < 3 seconds to b/l hallux  NEURO:  -Light touch sensation intact to b/l plantar forefoot  DERM:  -Skin temperature, texture and turgor WNL b/l  -Toenails elongated, thickened, dystrophic and discolored x 10  ---Bilateral hallux, 2nd, and 3rd toenails moderately thickened and partially loose beneath the nail bed  -Moderate hyperkeratotic lesion on the bilateral medial rim of the heel  ---Cracks in skin but no current  cracking through the skin  MSK:  -Muscle strength of ankles +5/5 for dorsiflexion, plantarflexion, ABDUction and ADDuction b/l    ============================================================    ASSESSMENT:  (L60.3) Onychodystrophy  (primary encounter diagnosis)    (R23.4) Cracked skin on feet    (L84) Callus of foot        PLAN:  -Patient evaluated and examined. Treatment options discussed with no educational barriers noted.  -Cracking heels and calluses:   ---Discussed cracking skin including the risks of dry, cracking skin creating ulcerations on the feet. Areas of skin breakdown can break through the skin layer and cause pain or can become infected which can then potentially lead to life threatening wounds or amputation.  ---Dry skin occurs when there is not enough moisture in the outer layers of the skin. Multiple factors can contribute to this including climates with low humidity (including [place] winters), dehydration, using harsh soaps on the skin, frequent exposure to chlorinated water, frequent bathing, or employment that requires a lot of walking in outdoor environments or on very hard, cement surfaces.  ---Treatment of Xerosis can include application of lotion to the feet at least twice per day. Application of the lotion at night may be followed by the application of socks to prevent the lotion from rubbing off the foot on the floors or bedding. A pumice stone or Matthew board may be used to remove excessive, flaking skin. Care should be taken not to be too aggressive and cause akin breakdown from scrubbing.  ---It is important to monitor and treat the feet daily to prevent the dry skin from starting to or from continuing to crack own. Patient expressed understanding and agreed with this plan.  ---Patient has a prescription cream from his PCP. He is advised to apply it to the calluses twice a day (not between the toes) and use a file on the calluses daily to keep the calluses trimmed short. If he has  open cracks and bleeding, apply an antibiotic ointment and cover with gauze until healed.   -This is an acute, uncomplicated illness/injury with OTC treatment options reviewed.    ---------------------------------------    Onychodystrophy  -Discussed etiologies and treatment options for onychodystrophy. There may be fungi in the toenail, but it is not known until a nail culture is performed. Treatment options consist of leaving the toenail as is, treating the nail for fungi (culture would be taken today to confirm nail fungi), a nail avulsion and treating the fungi as the nail grows back in, or removing the toenail permanently. The oral medication can sometimes fluctuate liver values so the patient would need to get a liver blood draw before, during and after the 90 day treatment. Topical anti-fungals can be used, but they do not always full cure the toenail fungi and they must be used daily and sometimes for 6-12 months before noticing a difference in the toenail. Recurrence rate of toenail fungi is high. After reviewing risks and benefits, patient has decided to proceed with removing the bilateral hallux, 2nd and 3rd toenails permanently. Will do the RIGHT foot today and follow-up for the LEFT foot. He is in agreement with this plan.    Ingrown toenail(s):  -Discussed nail procedure options and etiologies and treatments for ingrown toenails. Conservatively, patient could opt for a slant back today and keep monitoring the toe since there are no SOI. Discussed risks and benefits and healing course of a nail border avulsion vs. Matrixectomy including post procedure infection or a non-healing wound, both of which could lead to a life threatening infection or amputation of the foot or leg or a proximal amputation. Patient understands the risks and benefits and has decided to proceed with the following:    -Matrixectomy of right hallux, second and third toes: Written and verbal consent obtained after reviewing risks and  "benefits of the procedure. Patient understands that although phenol is used in attempt to prevent regrowth of the ingrown toenail, the nail can still grow back. There is also a risk of post procedure infection. A severe foot infection could lead to a proximal foot or leg amputation or loss of life, so the patient is advised to return to podiatry or the ED immediately if the patient notices any SOI. The patient is in agreement with this plan and wishes to proceed with the procedure. A time-out was performed to identify the correct patient, limb, digit and procedure.    An alcohol prep pad was applied to  to the base of the right hallux, second and third toes. Each digit was injected with 1:1 of 2% Lidocaine plain and 0.5% marcaine plain in a ring block fashion at the base of the toe(s) (6mL in the hallux and 2.5mL in the second toe and 2.5mL in the third toe). Adequate local anesthesia was obtained. A ring tournicot was applied to the digit and a chloroprep was applied to the three toes. A freer was used to loosen the nail from the underlying nail bed. A hemostat was then used to remove the RIGHT hallux, second and third toes in total. A total of three applications of phenol were applied for 30 seconds per application per toe. Each toe was rinsed thoroughly with alcohol. The tournicot was removed from the RIGHT hallux, second and third toes and there was a prompt hyperemic response to the RIGHT hallux, second and third toes. The wound was then dressed with an Silvadene, gauze and 1\" coban. The patient was educated on after procedure care including daily epsom salt soaks starting tomorrow followed by dressing of the toe with an antibiotic cream and a bandaid until the wound site on the toe stops draining (2-3 weeks). Provided education on how to look for signs of infection (redness, swelling, pain, purulence, fever, chills, nausea, vomiting) and the patient was instructed to return to the clinic or Emergency " Department immediately if there are any signs of infection.        -Patient in agreement with the above treatment plan and all of patient's questions were answered.      Return to clinic one month for a matrixectomy of the LEFT hallux, second and third toes        Savita Her DPM

## 2024-02-07 NOTE — PATIENT INSTRUCTIONS
Nail procedure care:  -Start epsom salt soaks tomorrow. Soak the foot 1-2 times a day for 20 minutes.  -Apply an antibiotic cream, gauze and a bandaid over the toe for the first week after the procedure.  -After one week, continue the epsom salt soaks, but switch to a betadine dressing. Apply a small amount of betadine on gauze or dab the betadine over the toe with gauze and apply another dry gauze over the toe followed by a band aid or tape.  -Do not apply a band aid directly over the nail procedure site without gauze or it will trap too much moisture beneath the band aid.  Note: Continue the epsom salt soaks and dressings every day until the wound is fully healed. You should not see drainage on the bandage for at least two days in a row. Call the clinic if the wound is still moderately draining two weeks after the procedure.    Keep the toe covered at all times until it is completely healed.  -You may develop a black scab over the nail bed--let this fall off on its own and don't pick at it.  -The toe may drain for 2-3 weeks. It is normal for it to have a clear drainage.    Watching for signs of infection:  If the toe has a thick, white pus coming from the procedure site or if the the toe becomes red, swollen, painful, or you begin to feel sick (fever/chills/nausea/vomitting), return to the podiatry clinic immediately or to the Emergency Department room if after hours.      Podiatry can be reached directly at [phone]. Leave a voicemail if there is not an answer.    Thank you for allowing  and our Podiatry team to participate in your care. Please call our office at [phone] with scheduling questions or with any other questions or concerns.

## 2024-02-07 NOTE — LETTER
2/7/2024         RE: Jim Brown  [address]   [address]  [facility] [address]        Dear Colleague,    Thank you for referring your patient, Jim Brown, to the [facility]. Please see a copy of my visit note below.    Chief complaint: Patient presents with:  Toenail: Possible fungus      History of Present Illness: This 73 year old male is seen at the request of Kristy for evaluation and suggestions of management of thickened toenails. The nails became thick and discolored after he traveled to the [facility] in 2022. His feet were soaked for a long time in boots and the toenails became thick, discolored and loose. He is very active and walks daily and multiple toenails consistently fall off. This causes discomfort and he would like to discuss treatment options for the thickened toenails that keep falling off.    Additionally, he has dry skin on his heels that frequently crack open and bleed. He recently started using a cream from his PCP once an evening, but he still has thick skin that cracks. He is not sure how to treat the cracking.    No further pedal complaints today.         /80 (BP Location: Left arm, Patient Position: Sitting, Cuff Size: Adult Regular)   Pulse 51   Temp 97  F (36.1  C) (Tympanic)   SpO2 97%     Patient Active Problem List   Diagnosis     Hyperlipidemia LDL goal <100     Reactive depression     Gastroesophageal reflux disease with esophagitis     Benign prostatic hyperplasia with urinary obstruction     Acute pancreatitis     Agoraphobia with panic disorder     Compression deformity of vertebra     Pericardial effusion     Personality disorder (H)     Unilateral inguinal hernia     Umbilical hernia     Acute respiratory failure with hypoxia (H)     Acute on chronic heart failure with preserved ejection fraction (H)     Atherosclerosis     Chronic pansinusitis     Coronary atherosclerosis due to calcified coronary lesion     Dynamic  left ventricular outflow obstruction     Elevated troponin I level     Epistaxis     Non-invasive aspergillosis of nasal sinus (H)     RLL pneumonia     Severe sepsis without septic shock (H)     Type 2 acute myocardial infarction (H)     Seizure disorder (H)     Presence of Watchman left atrial appendage closure device       Past Surgical History:   Procedure Laterality Date     ABDOMEN SURGERY      hernia surgerys 3-4     CHOLECYSTECTOMY  2016    galbladder      COLONOSCOPY  12/06/2018    [facility], negative for polyps 10 year      ENDOSCOPIC ENDONASAL SURGERY Right 01/31/2023    Procedure: Right Endoscopic Sinus Surgery with removal of right sphenoid sinus fungal ball;  Surgeon: Lexy Novak MD;  Location: [facility]     ENT SURGERY      tonsilectomy when little      HERNIA REPAIR       SEPTOPLASTY, TURBINOPLASTY, COMBINED Bilateral 01/31/2023    Procedure: Bilateral Turbinate Reduction, Septoplasty;  Surgeon: Lexy Novak MD;  Location: [facility]       Current Outpatient Medications   Medication     aspirin (ASA) 325 MG EC tablet     atorvastatin (LIPITOR) 80 MG tablet     famotidine (PEPCID) 20 MG tablet     levETIRAcetam (KEPPRA) 500 MG tablet     metoprolol tartrate (LOPRESSOR) 25 MG tablet     Multiple Vitamin (MULTI-VITAMINS) TABS     nitroGLYcerin (NITROSTAT) 0.4 MG sublingual tablet     Probiotic Product (PROBIOTIC-10 PO)     triamcinolone (KENALOG) 0.1 % external cream     vitamin C 250 MG tablet     Vitamin D, Cholecalciferol, 25 MCG (1000 UT) CAPS     No current facility-administered medications for this visit.        No Known Allergies    Family History   Problem Relation Age of Onset     Coronary Artery Disease Father      Other Cancer Brother      Other Cancer Sister        Social History     Socioeconomic History     Marital status:      Spouse name: None     Number of children: None     Years of education: None     Highest education level: None   Tobacco Use     Smoking status:  Former     Packs/day: 1.00     Years: 20.00     Additional pack years: 0.00     Total pack years: 20.00     Types: Cigarettes     Start date: 1971     Quit date: 1971     Years since quittin.2     Passive exposure: Past     Smokeless tobacco: Former     Types: Chew   Vaping Use     Vaping Use: Never used   Substance and Sexual Activity     Alcohol use: No     Drug use: No     Sexual activity: Not Currently     Birth control/protection: Male Surgical   Other Topics Concern     Parent/sibling w/ CABG, MI or angioplasty before 65F 55M? No     Social Determinants of Health     Financial Resource Strain: Low Risk  (1/10/2024)    Financial Resource Strain      Within the past 12 months, have you or your family members you live with been unable to get utilities (heat, electricity) when it was really needed?: No   Food Insecurity: Low Risk  (1/10/2024)    Food Insecurity      Within the past 12 months, did you worry that your food would run out before you got money to buy more?: No      Within the past 12 months, did the food you bought just not last and you didn t have money to get more?: No   Transportation Needs: Low Risk  (1/10/2024)    Transportation Needs      Within the past 12 months, has lack of transportation kept you from medical appointments, getting your medicines, non-medical meetings or appointments, work, or from getting things that you need?: No   Housing Stability: Low Risk  (1/10/2024)    Housing Stability      Do you have housing? : Yes      Are you worried about losing your housing?: No       ROS: 10 point ROS neg other than the symptoms noted above in the HPI.  EXAM  Constitutional: healthy, alert, and no distress    Psychiatric: mentation appears normal and affect normal/bright    VASCULAR:  -Dorsalis pedis pulse +2/4 b/l  -Posterior tibial pulse +2/4 b/l  -Capillary refill time < 3 seconds to b/l hallux  NEURO:  -Light touch sensation intact to b/l plantar forefoot  DERM:  -Skin  temperature, texture and turgor WNL b/l  -Toenails elongated, thickened, dystrophic and discolored x 10  ---Bilateral hallux, 2nd, and 3rd toenails moderately thickened and partially loose beneath the nail bed  -Moderate hyperkeratotic lesion on the bilateral medial rim of the heel  ---Cracks in skin but no current cracking through the skin  MSK:  -Muscle strength of ankles +5/5 for dorsiflexion, plantarflexion, ABDUction and ADDuction b/l    ============================================================    ASSESSMENT:  (L60.3) Onychodystrophy  (primary encounter diagnosis)    (R23.4) Cracked skin on feet    (L84) Callus of foot        PLAN:  -Patient evaluated and examined. Treatment options discussed with no educational barriers noted.  -Cracking heels and calluses:   ---Discussed cracking skin including the risks of dry, cracking skin creating ulcerations on the feet. Areas of skin breakdown can break through the skin layer and cause pain or can become infected which can then potentially lead to life threatening wounds or amputation.  ---Dry skin occurs when there is not enough moisture in the outer layers of the skin. Multiple factors can contribute to this including climates with low humidity (including [place] winters), dehydration, using harsh soaps on the skin, frequent exposure to chlorinated water, frequent bathing, or employment that requires a lot of walking in outdoor environments or on very hard, cement surfaces.  ---Treatment of Xerosis can include application of lotion to the feet at least twice per day. Application of the lotion at night may be followed by the application of socks to prevent the lotion from rubbing off the foot on the floors or bedding. A pumice stone or Doucette board may be used to remove excessive, flaking skin. Care should be taken not to be too aggressive and cause akin breakdown from scrubbing.  ---It is important to monitor and treat the feet daily to prevent the dry skin from  starting to or from continuing to crack own. Patient expressed understanding and agreed with this plan.  ---Patient has a prescription cream from his PCP. He is advised to apply it to the calluses twice a day (not between the toes) and use a file on the calluses daily to keep the calluses trimmed short. If he has open cracks and bleeding, apply an antibiotic ointment and cover with gauze until healed.   -This is an acute, uncomplicated illness/injury with OTC treatment options reviewed.    ---------------------------------------    Onychodystrophy  -Discussed etiologies and treatment options for onychodystrophy. There may be fungi in the toenail, but it is not known until a nail culture is performed. Treatment options consist of leaving the toenail as is, treating the nail for fungi (culture would be taken today to confirm nail fungi), a nail avulsion and treating the fungi as the nail grows back in, or removing the toenail permanently. The oral medication can sometimes fluctuate liver values so the patient would need to get a liver blood draw before, during and after the 90 day treatment. Topical anti-fungals can be used, but they do not always full cure the toenail fungi and they must be used daily and sometimes for 6-12 months before noticing a difference in the toenail. Recurrence rate of toenail fungi is high. After reviewing risks and benefits, patient has decided to proceed with removing the bilateral hallux, 2nd and 3rd toenails permanently. Will do the RIGHT foot today and follow-up for the LEFT foot. He is in agreement with this plan.    Ingrown toenail(s):  -Discussed nail procedure options and etiologies and treatments for ingrown toenails. Conservatively, patient could opt for a slant back today and keep monitoring the toe since there are no SOI. Discussed risks and benefits and healing course of a nail border avulsion vs. Matrixectomy including post procedure infection or a non-healing wound, both of  "which could lead to a life threatening infection or amputation of the foot or leg or a proximal amputation. Patient understands the risks and benefits and has decided to proceed with the following:    -Matrixectomy of right hallux, second and third toes: Written and verbal consent obtained after reviewing risks and benefits of the procedure. Patient understands that although phenol is used in attempt to prevent regrowth of the ingrown toenail, the nail can still grow back. There is also a risk of post procedure infection. A severe foot infection could lead to a proximal foot or leg amputation or loss of life, so the patient is advised to return to podiatry or the ED immediately if the patient notices any SOI. The patient is in agreement with this plan and wishes to proceed with the procedure. A time-out was performed to identify the correct patient, limb, digit and procedure.    An alcohol prep pad was applied to  to the base of the right hallux, second and third toes. Each digit was injected with 1:1 of 2% Lidocaine plain and 0.5% marcaine plain in a ring block fashion at the base of the toe(s) (6mL in the hallux and 2.5mL in the second toe and 2.5mL in the third toe). Adequate local anesthesia was obtained. A ring tournicot was applied to the digit and a chloroprep was applied to the three toes. A freer was used to loosen the nail from the underlying nail bed. A hemostat was then used to remove the RIGHT hallux, second and third toes in total. A total of three applications of phenol were applied for 30 seconds per application per toe. Each toe was rinsed thoroughly with alcohol. The tournicot was removed from the RIGHT hallux, second and third toes and there was a prompt hyperemic response to the RIGHT hallux, second and third toes. The wound was then dressed with an Silvadene, gauze and 1\" coban. The patient was educated on after procedure care including daily epsom salt soaks starting tomorrow followed by " dressing of the toe with an antibiotic cream and a bandaid until the wound site on the toe stops draining (2-3 weeks). Provided education on how to look for signs of infection (redness, swelling, pain, purulence, fever, chills, nausea, vomiting) and the patient was instructed to return to the clinic or Emergency Department immediately if there are any signs of infection.        -Patient in agreement with the above treatment plan and all of patient's questions were answered.      Return to clinic one month for a matrixectomy of the LEFT hallux, second and third toes        Savita Her DPM    Again, thank you for allowing me to participate in the care of your patient.        Sincerely,        Savita Her DPM

## 2024-02-15 ENCOUNTER — TRANSFERRED RECORDS (OUTPATIENT)
Dept: HEALTH INFORMATION MANAGEMENT | Facility: CLINIC | Age: 74
End: 2024-02-15

## 2024-03-06 ENCOUNTER — OFFICE VISIT (OUTPATIENT)
Dept: PODIATRY | Facility: OTHER | Age: 74
End: 2024-03-06
Attending: PODIATRIST
Payer: MEDICARE

## 2024-03-06 VITALS
HEART RATE: 51 BPM | OXYGEN SATURATION: 98 % | DIASTOLIC BLOOD PRESSURE: 62 MMHG | SYSTOLIC BLOOD PRESSURE: 131 MMHG | TEMPERATURE: 97.8 F

## 2024-03-06 DIAGNOSIS — S91.301A OPEN WOUND OF RIGHT FOOT, INITIAL ENCOUNTER: ICD-10-CM

## 2024-03-06 DIAGNOSIS — L60.3 ONYCHODYSTROPHY: Primary | ICD-10-CM

## 2024-03-06 PROCEDURE — 11750 EXCISION NAIL&NAIL MATRIX: CPT | Mod: TA | Performed by: PODIATRIST

## 2024-03-06 PROCEDURE — G0463 HOSPITAL OUTPT CLINIC VISIT: HCPCS | Mod: 25

## 2024-03-06 PROCEDURE — 99212 OFFICE O/P EST SF 10 MIN: CPT | Mod: 25 | Performed by: PODIATRIST

## 2024-03-06 PROCEDURE — 11750 EXCISION NAIL&NAIL MATRIX: CPT | Mod: T2,XS | Performed by: PODIATRIST

## 2024-03-06 PROCEDURE — 11750 EXCISION NAIL&NAIL MATRIX: CPT | Mod: T1 | Performed by: PODIATRIST

## 2024-03-06 ASSESSMENT — PAIN SCALES - GENERAL: PAINLEVEL: NO PAIN (0)

## 2024-03-06 NOTE — PROGRESS NOTES
Chief complaint: Patient presents with:  Ingrown Toenail: 1st, 2nd, 3rd of left foot      History of Present Illness: This 73 year old male is seen for follow-up management of thickened toenails. Patient had his RIGHT hallux, 2nd and 3rd toenails permanently removed in February, 2024. The wounds have healed and he no longer has pain from the procedure sites. The LEFT hallux, 2nd and 3rd toenails are also very thick, often fall off and cause him discomfort. He would like the three toenails removed from his LEFT foot today.    Additionally, he says his RIGHT hallux matrixectomy site never fully healed. It is still draining a little. He discontinued the dressing one day within the past week thinking it might do better when airing out, but it didn't improve so he resumed a betadine and gauze dressing. It is not painful, but there is still some mild drainage.    No further pedal complaints today.         /62 (BP Location: Left arm, Patient Position: Sitting, Cuff Size: Adult Regular)   Pulse 51   Temp 97.8  F (36.6  C) (Tympanic)   SpO2 98%      Patient Active Problem List   Diagnosis    Hyperlipidemia LDL goal <100    Reactive depression    Gastroesophageal reflux disease with esophagitis    Benign prostatic hyperplasia with urinary obstruction    Acute pancreatitis    Agoraphobia with panic disorder    Compression deformity of vertebra    Pericardial effusion    Personality disorder (H)    Unilateral inguinal hernia    Umbilical hernia    Acute respiratory failure with hypoxia (H)    Acute on chronic heart failure with preserved ejection fraction (H)    Atherosclerosis    Chronic pansinusitis    Coronary atherosclerosis due to calcified coronary lesion    Dynamic left ventricular outflow obstruction    Elevated troponin I level    Epistaxis    Non-invasive aspergillosis of nasal sinus (H)    RLL pneumonia    Severe sepsis without septic shock (H)    Type 2 acute myocardial infarction (H)    Seizure disorder  (H)    Presence of Watchman left atrial appendage closure device       Past Surgical History:   Procedure Laterality Date    ABDOMEN SURGERY      hernia surgerys 3-4    CHOLECYSTECTOMY  2016    galbladder     COLONOSCOPY  2018    [facility], negative for polyps 10 year     ENDOSCOPIC ENDONASAL SURGERY Right 2023    Procedure: Right Endoscopic Sinus Surgery with removal of right sphenoid sinus fungal ball;  Surgeon: Lexy Novak MD;  Location: [facility]    ENT SURGERY      tonsilectomy when little     HERNIA REPAIR      SEPTOPLASTY, TURBINOPLASTY, COMBINED Bilateral 2023    Procedure: Bilateral Turbinate Reduction, Septoplasty;  Surgeon: Lexy Novak MD;  Location: [facility]       Current Outpatient Medications   Medication    aspirin (ASA) 325 MG EC tablet    atorvastatin (LIPITOR) 80 MG tablet    famotidine (PEPCID) 20 MG tablet    levETIRAcetam (KEPPRA) 500 MG tablet    metoprolol tartrate (LOPRESSOR) 25 MG tablet    Multiple Vitamin (MULTI-VITAMINS) TABS    nitroGLYcerin (NITROSTAT) 0.4 MG sublingual tablet    Probiotic Product (PROBIOTIC-10 PO)    triamcinolone (KENALOG) 0.1 % external cream    vitamin C 250 MG tablet    Vitamin D, Cholecalciferol, 25 MCG (1000 UT) CAPS     No current facility-administered medications for this visit.        No Known Allergies    Family History   Problem Relation Age of Onset    Coronary Artery Disease Father     Other Cancer Brother     Other Cancer Sister        Social History     Socioeconomic History    Marital status:      Spouse name: None    Number of children: None    Years of education: None    Highest education level: None   Tobacco Use    Smoking status: Former     Packs/day: 1.00     Years: 20.00     Additional pack years: 0.00     Total pack years: 20.00     Types: Cigarettes     Start date: 1971     Quit date: 1971     Years since quittin.2     Passive exposure: Past    Smokeless tobacco: Former     Types: Chew    Vaping Use    Vaping Use: Never used   Substance and Sexual Activity    Alcohol use: No    Drug use: No    Sexual activity: Not Currently     Birth control/protection: Male Surgical   Other Topics Concern    Parent/sibling w/ CABG, MI or angioplasty before 65F 55M? No     Social Determinants of Health     Financial Resource Strain: Low Risk  (1/10/2024)    Financial Resource Strain     Within the past 12 months, have you or your family members you live with been unable to get utilities (heat, electricity) when it was really needed?: No   Food Insecurity: Low Risk  (1/10/2024)    Food Insecurity     Within the past 12 months, did you worry that your food would run out before you got money to buy more?: No     Within the past 12 months, did the food you bought just not last and you didn t have money to get more?: No   Transportation Needs: Low Risk  (1/10/2024)    Transportation Needs     Within the past 12 months, has lack of transportation kept you from medical appointments, getting your medicines, non-medical meetings or appointments, work, or from getting things that you need?: No   Housing Stability: Low Risk  (1/10/2024)    Housing Stability     Do you have housing? : Yes     Are you worried about losing your housing?: No       ROS: 10 point ROS neg other than the symptoms noted above in the HPI.  EXAM  Constitutional: healthy, alert, and no distress    Psychiatric: mentation appears normal and affect normal/bright    VASCULAR:  -Dorsalis pedis pulse +2/4 b/l  -Posterior tibial pulse +2/4 b/l  -Capillary refill time < 3 seconds to b/l hallux  NEURO:  -Light touch sensation intact to b/l plantar forefoot  DERM:  -Skin temperature, texture and turgor WNL b/l  -Toenails elongated, thickened, dystrophic and discolored x 7  ---LEFT hallux, 2nd, and 3rd toenails moderately thickened and partially loose beneath the nail bed  ---RIGHT hallux, 2nd and 3rd toenails absent -- no open wounds and no drainage  -----RIGHT  hallux nail bed has mild, intermittent skin breakdown with mild serous drainage. No severe erythema, no ascending erythema, no calor, no purulence, no malodor, no other signs of infection.     -Moderate hyperkeratotic lesion on the bilateral medial rim of the heel  ---Cracks in skin but no current cracking through the skin  MSK:  -Muscle strength of ankles +5/5 for dorsiflexion, plantarflexion, ABDUction and ADDuction b/l    ============================================================    ASSESSMENT:    (L60.3) Onychodystrophy  (primary encounter diagnosis)    (S91.301A) Open wound of right foot, initial encounter        PLAN:  -Patient evaluated and examined. Treatment options discussed with no educational barriers noted.    -RIGHT hallux wound:  The matrixectomy site has not fully healed on the RIGHT dorsal hallux. The wound has minimal areas of skin breakdown with mild serous drainage. Applied Mepilex Ag and tape. Patient may change this dressing every two days. If wound does not heal within the next two weeks, then he is advised to call the clinic.    ---------------------------------------------------    Ingrown toenail(s):  -Discussed nail procedure options and etiologies and treatments for ingrown toenails. Conservatively, patient could opt for a slant back today and keep monitoring the toe since there are no SOI. Discussed risks and benefits and healing course of a nail border avulsion vs. Matrixectomy including post procedure infection or a non-healing wound, both of which could lead to a life threatening infection or amputation of the foot or leg or a proximal amputation. Patient understands the risks and benefits and has decided to proceed with the following:    -Matrixectomy of LEFT hallux, second and third toes: Written and verbal consent obtained after reviewing risks and benefits of the procedure. Patient understands that although phenol is used in attempt to prevent regrowth of the ingrown toenail,  "the nail can still grow back. There is also a risk of post procedure infection. A severe foot infection could lead to a proximal foot or leg amputation or loss of life, so the patient is advised to return to podiatry or the ED immediately if the patient notices any SOI. The patient is in agreement with this plan and wishes to proceed with the procedure. A time-out was performed to identify the correct patient, limb, digit and procedure.    An alcohol prep pad was applied to  to the base of the LEFT hallux, second and third toes. Each digit was injected with 1:1 of 2% Lidocaine plain and 0.5% marcaine plain in a ring block fashion at the base of the toe(s) (6mL in the hallux and 2.5mL in the second toe and 2.5mL in the third toe). Adequate local anesthesia was obtained. A ring tournicot was applied to the digit and a chloroprep was applied to the three toes. A freer was used to loosen the nail from the underlying nail bed. A hemostat was then used to remove the LEFT hallux, second and third toes in total. A total of three applications of phenol were applied for 30 seconds per application per toe. Each toe was rinsed thoroughly with alcohol. The tournicot was removed from the LEFT hallux, second and third toes and there was a prompt hyperemic response to the LEFT hallux, second and third toes. The wound was then dressed with an Silvadene, gauze and 1\" coban. The patient was educated on after procedure care including daily epsom salt soaks starting tomorrow followed by dressing of the toe with an antibiotic cream and a bandaid until the wound site on the toe stops draining (2-3 weeks). Provided education on how to look for signs of infection (redness, swelling, pain, purulence, fever, chills, nausea, vomiting) and the patient was instructed to return to the clinic or Emergency Department immediately if there are any signs of infection.  -If there is any delay in healing into April, then he is advised to contact the " clinic. He is in agreement with this plan.    -Patient in agreement with the above treatment plan and all of patient's questions were answered.      Return to clinic as needed        Savita Her DPM

## 2024-03-06 NOTE — PATIENT INSTRUCTIONS
Nail procedure care:  -Start epsom salt soaks tomorrow. Soak the foot 1-2 times a day for 20 minutes.  -Apply an antibiotic cream, gauze and a bandaid over the toe for the first week after the procedure.  -After one week, continue the epsom salt soaks, but switch to a betadine dressing. Apply a small amount of betadine on gauze or dab the betadine over the toe with gauze and apply another dry gauze over the toe followed by a band aid or tape.  -Do not apply a band aid directly over the nail procedure site without gauze or it will trap too much moisture beneath the band aid.  Note: Continue the epsom salt soaks and dressings every day until the wound is fully healed. You should not see drainage on the bandage for at least two days in a row. Call the clinic if the wound is still moderately draining two weeks after the procedure.    Keep the toe covered at all times until it is completely healed.  -You may develop a black scab over the nail bed--let this fall off on its own and don't pick at it.  -The toe may drain for 2-3 weeks. It is normal for it to have a clear drainage.    Watching for signs of infection:  If the toe has a thick, white pus coming from the procedure site or if the the toe becomes red, swollen, painful, or you begin to feel sick (fever/chills/nausea/vomitting), return to the podiatry clinic immediately or to the Emergency Department room if after hours.      Podiatry can be reached directly at [phone]. Leave a voicemail if there is not an answer.

## 2024-04-02 ENCOUNTER — OFFICE VISIT (OUTPATIENT)
Dept: DERMATOLOGY | Facility: OTHER | Age: 74
End: 2024-04-02
Attending: NURSE PRACTITIONER
Payer: COMMERCIAL

## 2024-04-02 VITALS
SYSTOLIC BLOOD PRESSURE: 112 MMHG | DIASTOLIC BLOOD PRESSURE: 58 MMHG | OXYGEN SATURATION: 97 % | RESPIRATION RATE: 18 BRPM | TEMPERATURE: 97.5 F | HEART RATE: 50 BPM

## 2024-04-02 DIAGNOSIS — L81.9 CHANGE IN PIGMENTED LESION OF FACE: ICD-10-CM

## 2024-04-02 PROCEDURE — 99202 OFFICE O/P NEW SF 15 MIN: CPT | Performed by: DERMATOLOGY

## 2024-04-02 PROCEDURE — G0463 HOSPITAL OUTPT CLINIC VISIT: HCPCS

## 2024-04-02 ASSESSMENT — PAIN SCALES - GENERAL: PAINLEVEL: NO PAIN (0)

## 2024-04-02 NOTE — LETTER
4/2/2024       RE: Jim Brown  [address]   [address]  [facility] [address]     Dear Colleague,    Thank you for referring your patient, Jim Brown, to the [facility]. Please see a copy of my visit note below.    S: First visit for Jim who comes in with his wife he would like a general skin check.  There is no history of cancer simply a desire to be checked out for well.    O: Has long white hair it covers much of his neck and face.  There is 1 lesion on the chest that suggest an irritated seborrheic keratosis.  Examination of the face neck chest back arms and legs reveal no worrisome lesions no actinic keratoses no sun changes no skin cancers    A: Very healthy skin for a 74-year-old who does spend a good time out of doors but who covers up very well.    P: Reassured.  Congratulating him for his excellent skin quality at this stage.    Return as needed. 15 minutes spent in examination discussion and charting    Again, thank you for allowing me to participate in the care of your patient.      Sincerely,    MARYCRUZ Isaac MD

## 2024-04-03 NOTE — PROGRESS NOTES
S: First visit for Jim who comes in with his wife he would like a general skin check.  There is no history of cancer simply a desire to be checked out for well.    O: Has long white hair it covers much of his neck and face.  There is 1 lesion on the chest that suggest an irritated seborrheic keratosis.  Examination of the face neck chest back arms and legs reveal no worrisome lesions no actinic keratoses no sun changes no skin cancers    A: Very healthy skin for a 74-year-old who does spend a good time out of doors but who covers up very well.    P: Reassured.  Congratulating him for his excellent skin quality at this stage.    Return as needed. 15 minutes spent in examination discussion and charting

## 2024-04-03 NOTE — PROGRESS NOTES
"Otolaryngology Progress Note      Presents for their postoperative visit with me status post endoscopic sinus surgery     Procedures 1/31/23:    1.  Right sphenoidotomy with removal sphenoid sinus fungal ball  2.  Right total ethmoidectomy  3.  Right frontal sinusotomy  4.  Endoscopic septoplasty  5.  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 Need to Blow Nose: (P) Very mild  2. Nasal Blockage: (P) None  3. Sneezing: (P) None  4. Runny Nose: (P) Mild or slight  5. Cough: (P) None  6. Post-nasal discharge: (P) None  7. Thick nasal discharge: (P) None  8. Ear fullness: (P) None  9. Dizziness: (P) Severe  10. Ear Pain: (P) None  11. Facial pain/pressure: (P) None  12. Decreased Sense of Smell/Taste: (P) None  13. Difficulty falling asleep: (P) None  14. Wake up at night: (P) None  15. Lack of a good night's sleep: (P) Moderate  16. Wake up tired: (P) None  17. Fatigue: (P) None  18. Reduced Productivity: (P) None  19. Reduced Concentration: (P) None  20. Frustrated/restless/irritable: (P) None  21. Sad: (P) None  22. Embarrassed: (P) None    Total Score: (P) 10    COPYRIGHT 1996. [facility] [place], [place]        Postoperative course complicated by development of pancreatitis, acute coronary syndrome and sepsis    Physical Exam  /73 (BP Location: Left arm, Patient Position: Sitting, Cuff Size: Adult Large)   Pulse (!) 48   Temp 97.6  F (36.4  C) (Temporal)   Resp 18   Ht 1.829 m (6' 0.01\")   Wt 99.2 kg (218 lb 11.1 oz)   SpO2 95%   BMI 29.65 kg/m        General - The patient is well nourished and well developed, and appears to have good nutritional status.  Alert and oriented to person and place, interactive.  Head and Face - Normocephalic and atraumatic, with no gross " asymmetry noted of the contour of the facial features.  The facial nerve is intact, with strong symmetric movements.  Eyes - Extraocular movements intact.   Nose - Nasal mucosa is pink and moist with no abnormal mucus.  The septum was grossly midline, turbinates lateralized  No polyps, masses, or purulence noted on examination.      To evaluate the nose and sinuses in the post operative state, I performed rigid nasal endoscopy. The nose was anesthetized with home afrin or topical lidocaine and neosynephrine in the office.    I began with the LEFT side using a 0 degree rigid nasal endoscope, and then similarly examined the RIGHT side    Findings:  Inferior turbinates: lateralized  Septum midline  Middle turbinate and middle meatus:  No purulence, no polyposis, no synechiae  Right: Frontal recess ethmoid cavity and sphenoid are widely patent and clear the mucosa is healthy there is no recurrent fungal ball  Bilaterally, the lateral nasal wall is clear without mucus or polyposis  The superior meatus and frontal recess are clear  The sphenoethmoid recess is clear  The nasopharynx is clear  Mucosa is healthy throughout without polyps nor polypoid degeneration    Impression/Plan    1. 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

## 2024-04-04 ENCOUNTER — OFFICE VISIT (OUTPATIENT)
Dept: OTOLARYNGOLOGY | Facility: OTHER | Age: 74
End: 2024-04-04
Attending: OTOLARYNGOLOGY
Payer: MEDICARE

## 2024-04-04 VITALS
RESPIRATION RATE: 18 BRPM | HEIGHT: 72 IN | SYSTOLIC BLOOD PRESSURE: 119 MMHG | DIASTOLIC BLOOD PRESSURE: 73 MMHG | WEIGHT: 218.7 LBS | TEMPERATURE: 97.6 F | BODY MASS INDEX: 29.62 KG/M2 | OXYGEN SATURATION: 95 % | HEART RATE: 48 BPM

## 2024-04-04 DIAGNOSIS — Z98.890 S/P FESS (FUNCTIONAL ENDOSCOPIC SINUS SURGERY): Primary | ICD-10-CM

## 2024-04-04 PROCEDURE — G0463 HOSPITAL OUTPT CLINIC VISIT: HCPCS | Mod: 25

## 2024-04-04 PROCEDURE — 31231 NASAL ENDOSCOPY DX: CPT | Performed by: OTOLARYNGOLOGY

## 2024-04-04 PROCEDURE — 99213 OFFICE O/P EST LOW 20 MIN: CPT | Mod: 25 | Performed by: OTOLARYNGOLOGY

## 2024-04-04 RX ORDER — METOPROLOL TARTRATE 50 MG
1 TABLET ORAL
COMMUNITY
Start: 2024-01-03 | End: 2024-05-02 | Stop reason: DRUGHIGH

## 2024-04-04 ASSESSMENT — PAIN SCALES - GENERAL: PAINLEVEL: NO PAIN (0)

## 2024-04-04 NOTE — LETTER
"    4/4/2024         RE: Jim Brown  [address]   [address] [place] [id number]        Dear Colleague,    Thank you for referring your patient, Jim Brown, to the [facility]. Please see a copy of my visit note below.    Otolaryngology Progress Note      Presents for their postoperative visit with me status post endoscopic sinus surgery     Procedures 1/31/23:    1.  Right sphenoidotomy with removal sphenoid sinus fungal ball  2.  Right total ethmoidectomy  3.  Right frontal sinusotomy  4.  Endoscopic septoplasty  5.  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 Need to Blow Nose: (P) Very mild  2. Nasal Blockage: (P) None  3. Sneezing: (P) None  4. Runny Nose: (P) Mild or slight  5. Cough: (P) None  6. Post-nasal discharge: (P) None  7. Thick nasal discharge: (P) None  8. Ear fullness: (P) None  9. Dizziness: (P) Severe  10. Ear Pain: (P) None  11. Facial pain/pressure: (P) None  12. Decreased Sense of Smell/Taste: (P) None  13. Difficulty falling asleep: (P) None  14. Wake up at night: (P) None  15. Lack of a good night's sleep: (P) Moderate  16. Wake up tired: (P) None  17. Fatigue: (P) None  18. Reduced Productivity: (P) None  19. Reduced Concentration: (P) None  20. Frustrated/restless/irritable: (P) None  21. Sad: (P) None  22. Embarrassed: (P) None    Total Score: (P) 10    COPYRIGHT 1996. [facility] [place]        Postoperative course complicated by development of pancreatitis, acute coronary syndrome and sepsis    Physical Exam  /73 (BP Location: Left arm, Patient Position: Sitting, Cuff Size: Adult Large)   Pulse (!) 48   Temp 97.6  F (36.4  C) (Temporal)   Resp 18   Ht 1.829 m (6' 0.01\")   Wt 99.2 kg (218 lb 11.1 oz)  "  SpO2 95%   BMI 29.65 kg/m        General - The patient is well nourished and well developed, and appears to have good nutritional status.  Alert and oriented to person and place, interactive.  Head and Face - Normocephalic and atraumatic, with no gross asymmetry noted of the contour of the facial features.  The facial nerve is intact, with strong symmetric movements.  Eyes - Extraocular movements intact.   Nose - Nasal mucosa is pink and moist with no abnormal mucus.  The septum was grossly midline, turbinates lateralized  No polyps, masses, or purulence noted on examination.      To evaluate the nose and sinuses in the post operative state, I performed rigid nasal endoscopy. The nose was anesthetized with home afrin or topical lidocaine and neosynephrine in the office.    I began with the LEFT side using a 0 degree rigid nasal endoscope, and then similarly examined the RIGHT side    Findings:  Inferior turbinates: lateralized  Septum midline  Middle turbinate and middle meatus:  No purulence, no polyposis, no synechiae  Right: Frontal recess ethmoid cavity and sphenoid are widely patent and clear the mucosa is healthy there is no recurrent fungal ball  Bilaterally, the lateral nasal wall is clear without mucus or polyposis  The superior meatus and frontal recess are clear  The sphenoethmoid recess is clear  The nasopharynx is clear  Mucosa is healthy throughout without polyps nor polypoid degeneration    Impression/Plan    1. 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        Sincerely,        Lexy Novak MD

## 2024-04-04 NOTE — PATIENT INSTRUCTIONS
Thank you for allowing Dr. Novak and our ENT team to participate in your care.  If your medications are too expensive, please give the nurse a call.  We can possibly change this medication.  If you have a scheduling or an appointment question please contact our Health Unit Coordinator at their direct line [phone].   ALL nursing questions or concerns can be directed to your ENT nurse, Ishaan, at: [phone]  Follow-up prn

## 2024-04-08 ENCOUNTER — TELEPHONE (OUTPATIENT)
Dept: FAMILY MEDICINE | Facility: OTHER | Age: 74
End: 2024-04-08

## 2024-04-08 DIAGNOSIS — F40.243 ANXIETY WITH FLYING: Primary | ICD-10-CM

## 2024-04-08 RX ORDER — LORAZEPAM 0.5 MG/1
TABLET ORAL
Qty: 4 TABLET | Refills: 0 | Status: SHIPPED | OUTPATIENT
Start: 2024-04-08

## 2024-04-08 NOTE — TELEPHONE ENCOUNTER
Reason for call:  Medication      Have you contacted your pharmacy? No   If patient has contacted Pharmacy and it has been over 72hrs, continue to #2  Medication Patient is requesting to have medication for when he flies 04/24 coming back on 05/04. He would like this prescribed for flights.  What Pharmacy do you use? [facility] in [place]      (Please note that the turn-around-time for prescriptions is 72 business hours; I am sending your request at this time. SEND TO  [facility]  )

## 2024-04-29 NOTE — PROGRESS NOTES
"  Assessment & Plan     1. Hyperlipidemia LDL goal <100  Continue lipitor  - Lipid Profile; Future  - TSH with free T4 reflex; Future    2. Hypertensive heart disease with congestive heart failure, unspecified heart failure type (H)  Cardiology notes reviewed.   - Comprehensive metabolic panel; Future  - metoprolol tartrate (LOPRESSOR) 25 MG tablet; Take 1 tablet (25 mg) by mouth 2 times daily    3. Seizure disorder (H)  Neurology notes reviewed, continue keppra    4. Gastroesophageal reflux disease with esophagitis without hemorrhage  Continue pepcid.     5. Pulmonary nodule  Previous CT reviewed.   - CT Chest w/o Contrast; Future    6. On statin therapy  - Comprehensive metabolic panel; Future      BMI  Estimated body mass index is 28.75 kg/m  as calculated from the following:    Height as of 4/4/24: 1.829 m (6' 0.01\").    Weight as of this encounter: 96.2 kg (212 lb).   Weight management plan: Discussed healthy diet and exercise guidelines          No follow-ups on file.    The longitudinal plan of care for the diagnosis(es)/condition(s) as documented were addressed during this visit. Due to the added complexity in care, I will continue to support Jim in the subsequent management and with ongoing continuity of care.    Izzy Wagner,   Certified Adult Nurse Practitioner  [phone]      Subjective   Jim is a 74 year old, presenting for the following health issues:  Chronic Disease Management    HPI     Hyperlipidemia Follow-Up    Are you regularly taking any medication or supplement to lower your cholesterol?   Yes- Lipitor 80 mg  Are you having muscle aches or other side effects that you think could be caused by your cholesterol lowering medication?  No    Hypertension Follow-up    Do you check your blood pressure regularly outside of the clinic? No   Are you following a low salt diet? Yes  Are your blood pressures ever more than 140 on the top number (systolic) OR more   than 90 on the bottom number " (diastolic), for example 140/90? No Does not check outside of clinic     Vascular Disease Follow-up    How often do you take nitroglycerin? Never  Do you take an aspirin every day? Yes    He received a letter from [facility] regarding and incidental pulmonology node that was found on a chest CT during last year's admission for new onset atrial fib.  It is recommended to repeat chest CT for evaluation of this nodule.      BP Readings from Last 3 Encounters:   05/02/24 106/71   04/04/24 119/73   04/02/24 112/58    Wt Readings from Last 3 Encounters:   05/02/24 96.2 kg (212 lb)   04/04/24 99.2 kg (218 lb 11.1 oz)   02/02/24 99.2 kg (218 lb 11.2 oz)                 Review of Systems  Constitutional, neuro, ENT, endocrine, pulmonary, cardiac, gastrointestinal, genitourinary, musculoskeletal, integument and psychiatric systems are negative, except as otherwise noted.      Objective    /71 (BP Location: Left arm, Patient Position: Sitting, Cuff Size: Adult Large)   Pulse 51   Temp (!) 96.6  F (35.9  C) (Tympanic)   Resp 16   Wt 96.2 kg (212 lb)   SpO2 97%   BMI 28.75 kg/m    Body mass index is 28.75 kg/m .  Physical Exam   GENERAL: alert and no distress  NECK: no adenopathy, no asymmetry, masses, or scars, thyroid normal to palpation, and no carotid bruits  RESP: lungs clear to auscultation - no rales, rhonchi or wheezes  CV: regular rate and rhythm, normal S1 S2, no S3 or S4, no murmur,MS: no gross musculoskeletal defects noted, no edema  PSYCH: mentation appears normal, affect normal/bright          Signed Electronically by: Izzy Wagner NP

## 2024-05-02 ENCOUNTER — OFFICE VISIT (OUTPATIENT)
Dept: FAMILY MEDICINE | Facility: OTHER | Age: 74
End: 2024-05-02
Attending: NURSE PRACTITIONER
Payer: COMMERCIAL

## 2024-05-02 VITALS
TEMPERATURE: 96.6 F | HEART RATE: 51 BPM | SYSTOLIC BLOOD PRESSURE: 106 MMHG | DIASTOLIC BLOOD PRESSURE: 71 MMHG | RESPIRATION RATE: 16 BRPM | OXYGEN SATURATION: 97 % | WEIGHT: 212 LBS | BODY MASS INDEX: 28.75 KG/M2

## 2024-05-02 DIAGNOSIS — R91.1 PULMONARY NODULE: ICD-10-CM

## 2024-05-02 DIAGNOSIS — E78.5 HYPERLIPIDEMIA LDL GOAL <100: Primary | ICD-10-CM

## 2024-05-02 DIAGNOSIS — G40.909 SEIZURE DISORDER (H): ICD-10-CM

## 2024-05-02 DIAGNOSIS — I11.0 HYPERTENSIVE HEART DISEASE WITH CONGESTIVE HEART FAILURE, UNSPECIFIED HEART FAILURE TYPE (H): ICD-10-CM

## 2024-05-02 DIAGNOSIS — K21.00 GASTROESOPHAGEAL REFLUX DISEASE WITH ESOPHAGITIS WITHOUT HEMORRHAGE: ICD-10-CM

## 2024-05-02 DIAGNOSIS — Z79.899 ON STATIN THERAPY: ICD-10-CM

## 2024-05-02 LAB
ALBUMIN SERPL BCG-MCNC: 4 G/DL (ref 3.5–5.2)
ALP SERPL-CCNC: 77 U/L (ref 40–150)
ALT SERPL W P-5'-P-CCNC: 24 U/L (ref 0–70)
ANION GAP SERPL CALCULATED.3IONS-SCNC: 10 MMOL/L (ref 7–15)
AST SERPL W P-5'-P-CCNC: 25 U/L (ref 0–45)
BILIRUB SERPL-MCNC: 0.4 MG/DL
BUN SERPL-MCNC: 13.8 MG/DL (ref 8–23)
CALCIUM SERPL-MCNC: 9.2 MG/DL (ref 8.8–10.2)
CHLORIDE SERPL-SCNC: 100 MMOL/L (ref 98–107)
CHOLEST SERPL-MCNC: 117 MG/DL
CREAT SERPL-MCNC: 0.93 MG/DL (ref 0.67–1.17)
DEPRECATED HCO3 PLAS-SCNC: 28 MMOL/L (ref 22–29)
EGFRCR SERPLBLD CKD-EPI 2021: 86 ML/MIN/1.73M2
FASTING STATUS PATIENT QL REPORTED: NO
GLUCOSE SERPL-MCNC: 97 MG/DL (ref 70–99)
HDLC SERPL-MCNC: 55 MG/DL
HOLD SPECIMEN: NORMAL
LDLC SERPL CALC-MCNC: 54 MG/DL
NONHDLC SERPL-MCNC: 62 MG/DL
POTASSIUM SERPL-SCNC: 4 MMOL/L (ref 3.4–5.3)
PROT SERPL-MCNC: 6.7 G/DL (ref 6.4–8.3)
SODIUM SERPL-SCNC: 138 MMOL/L (ref 135–145)
TRIGL SERPL-MCNC: 41 MG/DL
TSH SERPL DL<=0.005 MIU/L-ACNC: 1.82 UIU/ML (ref 0.3–4.2)

## 2024-05-02 PROCEDURE — G2211 COMPLEX E/M VISIT ADD ON: HCPCS | Performed by: NURSE PRACTITIONER

## 2024-05-02 PROCEDURE — G0463 HOSPITAL OUTPT CLINIC VISIT: HCPCS

## 2024-05-02 PROCEDURE — 99214 OFFICE O/P EST MOD 30 MIN: CPT | Performed by: NURSE PRACTITIONER

## 2024-05-02 PROCEDURE — 84443 ASSAY THYROID STIM HORMONE: CPT | Mod: ZL | Performed by: NURSE PRACTITIONER

## 2024-05-02 PROCEDURE — 36415 COLL VENOUS BLD VENIPUNCTURE: CPT | Mod: ZL | Performed by: NURSE PRACTITIONER

## 2024-05-02 PROCEDURE — 80061 LIPID PANEL: CPT | Mod: ZL | Performed by: NURSE PRACTITIONER

## 2024-05-02 PROCEDURE — 80053 COMPREHEN METABOLIC PANEL: CPT | Mod: ZL | Performed by: NURSE PRACTITIONER

## 2024-05-02 RX ORDER — METOPROLOL TARTRATE 25 MG/1
25 TABLET, FILM COATED ORAL 2 TIMES DAILY
COMMUNITY
Start: 2024-05-02 | End: 2024-08-05

## 2024-05-02 ASSESSMENT — PAIN SCALES - GENERAL: PAINLEVEL: NO PAIN (0)

## 2024-05-02 ASSESSMENT — PATIENT HEALTH QUESTIONNAIRE - PHQ9: SUM OF ALL RESPONSES TO PHQ QUESTIONS 1-9: 0

## 2024-05-02 NOTE — PATIENT INSTRUCTIONS
Assessment & Plan     1. Hyperlipidemia LDL goal <100  Continue lipitor  - Lipid Profile; Future  - TSH with free T4 reflex; Future    2. Hypertensive heart disease with congestive heart failure, unspecified heart failure type (H)  Cardiology notes reviewed.   - Comprehensive metabolic panel; Future  - metoprolol tartrate (LOPRESSOR) 25 MG tablet; Take 1 tablet (25 mg) by mouth 2 times daily    3. Seizure disorder (H)  Neurology notes reviewed, continue keppra    4. Gastroesophageal reflux disease with esophagitis without hemorrhage  Continue pepcid.     5. Pulmonary nodule  Previous CT reviewed.   - CT Chest w/o Contrast; Future    6. On statin therapy  - Comprehensive metabolic panel; Future    Follow-up 3 months or as needed    Izzy Wagner,   Certified Adult Nurse Practitioner  [phone]

## 2024-05-07 ENCOUNTER — HOSPITAL ENCOUNTER (OUTPATIENT)
Dept: CT IMAGING | Facility: HOSPITAL | Age: 74
Discharge: HOME | End: 2024-05-07
Attending: NURSE PRACTITIONER | Admitting: NURSE PRACTITIONER
Payer: MEDICARE

## 2024-05-07 DIAGNOSIS — R91.1 PULMONARY NODULE: ICD-10-CM

## 2024-05-07 PROCEDURE — 71250 CT THORAX DX C-: CPT | Mod: MF

## 2024-05-22 NOTE — PATIENT INSTRUCTIONS
Sinuses look well. Right sinuses is clear. No swelling or changes noted.   Use Rinses as needed  Follow up in April with Dr. Novak.     Recommend hearing test/ audiogram  Check with insurance for hearing aid coverage.       Thank you for allowing Rupal Dinero PA-C and our ENT team to participate in your care.  If your medications are too expensive, please give the nurse a call.  We can possibly change this medication.  If you have a scheduling or an appointment question please contact our Health Unit Coordinator at [phone], Ext. [phone].    ALL nursing questions or concerns can be directed to your ENT nurse at: [phone] Deann     What Type Of Note Output Would You Prefer (Optional)?: Bullet Format Hpi Title: Evaluation of Skin Lesions

## 2024-07-23 DIAGNOSIS — K21.00 GASTROESOPHAGEAL REFLUX DISEASE WITH ESOPHAGITIS WITHOUT HEMORRHAGE: ICD-10-CM

## 2024-07-23 RX ORDER — FAMOTIDINE 20 MG/1
20 TABLET, FILM COATED ORAL 2 TIMES DAILY PRN
Qty: 90 TABLET | Refills: 2 | Status: SHIPPED | OUTPATIENT
Start: 2024-07-23

## 2024-07-31 NOTE — PROGRESS NOTES
Assessment & Plan     1. Hyperlipidemia LDL goal <100  - Lipid Profile; Future  - TSH with free T4 reflex; Future  - CBC with Platelets & Differential; Future  - atorvastatin (LIPITOR) 80 MG tablet; Take 1 tablet (80 mg) by mouth at bedtime  Dispense: 90 tablet; Refill: 1    2. Hypertensive heart disease with congestive heart failure, unspecified heart failure type (H)  - cardiology notes reviewed, medication list updated.   - Comprehensive metabolic panel; Future    3. Gastroesophageal reflux disease with esophagitis without hemorrhage  Continue pepcid    4. Seizure disorder (H)  Continue keppra    5. On statin therapy  - Comprehensive metabolic panel; Future    6. Class 1 obesity due to excess calories with serious comorbidity and body mass index (BMI) of 30.0 to 30.9 in adult  Weight loss encouraged  Continue regular exercise.          Return in about 3 months (around 11/5/2024) for hypertension and lipids, seizures.    The longitudinal plan of care for the diagnosis(es)/condition(s) as documented were addressed during this visit. Due to the added complexity in care, I will continue to support Jim in the subsequent management and with ongoing continuity of care.    Izzy Wagner,   Certified Adult Nurse Practitioner  [phone]      Subjective   Jim is a 74 year old, presenting for the following health issues:  Hypertension, Lipids, and Heart Failure        8/5/2024     9:16 AM   Additional Questions   Accompanied by self     History of Present Illness       Reason for visit:  ?    He eats 2-3 servings of fruits and vegetables daily.He consumes 0 sweetened beverage(s) daily.He exercises with enough effort to increase his heart rate 60 or more minutes per day.  He exercises with enough effort to increase his heart rate 6 days per week.   He is taking medications regularly.       Hyperlipidemia Follow-Up    Are you regularly taking any medication or supplement to lower your cholesterol?   Yes-  atorvastatin  Are you having muscle aches or other side effects that you think could be caused by your cholesterol lowering medication?  No    Hypertension Follow-up    Do you check your blood pressure regularly outside of the clinic? No   Are you following a low salt diet? No  Are your blood pressures ever more than 140 on the top number (systolic) OR more   than 90 on the bottom number (diastolic), for example 140/90? No  Cardiology stopped metoprolol.      Heart Failure Follow-up   Are you experiencing any shortness of breath? No  Are you experiencing any swelling in your legs or feet?  No  Are you using more pillows than usual? No  Do you cough at night?  No  Do you check your weight daily?  Yes  Have you had a weight change recently?  No  Are you having any of the following side effects from your medications? (Select all that apply)  The patient does not report symptoms of dizziness, fatigue, cough, swelling, or slow heart beat.  Since your last visit, how many times have you gone to the cardiologist, urgent care, emergency room, or hospital because of your heart failure?   None  Last Echo: No results found.        Recent Labs   Lab Test 05/02/24  1342 01/17/24  1348 11/01/23  1126 07/31/23  1007 01/24/23  1106 11/10/22  1420 11/03/21  1357 04/29/21  1534 08/25/20  1634   A1C  --   --   --   --   --  5.4  --   --   --    LDL 54  --  51 46   < >  --    < > 86 87   HDL 55  --  60 62   < >  --    < > 66 53   TRIG 41  --  33 34   < >  --    < > 48 52   ALT 24 22 17 16   < >  --    < > 18  --    CR 0.93 0.89 0.95 0.90   < >  --    < > 0.82 0.76   GFRESTIMATED 86 90 85 90   < >  --    < > 89 >90   GFRESTBLACK  --   --   --   --   --   --   --  >90 >90   POTASSIUM 4.0 3.7 4.1 4.0   < >  --    < > 4.1 3.9   TSH 1.82  --  1.67 1.69   < >  --    < > 1.53 1.57    < > = values in this interval not displayed.      BP Readings from Last 3 Encounters:   08/05/24 118/70   05/02/24 106/71   04/04/24 119/73    Wt Readings from  Last 3 Encounters:   08/05/24 101.5 kg (223 lb 12.8 oz)   05/02/24 96.2 kg (212 lb)   04/04/24 99.2 kg (218 lb 11.1 oz)               Review of Systems  Constitutional, neuro, ENT, endocrine, pulmonary, cardiac, gastrointestinal, genitourinary, musculoskeletal, integument and psychiatric systems are negative, except as otherwise noted.      Objective    /70 (BP Location: Left arm, Patient Position: Sitting, Cuff Size: Adult Regular)   Pulse 69   Temp 96.8  F (36  C) (Tympanic)   Resp 16   Wt 101.5 kg (223 lb 12.8 oz)   SpO2 98%   BMI 30.35 kg/m    Body mass index is 30.35 kg/m .  Physical Exam   GENERAL: alert and no distress  NECK: no adenopathy, no asymmetry, masses, or scars, thyroid normal to palpation, and no carotid bruits  RESP: lungs clear to auscultation - no rales, rhonchi or wheezes  CV: regular rate and rhythm, normal S1 S2, no S3 or S4, no murmur  MS: no gross musculoskeletal defects noted, no edema  PSYCH: mentation appears normal, affect normal/bright            Signed Electronically by: Izzy Wagner NP

## 2024-08-05 ENCOUNTER — MYC MEDICAL ADVICE (OUTPATIENT)
Dept: FAMILY MEDICINE | Facility: OTHER | Age: 74
End: 2024-08-05

## 2024-08-05 ENCOUNTER — OFFICE VISIT (OUTPATIENT)
Dept: FAMILY MEDICINE | Facility: OTHER | Age: 74
End: 2024-08-05
Attending: NURSE PRACTITIONER
Payer: COMMERCIAL

## 2024-08-05 VITALS
TEMPERATURE: 96.8 F | RESPIRATION RATE: 16 BRPM | DIASTOLIC BLOOD PRESSURE: 70 MMHG | OXYGEN SATURATION: 98 % | BODY MASS INDEX: 30.35 KG/M2 | WEIGHT: 223.8 LBS | HEART RATE: 69 BPM | SYSTOLIC BLOOD PRESSURE: 118 MMHG

## 2024-08-05 DIAGNOSIS — G40.909 SEIZURE DISORDER (H): ICD-10-CM

## 2024-08-05 DIAGNOSIS — Z79.899 ON STATIN THERAPY: ICD-10-CM

## 2024-08-05 DIAGNOSIS — K21.00 GASTROESOPHAGEAL REFLUX DISEASE WITH ESOPHAGITIS WITHOUT HEMORRHAGE: ICD-10-CM

## 2024-08-05 DIAGNOSIS — E66.811 CLASS 1 OBESITY DUE TO EXCESS CALORIES WITH SERIOUS COMORBIDITY AND BODY MASS INDEX (BMI) OF 30.0 TO 30.9 IN ADULT: ICD-10-CM

## 2024-08-05 DIAGNOSIS — E78.5 HYPERLIPIDEMIA LDL GOAL <100: Primary | ICD-10-CM

## 2024-08-05 DIAGNOSIS — I11.0 HYPERTENSIVE HEART DISEASE WITH CONGESTIVE HEART FAILURE, UNSPECIFIED HEART FAILURE TYPE (H): ICD-10-CM

## 2024-08-05 DIAGNOSIS — E66.09 CLASS 1 OBESITY DUE TO EXCESS CALORIES WITH SERIOUS COMORBIDITY AND BODY MASS INDEX (BMI) OF 30.0 TO 30.9 IN ADULT: ICD-10-CM

## 2024-08-05 LAB
ALBUMIN SERPL BCG-MCNC: 4.1 G/DL (ref 3.5–5.2)
ALP SERPL-CCNC: 75 U/L (ref 40–150)
ALT SERPL W P-5'-P-CCNC: 27 U/L (ref 0–70)
ANION GAP SERPL CALCULATED.3IONS-SCNC: 10 MMOL/L (ref 7–15)
AST SERPL W P-5'-P-CCNC: 29 U/L (ref 0–45)
BASOPHILS # BLD AUTO: 0 10E3/UL (ref 0–0.2)
BASOPHILS NFR BLD AUTO: 0 %
BILIRUB SERPL-MCNC: 0.3 MG/DL
BUN SERPL-MCNC: 15.7 MG/DL (ref 8–23)
CALCIUM SERPL-MCNC: 9.4 MG/DL (ref 8.8–10.4)
CHLORIDE SERPL-SCNC: 101 MMOL/L (ref 98–107)
CHOLEST SERPL-MCNC: 110 MG/DL
CREAT SERPL-MCNC: 0.89 MG/DL (ref 0.67–1.17)
EGFRCR SERPLBLD CKD-EPI 2021: 90 ML/MIN/1.73M2
EOSINOPHIL # BLD AUTO: 0 10E3/UL (ref 0–0.7)
EOSINOPHIL NFR BLD AUTO: 0 %
ERYTHROCYTE [DISTWIDTH] IN BLOOD BY AUTOMATED COUNT: 12.4 % (ref 10–15)
FASTING STATUS PATIENT QL REPORTED: YES
FASTING STATUS PATIENT QL REPORTED: YES
GLUCOSE SERPL-MCNC: 104 MG/DL (ref 70–99)
HCO3 SERPL-SCNC: 26 MMOL/L (ref 22–29)
HCT VFR BLD AUTO: 40.4 % (ref 40–53)
HDLC SERPL-MCNC: 59 MG/DL
HGB BLD-MCNC: 13.6 G/DL (ref 13.3–17.7)
IMM GRANULOCYTES # BLD: 0 10E3/UL
IMM GRANULOCYTES NFR BLD: 0 %
LDLC SERPL CALC-MCNC: 43 MG/DL
LYMPHOCYTES # BLD AUTO: 1.5 10E3/UL (ref 0.8–5.3)
LYMPHOCYTES NFR BLD AUTO: 33 %
MCH RBC QN AUTO: 32 PG (ref 26.5–33)
MCHC RBC AUTO-ENTMCNC: 33.7 G/DL (ref 31.5–36.5)
MCV RBC AUTO: 95 FL (ref 78–100)
MONOCYTES # BLD AUTO: 0.5 10E3/UL (ref 0–1.3)
MONOCYTES NFR BLD AUTO: 11 %
NEUTROPHILS # BLD AUTO: 2.5 10E3/UL (ref 1.6–8.3)
NEUTROPHILS NFR BLD AUTO: 56 %
NONHDLC SERPL-MCNC: 51 MG/DL
PLATELET # BLD AUTO: 208 10E3/UL (ref 150–450)
POTASSIUM SERPL-SCNC: 4.6 MMOL/L (ref 3.4–5.3)
PROT SERPL-MCNC: 7.4 G/DL (ref 6.4–8.3)
RBC # BLD AUTO: 4.25 10E6/UL (ref 4.4–5.9)
SODIUM SERPL-SCNC: 137 MMOL/L (ref 135–145)
TRIGL SERPL-MCNC: 38 MG/DL
TSH SERPL DL<=0.005 MIU/L-ACNC: 1.96 UIU/ML (ref 0.3–4.2)
WBC # BLD AUTO: 4.6 10E3/UL (ref 4–11)

## 2024-08-05 PROCEDURE — 36415 COLL VENOUS BLD VENIPUNCTURE: CPT | Mod: ZL | Performed by: NURSE PRACTITIONER

## 2024-08-05 PROCEDURE — G0463 HOSPITAL OUTPT CLINIC VISIT: HCPCS

## 2024-08-05 PROCEDURE — G2211 COMPLEX E/M VISIT ADD ON: HCPCS | Performed by: NURSE PRACTITIONER

## 2024-08-05 PROCEDURE — 82374 ASSAY BLOOD CARBON DIOXIDE: CPT | Mod: ZL | Performed by: NURSE PRACTITIONER

## 2024-08-05 PROCEDURE — 84443 ASSAY THYROID STIM HORMONE: CPT | Mod: ZL | Performed by: NURSE PRACTITIONER

## 2024-08-05 PROCEDURE — 82465 ASSAY BLD/SERUM CHOLESTEROL: CPT | Mod: ZL | Performed by: NURSE PRACTITIONER

## 2024-08-05 PROCEDURE — 85025 COMPLETE CBC W/AUTO DIFF WBC: CPT | Mod: ZL | Performed by: NURSE PRACTITIONER

## 2024-08-05 PROCEDURE — 99214 OFFICE O/P EST MOD 30 MIN: CPT | Performed by: NURSE PRACTITIONER

## 2024-08-05 RX ORDER — ATORVASTATIN CALCIUM 80 MG/1
80 TABLET, FILM COATED ORAL AT BEDTIME
Qty: 90 TABLET | Refills: 1 | Status: SHIPPED | OUTPATIENT
Start: 2024-08-05

## 2024-08-05 ASSESSMENT — PAIN SCALES - GENERAL: PAINLEVEL: NO PAIN (0)

## 2024-08-05 NOTE — PATIENT INSTRUCTIONS
Assessment & Plan     1. Hyperlipidemia LDL goal <100  - Lipid Profile; Future  - TSH with free T4 reflex; Future  - CBC with Platelets & Differential; Future  - atorvastatin (LIPITOR) 80 MG tablet; Take 1 tablet (80 mg) by mouth at bedtime  Dispense: 90 tablet; Refill: 1    2. Hypertensive heart disease with congestive heart failure, unspecified heart failure type (H)  - Comprehensive metabolic panel; Future    3. Gastroesophageal reflux disease with esophagitis without hemorrhage  Continue pepcid    4. Seizure disorder (H)  Continue keppra    5. On statin therapy  - Comprehensive metabolic panel; Future    6. Class 1 obesity due to excess calories with serious comorbidity and body mass index (BMI) of 30.0 to 30.9 in adult  Weight loss encouraged  Continue regular exercise.          Return in about 3 months (around 11/5/2024) for hypertension and lipids, seizures.    Izzy Wagner,   Certified Adult Nurse Practitioner  [phone]

## 2024-08-22 NOTE — PROGRESS NOTES
Assessment & Plan     1. Hypertensive heart disease with congestive heart failure, unspecified heart failure type (H)  Previous cardiac notes, echo and labs reviewed.   I believe he had a normal response to physical exertion in warm weather.  Heart rate increased but returned to baseline appropriately.  Declined zio patch or referral back to cardiology today.  He will continues to monitor and reach out if status alters.          Follow-up in November as scheduled.     The longitudinal plan of care for the diagnosis(es)/condition(s) as documented were addressed during this visit. Due to the added complexity in care, I will continue to support Jim in the subsequent management and with ongoing continuity of care.    Izzy Wagner,   Certified Adult Nurse Practitioner  [phone]      Subjective   Jim is a 74 year old, presenting for the following health issues:  Heart Problem (Heart rate concern)    HPI     Concern - Heart Rate  Onset: 4 days ago  Description: was driving posts into ground, went in and apple watch had heart rate of 148. Wife made appointment.  After resting 10 minutes heart rate was back down to 77.    Denies chest pain, shortness of breath, nausea, dizziness/lightheaded sensation.  He just noticed/commented on rate after extreme activity.  He is not concerned about anything, his wife was worried.  Denies zio patch today.    Intensity: mild  Progression of Symptoms:  improving  Accompanying Signs & Symptoms: none  Previous history of similar problem: none  Precipitating factors:        Worsened by: activity out in hot weather  Alleviating factors:        Improved by: None  Therapies tried and outcome: None - however betablocker was stopped 5 weeks ago. Denies palpitations.  HR has been in normal range otherwise.      Normal cardiac echo 2/2024.          Recent Labs   Lab Test 08/05/24  1005 05/02/24  1342 01/17/24  1348 11/01/23  1126 01/24/23  1106 11/10/22  1420 11/03/21  1357  04/29/21  1534 08/25/20  1634   A1C  --   --   --   --   --  5.4  --   --   --    LDL 43 54  --  51   < >  --    < > 86 87   HDL 59 55  --  60   < >  --    < > 66 53   TRIG 38 41  --  33   < >  --    < > 48 52   ALT 27 24 22 17   < >  --    < > 18  --    CR 0.89 0.93 0.89 0.95   < >  --    < > 0.82 0.76   GFRESTIMATED 90 86 90 85   < >  --    < > 89 >90   GFRESTBLACK  --   --   --   --   --   --   --  >90 >90   POTASSIUM 4.6 4.0 3.7 4.1   < >  --    < > 4.1 3.9   TSH 1.96 1.82  --  1.67   < >  --    < > 1.53 1.57    < > = values in this interval not displayed.      BP Readings from Last 3 Encounters:   08/26/24 112/77   08/05/24 118/70   05/02/24 106/71    Wt Readings from Last 3 Encounters:   08/26/24 99.8 kg (220 lb)   08/05/24 101.5 kg (223 lb 12.8 oz)   05/02/24 96.2 kg (212 lb)                Review of Systems  Constitutional, neuro, ENT, endocrine, pulmonary, cardiac, gastrointestinal, genitourinary, musculoskeletal, integument and psychiatric systems are negative, except as otherwise noted.      Objective    /77 (BP Location: Left arm, Patient Position: Sitting, Cuff Size: Adult Regular)   Pulse 68   Temp 98.3  F (36.8  C) (Tympanic)   Resp 16   Wt 99.8 kg (220 lb)   SpO2 97%   BMI 29.83 kg/m    Body mass index is 29.83 kg/m .  Physical Exam   GENERAL: alert and no distress  NECK: no adenopathy, no asymmetry, masses, or scars, thyroid normal to palpation, and no carotid bruits  RESP: lungs clear to auscultation - no rales, rhonchi or wheezes  CV: regular rate and rhythm, normal S1 S2, no S3 or S4, no murmur  MS: no gross musculoskeletal defects noted, no edema  PSYCH: mentation appears normal, affect normal/bright            Signed Electronically by: Izzy Wagner, NP

## 2024-08-26 ENCOUNTER — OFFICE VISIT (OUTPATIENT)
Dept: FAMILY MEDICINE | Facility: OTHER | Age: 74
End: 2024-08-26
Attending: NURSE PRACTITIONER
Payer: COMMERCIAL

## 2024-08-26 VITALS
WEIGHT: 220 LBS | HEART RATE: 68 BPM | DIASTOLIC BLOOD PRESSURE: 77 MMHG | TEMPERATURE: 98.3 F | OXYGEN SATURATION: 97 % | BODY MASS INDEX: 29.83 KG/M2 | SYSTOLIC BLOOD PRESSURE: 112 MMHG | RESPIRATION RATE: 16 BRPM

## 2024-08-26 DIAGNOSIS — I11.0 HYPERTENSIVE HEART DISEASE WITH CONGESTIVE HEART FAILURE, UNSPECIFIED HEART FAILURE TYPE (H): Primary | ICD-10-CM

## 2024-08-26 PROCEDURE — G2211 COMPLEX E/M VISIT ADD ON: HCPCS | Performed by: NURSE PRACTITIONER

## 2024-08-26 PROCEDURE — G0463 HOSPITAL OUTPT CLINIC VISIT: HCPCS

## 2024-08-26 PROCEDURE — 99213 OFFICE O/P EST LOW 20 MIN: CPT | Performed by: NURSE PRACTITIONER

## 2024-08-26 ASSESSMENT — PAIN SCALES - GENERAL: PAINLEVEL: NO PAIN (0)

## 2024-09-22 ENCOUNTER — HEALTH MAINTENANCE LETTER (OUTPATIENT)
Age: 74
End: 2024-09-22

## 2024-10-28 ENCOUNTER — ANCILLARY PROCEDURE (OUTPATIENT)
Dept: GENERAL RADIOLOGY | Facility: OTHER | Age: 74
End: 2024-10-28
Attending: NURSE PRACTITIONER
Payer: MEDICARE

## 2024-10-28 ENCOUNTER — OFFICE VISIT (OUTPATIENT)
Dept: FAMILY MEDICINE | Facility: OTHER | Age: 74
End: 2024-10-28
Attending: NURSE PRACTITIONER
Payer: COMMERCIAL

## 2024-10-28 VITALS
HEART RATE: 95 BPM | BODY MASS INDEX: 30.24 KG/M2 | TEMPERATURE: 97.3 F | DIASTOLIC BLOOD PRESSURE: 74 MMHG | WEIGHT: 223 LBS | RESPIRATION RATE: 18 BRPM | SYSTOLIC BLOOD PRESSURE: 109 MMHG | OXYGEN SATURATION: 96 %

## 2024-10-28 DIAGNOSIS — M54.50 ACUTE BILATERAL LOW BACK PAIN WITHOUT SCIATICA: ICD-10-CM

## 2024-10-28 DIAGNOSIS — S06.0X0D CONCUSSION WITHOUT LOSS OF CONSCIOUSNESS, SUBSEQUENT ENCOUNTER: ICD-10-CM
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
## 2024-10-28 PROCEDURE — 99214 OFFICE O/P EST MOD 30 MIN: CPT | Performed by: NURSE PRACTITIONER

## 2024-10-28 PROCEDURE — G2211 COMPLEX E/M VISIT ADD ON: HCPCS | Performed by: NURSE PRACTITIONER

## 2024-10-28 PROCEDURE — 72100 X-RAY EXAM L-S SPINE 2/3 VWS: CPT | Mod: TC,FY

## 2024-10-28 PROCEDURE — G0463 HOSPITAL OUTPT CLINIC VISIT: HCPCS

## 2024-10-28 ASSESSMENT — PATIENT HEALTH QUESTIONNAIRE - PHQ9
SUM OF ALL RESPONSES TO PHQ QUESTIONS 1-9: 2
10. IF YOU CHECKED OFF ANY PROBLEMS, HOW DIFFICULT HAVE THESE PROBLEMS MADE IT FOR YOU TO DO YOUR WORK, TAKE CARE OF THINGS AT HOME, OR GET ALONG WITH OTHER PEOPLE: NOT DIFFICULT AT ALL
SUM OF ALL RESPONSES TO PHQ QUESTIONS 1-9: 2

## 2024-10-28 ASSESSMENT — PAIN SCALES - GENERAL: PAINLEVEL_OUTOF10: SEVERE PAIN (7)

## 2024-10-28 NOTE — PROGRESS NOTES
"  Assessment & Plan     1. Fall at home, subsequent encounter (Primary)  ED notes reviewed  CT scan chest, head and cervical spine reviewed     2. Concussion without loss of consciousness, subsequent encounter  Slowly improving  Declined physical therapy referral to concussion program     3. Acute bilateral low back pain without sciatica  Ice, heat, stretching  Declined PT for now.    Tylenol as needed   - XR LUMBAR SPINE 2/3 VIEWS (Clinic Performed); Future - no acute changes noted, will await final report from radiology.            MED REC REQUIRED  Post Medication Reconciliation Status: discharge medications reconciled, continue medications without change  BMI  Estimated body mass index is 30.24 kg/m  as calculated from the following:    Height as of 4/4/24: 1.829 m (6' 0.01\").    Weight as of this encounter: 101.2 kg (223 lb).     Follow-up if no improvement or any worsening.     The longitudinal plan of care for the diagnosis(es)/condition(s) as documented were addressed during this visit. Due to the added complexity in care, I will continue to support Jim in the subsequent management and with ongoing continuity of care.    Izzy Wagner,   Certified Adult Nurse Practitioner  [phone]        Subjective   Jim is a 74 year old, presenting for the following health issues:  ER F/U    HPI     ED/UC Followup:    Facility:  [facility]   Date of visit: 10/25/24  Reason for visit: Fall concussion   Current Status: Still has dopey headed feeling,and B/L Low back pain.  Denies headache, nausea, vomiting or balance issues.          Review of Systems  Constitutional, neuro, ENT, endocrine, pulmonary, cardiac, gastrointestinal, genitourinary, musculoskeletal, integument and psychiatric systems are negative, except as otherwise noted.      Objective    /74 (BP Location: Left arm, Patient Position: Sitting, Cuff Size: Adult Large)   Pulse 95   Temp 97.3  F (36.3  C) (Tympanic)   Resp 18   Wt 101.2 " kg (223 lb)   SpO2 96%   BMI 30.24 kg/m    Body mass index is 30.24 kg/m .  Physical Exam   GENERAL: alert and no distress  NECK: no adenopathy, no asymmetry, masses, or scars, thyroid normal to palpation, and no carotid bruits  RESP: lungs clear to auscultation - no rales, rhonchi or wheezes  CV: regular rate and rhythm, normal S1 S2, no S3 or S4, no murmur, click or rub, no peripheral edema  MS: no gross musculoskeletal defects noted, no edema.  Bilateral paraspinal muscles tight, tender with palpation.    NEURO: Normal strength and tone, sensory exam grossly normal, mentation intact, and cranial nerves 2-12 intact  PSYCH: mentation appears normal, affect normal/bright    No results found for any visits on 10/28/24.          Signed Electronically by: Izzy Wagner NP

## 2024-11-27 DIAGNOSIS — H91.93 DECREASED HEARING OF BOTH EARS: Primary | ICD-10-CM

## 2024-12-04 ENCOUNTER — OFFICE VISIT (OUTPATIENT)
Dept: AUDIOLOGY | Facility: OTHER | Age: 74
End: 2024-12-04
Attending: AUDIOLOGIST
Payer: MEDICARE

## 2024-12-04 ENCOUNTER — ALLIED HEALTH/NURSE VISIT (OUTPATIENT)
Dept: AUDIOLOGY | Facility: OTHER | Age: 74
End: 2024-12-04
Attending: AUDIOLOGIST
Payer: MEDICARE

## 2024-12-04 DIAGNOSIS — H91.93 DECREASED HEARING OF BOTH EARS: ICD-10-CM

## 2024-12-04 DIAGNOSIS — H90.3 SENSORINEURAL HEARING LOSS (SNHL) OF BOTH EARS: Primary | ICD-10-CM

## 2024-12-04 DIAGNOSIS — H91.93 DECREASED HEARING OF BOTH EARS: Primary | ICD-10-CM

## 2024-12-04 PROCEDURE — 92556 SPEECH AUDIOMETRY COMPLETE: CPT | Performed by: AUDIOLOGIST

## 2024-12-04 PROCEDURE — 92552 PURE TONE AUDIOMETRY AIR: CPT | Performed by: AUDIOLOGIST

## 2024-12-04 NOTE — PROGRESS NOTES
Audiology Evaluation Completed. Please refer SCANNED AUDIOGRAM and/or TYMPANOGRAM for BACKGROUND, RESULTS, RECOMMENDATIONS.      Randa BLUE, [facility]  Audiologist #[id number]

## 2024-12-04 NOTE — PROGRESS NOTES
HEARING AID CHECK    BACKGROUND:  Jim Brown, a 74 year old male, was seen today for an hearing aid check.  Mr. Brown wears Binaural Oticon Real 3 miniRITE R hearing aids.  Mr. Brown reported no concerns.    FINDINGS:  Visual inspection and cleaning provided.   C-stop showed cerumen impaction and changed with new. 10 mm open domes changed with new. Battery was tested and good. Good listening check.    Reviewed cleaning, troubleshooting, and preventative care with patient. Any cleaning tools used were provided as customer courtesy.    Services performed and warranty reviewed with patient.    PLAN: Patient was seen next by the audiologist. Mr. Brown will return to clinic in 12 months, sooner if needed. Patient had no further questions and reports satisfaction.         Tereza Alcocer  Audiology Assistant  [facility]  [phone]

## 2024-12-17 NOTE — PROGRESS NOTES
"  Assessment & Plan     1. Idiopathic acute pancreatitis without infection or necrosis (Primary)  ED notes reviewed  CT scan reviewed  Labs reviewed.   - CBC with Platelets & Differential; Future  - Lipase; Future  - Amylase; Future  - Erythrocyte sedimentation rate auto; Future  - CRP, inflammation; Future  - Comprehensive metabolic panel; Future    Continue with bland diet, advance as tolerated.      Will notify of results as they are returned.     MED REC REQUIRED  Post Medication Reconciliation Status: discharge medications reconciled and changed, per note/orders  BMI  Estimated body mass index is 29.29 kg/m  as calculated from the following:    Height as of 4/4/24: 1.829 m (6' 0.01\").    Weight as of this encounter: 98 kg (216 lb).     Follow-up with any worsening or as needed.  To ED with acute symptoms or fever.      The longitudinal plan of care for the diagnosis(es)/condition(s) as documented were addressed during this visit. Due to the added complexity in care, I will continue to support Jim in the subsequent management and with ongoing continuity of care.    Izzy Wagner,   Certified Adult Nurse Practitioner  [phone]      Flory Fernandez is a 74 year old, presenting for the following health issues:  ER F/U    HPI     ED/UC Followup:    Facility:  [facility]   Date of visit: 12/12/24  Reason for visit: Epigastric pain - diagnosed with pancreatitis.    Current Status: Has not ate since 12/12/24 on liquid diet   Lipase was 473.  Will repeat today.       Recent Labs   Lab Test 08/05/24  1005 05/02/24  1342 01/17/24  1348 11/01/23  1126 01/24/23  1106 11/10/22  1420 11/03/21  1357 04/29/21  1534 08/25/20  1634   A1C  --   --   --   --   --  5.4  --   --   --    LDL 43 54  --  51   < >  --    < > 86 87   HDL 59 55  --  60   < >  --    < > 66 53   TRIG 38 41  --  33   < >  --    < > 48 52   ALT 27 24 22 17   < >  --    < > 18  --    CR 0.89 0.93 0.89 0.95   < >  --    < > 0.82 0.76 "   GFRESTIMATED 90 86 90 85   < >  --    < > 89 >90   GFRESTBLACK  --   --   --   --   --   --   --  >90 >90   POTASSIUM 4.6 4.0 3.7 4.1   < >  --    < > 4.1 3.9   TSH 1.96 1.82  --  1.67   < >  --    < > 1.53 1.57    < > = values in this interval not displayed.      BP Readings from Last 3 Encounters:   12/19/24 123/77   10/28/24 109/74   08/26/24 112/77    Wt Readings from Last 3 Encounters:   12/19/24 98 kg (216 lb)   10/28/24 101.2 kg (223 lb)   08/26/24 99.8 kg (220 lb)                Review of Systems  Constitutional, neuro, ENT, endocrine, pulmonary, cardiac, gastrointestinal, genitourinary, musculoskeletal, integument and psychiatric systems are negative, except as otherwise noted.      Objective    /77 (BP Location: Left arm, Patient Position: Sitting, Cuff Size: Adult Large)   Pulse 75   Temp 98.8  F (37.1  C) (Tympanic)   Resp 18   Wt 98 kg (216 lb)   SpO2 96%   BMI 29.29 kg/m    Body mass index is 29.29 kg/m .  Physical Exam   GENERAL: alert and no distress  RESP: lungs clear to auscultation - no rales, rhonchi or wheezes  CV: regular rate and rhythm, normal S1 S2, no S3 or S4, no murmur, click or rub, no peripheral edema  ABDOMEN: soft, nontender, no hepatosplenomegaly, no masses and bowel sounds normal  MS: no gross musculoskeletal defects noted, no edema  PSYCH: mentation appears normal, affect normal/bright            Signed Electronically by: Izzy Wagner, JUMANA

## 2024-12-19 ENCOUNTER — OFFICE VISIT (OUTPATIENT)
Dept: FAMILY MEDICINE | Facility: OTHER | Age: 74
End: 2024-12-19
Attending: NURSE PRACTITIONER
Payer: MEDICARE

## 2024-12-19 VITALS
RESPIRATION RATE: 18 BRPM | TEMPERATURE: 98.8 F | HEART RATE: 75 BPM | BODY MASS INDEX: 29.29 KG/M2 | WEIGHT: 216 LBS | OXYGEN SATURATION: 96 % | SYSTOLIC BLOOD PRESSURE: 123 MMHG | DIASTOLIC BLOOD PRESSURE: 77 MMHG

## 2024-12-19 DIAGNOSIS — K21.00 GASTROESOPHAGEAL REFLUX DISEASE WITH ESOPHAGITIS WITHOUT HEMORRHAGE: ICD-10-CM

## 2024-12-19 DIAGNOSIS — K85.00 IDIOPATHIC ACUTE PANCREATITIS WITHOUT INFECTION OR NECROSIS: Primary | ICD-10-CM

## 2024-12-19 LAB
ALBUMIN SERPL BCG-MCNC: 4.3 G/DL (ref 3.5–5.2)
ALP SERPL-CCNC: 126 U/L (ref 40–150)
ALT SERPL W P-5'-P-CCNC: 24 U/L (ref 0–70)
AMYLASE SERPL-CCNC: 80 U/L (ref 28–100)
ANION GAP SERPL CALCULATED.3IONS-SCNC: 9 MMOL/L (ref 7–15)
AST SERPL W P-5'-P-CCNC: 33 U/L (ref 0–45)
BASOPHILS # BLD AUTO: 0 10E3/UL (ref 0–0.2)
BASOPHILS NFR BLD AUTO: 0 %
BILIRUB SERPL-MCNC: 0.5 MG/DL
BUN SERPL-MCNC: 5.7 MG/DL (ref 8–23)
CALCIUM SERPL-MCNC: 9.6 MG/DL (ref 8.8–10.4)
CHLORIDE SERPL-SCNC: 101 MMOL/L (ref 98–107)
CREAT SERPL-MCNC: 1.04 MG/DL (ref 0.67–1.17)
CRP SERPL-MCNC: 3.95 MG/L
EGFRCR SERPLBLD CKD-EPI 2021: 75 ML/MIN/1.73M2
EOSINOPHIL # BLD AUTO: 0 10E3/UL (ref 0–0.7)
EOSINOPHIL NFR BLD AUTO: 0 %
ERYTHROCYTE [DISTWIDTH] IN BLOOD BY AUTOMATED COUNT: 13.4 % (ref 10–15)
ERYTHROCYTE [SEDIMENTATION RATE] IN BLOOD BY WESTERGREN METHOD: 25 MM/HR (ref 0–20)
GLUCOSE SERPL-MCNC: 112 MG/DL (ref 70–99)
HCO3 SERPL-SCNC: 28 MMOL/L (ref 22–29)
HCT VFR BLD AUTO: 39 % (ref 40–53)
HGB BLD-MCNC: 13 G/DL (ref 13.3–17.7)
IMM GRANULOCYTES # BLD: 0 10E3/UL
IMM GRANULOCYTES NFR BLD: 0 %
LIPASE SERPL-CCNC: 70 U/L (ref 13–60)
LYMPHOCYTES # BLD AUTO: 1.1 10E3/UL (ref 0.8–5.3)
LYMPHOCYTES NFR BLD AUTO: 28 %
MCH RBC QN AUTO: 30.6 PG (ref 26.5–33)
MCHC RBC AUTO-ENTMCNC: 33.3 G/DL (ref 31.5–36.5)
MCV RBC AUTO: 92 FL (ref 78–100)
MONOCYTES # BLD AUTO: 0.4 10E3/UL (ref 0–1.3)
MONOCYTES NFR BLD AUTO: 10 %
NEUTROPHILS # BLD AUTO: 2.5 10E3/UL (ref 1.6–8.3)
NEUTROPHILS NFR BLD AUTO: 62 %
PLATELET # BLD AUTO: 231 10E3/UL (ref 150–450)
POTASSIUM SERPL-SCNC: 4.3 MMOL/L (ref 3.4–5.3)
PROT SERPL-MCNC: 7.1 G/DL (ref 6.4–8.3)
RBC # BLD AUTO: 4.25 10E6/UL (ref 4.4–5.9)
SODIUM SERPL-SCNC: 138 MMOL/L (ref 135–145)
WBC # BLD AUTO: 4 10E3/UL (ref 4–11)

## 2024-12-19 PROCEDURE — 83690 ASSAY OF LIPASE: CPT | Mod: ZL | Performed by: NURSE PRACTITIONER

## 2024-12-19 PROCEDURE — 84155 ASSAY OF PROTEIN SERUM: CPT | Mod: ZL | Performed by: NURSE PRACTITIONER

## 2024-12-19 PROCEDURE — G0463 HOSPITAL OUTPT CLINIC VISIT: HCPCS

## 2024-12-19 PROCEDURE — 85041 AUTOMATED RBC COUNT: CPT | Mod: ZL | Performed by: NURSE PRACTITIONER

## 2024-12-19 PROCEDURE — 85004 AUTOMATED DIFF WBC COUNT: CPT | Mod: ZL | Performed by: NURSE PRACTITIONER

## 2024-12-19 PROCEDURE — 36415 COLL VENOUS BLD VENIPUNCTURE: CPT | Mod: ZL | Performed by: NURSE PRACTITIONER

## 2024-12-19 PROCEDURE — 86140 C-REACTIVE PROTEIN: CPT | Mod: ZL | Performed by: NURSE PRACTITIONER

## 2024-12-19 PROCEDURE — 84450 TRANSFERASE (AST) (SGOT): CPT | Mod: ZL | Performed by: NURSE PRACTITIONER

## 2024-12-19 PROCEDURE — 85652 RBC SED RATE AUTOMATED: CPT | Mod: ZL | Performed by: NURSE PRACTITIONER

## 2024-12-19 PROCEDURE — 82150 ASSAY OF AMYLASE: CPT | Mod: ZL | Performed by: NURSE PRACTITIONER

## 2024-12-19 RX ORDER — FAMOTIDINE 40 MG/1
40 TABLET, FILM COATED ORAL 2 TIMES DAILY PRN
COMMUNITY
Start: 2024-12-19

## 2024-12-19 ASSESSMENT — PAIN SCALES - GENERAL: PAINLEVEL_OUTOF10: NO PAIN (0)

## 2025-02-14 SDOH — HEALTH STABILITY: PHYSICAL HEALTH: ON AVERAGE, HOW MANY DAYS PER WEEK DO YOU ENGAGE IN MODERATE TO STRENUOUS EXERCISE (LIKE A BRISK WALK)?: 4 DAYS

## 2025-02-14 SDOH — HEALTH STABILITY: PHYSICAL HEALTH: ON AVERAGE, HOW MANY MINUTES DO YOU ENGAGE IN EXERCISE AT THIS LEVEL?: 20 MIN

## 2025-02-14 ASSESSMENT — SOCIAL DETERMINANTS OF HEALTH (SDOH): HOW OFTEN DO YOU GET TOGETHER WITH FRIENDS OR RELATIVES?: ONCE A WEEK

## 2025-02-18 ASSESSMENT — PATIENT HEALTH QUESTIONNAIRE - PHQ9
SUM OF ALL RESPONSES TO PHQ QUESTIONS 1-9: 9
SUM OF ALL RESPONSES TO PHQ QUESTIONS 1-9: 9
10. IF YOU CHECKED OFF ANY PROBLEMS, HOW DIFFICULT HAVE THESE PROBLEMS MADE IT FOR YOU TO DO YOUR WORK, TAKE CARE OF THINGS AT HOME, OR GET ALONG WITH OTHER PEOPLE: NOT DIFFICULT AT ALL

## 2025-02-19 ENCOUNTER — OFFICE VISIT (OUTPATIENT)
Dept: FAMILY MEDICINE | Facility: OTHER | Age: 75
End: 2025-02-19
Attending: NURSE PRACTITIONER
Payer: MEDICARE

## 2025-02-19 VITALS
OXYGEN SATURATION: 97 % | BODY MASS INDEX: 28.54 KG/M2 | DIASTOLIC BLOOD PRESSURE: 72 MMHG | WEIGHT: 210.5 LBS | HEART RATE: 69 BPM | TEMPERATURE: 97.1 F | SYSTOLIC BLOOD PRESSURE: 120 MMHG | RESPIRATION RATE: 18 BRPM

## 2025-02-19 DIAGNOSIS — E78.5 HYPERLIPIDEMIA LDL GOAL <100: ICD-10-CM

## 2025-02-19 DIAGNOSIS — Z00.00 ANNUAL PHYSICAL EXAM: Primary | ICD-10-CM

## 2025-02-19 DIAGNOSIS — Z87.19 HISTORY OF PANCREATITIS: ICD-10-CM

## 2025-02-19 DIAGNOSIS — I11.0 HYPERTENSIVE HEART DISEASE WITH CONGESTIVE HEART FAILURE, UNSPECIFIED HEART FAILURE TYPE (H): ICD-10-CM

## 2025-02-19 DIAGNOSIS — Z12.5 PROSTATE CANCER SCREENING: ICD-10-CM

## 2025-02-19 DIAGNOSIS — G40.909 SEIZURE DISORDER (H): ICD-10-CM

## 2025-02-19 DIAGNOSIS — E66.3 OVERWEIGHT WITH BODY MASS INDEX (BMI) OF 28 TO 28.9 IN ADULT: ICD-10-CM

## 2025-02-19 DIAGNOSIS — K21.00 GASTROESOPHAGEAL REFLUX DISEASE WITH ESOPHAGITIS WITHOUT HEMORRHAGE: ICD-10-CM

## 2025-02-19 DIAGNOSIS — Z79.899 ON STATIN THERAPY: ICD-10-CM

## 2025-02-19 PROBLEM — I50.33 ACUTE ON CHRONIC HEART FAILURE WITH PRESERVED EJECTION FRACTION (H): Status: RESOLVED | Noted: 2023-02-13 | Resolved: 2025-02-19

## 2025-02-19 PROBLEM — J96.01 ACUTE RESPIRATORY FAILURE WITH HYPOXIA (H): Status: RESOLVED | Noted: 2023-02-11 | Resolved: 2025-02-19

## 2025-02-19 LAB
ALBUMIN SERPL BCG-MCNC: 4.4 G/DL (ref 3.5–5.2)
ALP SERPL-CCNC: 94 U/L (ref 40–150)
ALT SERPL W P-5'-P-CCNC: 24 U/L (ref 0–70)
AMYLASE SERPL-CCNC: 66 U/L (ref 28–100)
ANION GAP SERPL CALCULATED.3IONS-SCNC: 10 MMOL/L (ref 7–15)
AST SERPL W P-5'-P-CCNC: 29 U/L (ref 0–45)
BILIRUB SERPL-MCNC: 0.4 MG/DL
BUN SERPL-MCNC: 15.4 MG/DL (ref 8–23)
CALCIUM SERPL-MCNC: 9.7 MG/DL (ref 8.8–10.4)
CHLORIDE SERPL-SCNC: 100 MMOL/L (ref 98–107)
CHOLEST SERPL-MCNC: 97 MG/DL
CREAT SERPL-MCNC: 0.95 MG/DL (ref 0.67–1.17)
EGFRCR SERPLBLD CKD-EPI 2021: 84 ML/MIN/1.73M2
FASTING STATUS PATIENT QL REPORTED: YES
FASTING STATUS PATIENT QL REPORTED: YES
GLUCOSE SERPL-MCNC: 106 MG/DL (ref 70–99)
HCO3 SERPL-SCNC: 29 MMOL/L (ref 22–29)
HDLC SERPL-MCNC: 50 MG/DL
HOLD SPECIMEN: NORMAL
LDLC SERPL CALC-MCNC: 39 MG/DL
LIPASE SERPL-CCNC: 37 U/L (ref 13–60)
NONHDLC SERPL-MCNC: 47 MG/DL
POTASSIUM SERPL-SCNC: 4.1 MMOL/L (ref 3.4–5.3)
PROT SERPL-MCNC: 7.7 G/DL (ref 6.4–8.3)
PSA SERPL DL<=0.01 NG/ML-MCNC: 1.05 NG/ML (ref 0–6.5)
SODIUM SERPL-SCNC: 139 MMOL/L (ref 135–145)
TRIGL SERPL-MCNC: 41 MG/DL
TSH SERPL DL<=0.005 MIU/L-ACNC: 1.66 UIU/ML (ref 0.3–4.2)

## 2025-02-19 PROCEDURE — 36415 COLL VENOUS BLD VENIPUNCTURE: CPT | Mod: ZL | Performed by: NURSE PRACTITIONER

## 2025-02-19 PROCEDURE — G0103 PSA SCREENING: HCPCS | Mod: ZL | Performed by: NURSE PRACTITIONER

## 2025-02-19 PROCEDURE — G0463 HOSPITAL OUTPT CLINIC VISIT: HCPCS

## 2025-02-19 PROCEDURE — 84443 ASSAY THYROID STIM HORMONE: CPT | Mod: ZL | Performed by: NURSE PRACTITIONER

## 2025-02-19 PROCEDURE — 80053 COMPREHEN METABOLIC PANEL: CPT | Mod: ZL | Performed by: NURSE PRACTITIONER

## 2025-02-19 PROCEDURE — G0463 HOSPITAL OUTPT CLINIC VISIT: HCPCS | Mod: 25

## 2025-02-19 PROCEDURE — 83690 ASSAY OF LIPASE: CPT | Mod: ZL | Performed by: NURSE PRACTITIONER

## 2025-02-19 PROCEDURE — 80061 LIPID PANEL: CPT | Mod: ZL | Performed by: NURSE PRACTITIONER

## 2025-02-19 PROCEDURE — 82150 ASSAY OF AMYLASE: CPT | Mod: ZL | Performed by: NURSE PRACTITIONER

## 2025-02-19 RX ORDER — FAMOTIDINE 40 MG/1
40 TABLET, FILM COATED ORAL AT BEDTIME
Qty: 90 TABLET | Refills: 1 | Status: SHIPPED | OUTPATIENT
Start: 2025-02-19

## 2025-02-19 ASSESSMENT — PAIN SCALES - GENERAL: PAINLEVEL_OUTOF10: NO PAIN (0)

## 2025-02-19 NOTE — PROGRESS NOTES
"Preventive Care Visit  [facility] REGINO SORIA JUMANA Wagner, Family Medicine  Feb 19, 2025      Assessment & Plan     1. Annual physical exam (Primary)  Exam completed     2. Hyperlipidemia LDL goal <100  Continue lipitor   - Lipid Profile; Future  - TSH with free T4 reflex; Future    3. Hypertensive heart disease with congestive heart failure, unspecified heart failure type (H)  Well controlled   - Comprehensive metabolic panel; Future    4. Seizure disorder (H)  Continue keppra    5. Gastroesophageal reflux disease with esophagitis without hemorrhage  - famotidine (PEPCID) 40 MG tablet; Take 1 tablet (40 mg) by mouth at bedtime.  Dispense: 90 tablet; Refill: 1    6. Overweight with body mass index (BMI) of 28 to 28.9 in adult  Continue healthy eating patterns    7. On statin therapy  - Comprehensive metabolic panel; Future    8. Prostate cancer screening  - PSA, screen; Future    9. History of pancreatitis  - Amylase; Future  - Lipase; Future        Patient has been advised of split billing requirements and indicates understanding: Yes        BMI  Estimated body mass index is 28.54 kg/m  as calculated from the following:    Height as of 4/4/24: 1.829 m (6' 0.01\").    Weight as of this encounter: 95.5 kg (210 lb 8 oz).   Weight management plan: Discussed healthy diet and exercise guidelines    Counseling  Appropriate preventive services were addressed with this patient via screening, questionnaire, or discussion as appropriate for fall prevention, nutrition, physical activity, Tobacco-use cessation, social engagement, weight loss and cognition.  Checklist reviewing preventive services available has been given to the patient.  Reviewed patient's diet, addressing concerns and/or questions.   The patient's PHQ-9 score is consistent with mild depression. He was provided with information regarding depression.       Return in about 6 months (around 8/19/2025) for hypertension and lipids.    The longitudinal plan of " care for the diagnosis(es)/condition(s) as documented were addressed during this visit. Due to the added complexity in care, I will continue to support Jim in the subsequent management and with ongoing continuity of care.    Izzy Wagner,   Certified Adult Nurse Practitioner  [phone]      Flory Fernandez is a 74 year old, presenting for the following:  Physical        2/19/2025     8:41 AM   Additional Questions   Roomed by Almita HAYDEN RN   Accompanied by self     HPI        Hyperlipidemia Follow-Up    Are you regularly taking any medication or supplement to lower your cholesterol?   Yes- atorvastatin   Are you having muscle aches or other side effects that you think could be caused by your cholesterol lowering medication?  No    Heart Failure Follow-up   Are you experiencing any shortness of breath? No  Are you experiencing any swelling in your legs or feet?  No  Are you using more pillows than usual? No  Do you cough at night?  No  Do you check your weight daily?  Yes  Have you had a weight change recently?  Weight decrease  Are you having any of the following side effects from your medications? (Select all that apply)  Other:  reports vertigo intermittently   Since your last visit, how many times have you gone to the cardiologist, urgent care, emergency room, or hospital because of your heart failure?   None  Last Echo: No results found.      He is otherwise feeling well, no new concerns.     Health Care Directive  Patient does not have a Health Care Directive: Discussed advance care planning with patient; however, patient declined at this time.      2/14/2025   General Health   How would you rate your overall physical health? (!) FAIR   Feel stress (tense, anxious, or unable to sleep) Not at all         2/14/2025   Nutrition   Diet: Low salt    Low fat/cholesterol       Multiple values from one day are sorted in reverse-chronological order         2/14/2025   Exercise   Days per week of  moderate/strenous exercise 4 days   Average minutes spent exercising at this level 20 min         2/14/2025   Social Factors   Frequency of gathering with friends or relatives Once a week   Worry food won't last until get money to buy more No   Food not last or not have enough money for food? No   Do you have housing? (Housing is defined as stable permanent housing and does not include staying ouside in a car, in a tent, in an abandoned building, in an overnight shelter, or couch-surfing.) Yes   Are you worried about losing your housing? No   Lack of transportation? No   Unable to get utilities (heat,electricity)? No         2/14/2025   Fall Risk   Fallen 2 or more times in the past year? No   Trouble with walking or balance? No          2/14/2025   Activities of Daily Living- Home Safety   Needs help with the following daily activites None of the above   Safety concerns in the home None of the above         2/14/2025   Dental   Dentist two times every year? Yes         2/14/2025   Hearing Screening   Hearing concerns? None of the above         2/14/2025   Driving Risk Screening   Patient/family members have concerns about driving No         2/14/2025   General Alertness/Fatigue Screening   Have you been more tired than usual lately? No         2/14/2025   Urinary Incontinence Screening   Bothered by leaking urine in past 6 months No          Today's PHQ-9 Score:       2/18/2025     9:26 AM   PHQ-9 SCORE   PHQ-9 Total Score MyChart 9 (Mild depression)   PHQ-9 Total Score 9        Patient-reported         2/14/2025   Substance Use   Alcohol more than 3/day or more than 7/wk Not Applicable   Do you have a current opioid prescription? No   How severe/bad is pain from 1 to 10? 0/10 (No Pain)   Do you use any other substances recreationally? (!) CANNABIS PRODUCTS     Social History     Tobacco Use    Smoking status: Former     Current packs/day: 0.00     Average packs/day: 1 pack/day for 20.0 years (20.0 ttl pk-yrs)      Types: Cigarettes     Start date: 1971     Quit date: 1971     Years since quittin.2     Passive exposure: Past    Smokeless tobacco: Former     Types: Chew   Vaping Use    Vaping status: Never Used   Substance Use Topics    Alcohol use: No    Drug use: No       ASCVD Risk   The ASCVD Risk score (Yana CHASE, et al., 2019) failed to calculate for the following reasons:    Risk score cannot be calculated because patient has a medical history suggesting prior/existing ASCVD  Reviewed and updated as needed this visit by Provider   Tobacco  Allergies  Meds  Problems  Med Hx  Surg Hx  Fam Hx            BP Readings from Last 3 Encounters:   25 120/72   24 123/77   10/28/24 109/74    Wt Readings from Last 3 Encounters:   25 95.5 kg (210 lb 8 oz)   24 98 kg (216 lb)   10/28/24 101.2 kg (223 lb)                  Recent Labs   Lab Test 24  0925 24  1005 24  1342 24  1348 23  1126 23  1106 11/10/22  1420 21  1357 21  1534 20  1634   A1C  --   --   --   --   --   --  5.4  --   --   --    LDL  --  43 54  --  51   < >  --    < > 86 87   HDL  --  59 55  --  60   < >  --    < > 66 53   TRIG  --  38 41  --  33   < >  --    < > 48 52   ALT 24 27 24   < > 17   < >  --    < > 18  --    CR 1.04 0.89 0.93   < > 0.95   < >  --    < > 0.82 0.76   GFRESTIMATED 75 90 86   < > 85   < >  --    < > 89 >90   GFRESTBLACK  --   --   --   --   --   --   --   --  >90 >90   POTASSIUM 4.3 4.6 4.0   < > 4.1   < >  --    < > 4.1 3.9   TSH  --  1.96 1.82  --  1.67   < >  --    < > 1.53 1.57    < > = values in this interval not displayed.      Current providers sharing in care for this patient include:  Patient Care Team:  Izzy Wagner NP as PCP - General (Family Practice)  Izzy Wagner NP as Assigned PCP  Lxey Novak MD as Assigned Surgical Provider  Savita Her DPM as Assigned Musculoskeletal  Provider    The following health maintenance items are reviewed in Epic and correct as of today:  Health Maintenance   Topic Date Due    HF ACTION PLAN  Never done    ZOSTER IMMUNIZATION (1 of 2) Never done    RSV VACCINE (1 - Risk 60-74 years 1-dose series) Never done    MEDICARE ANNUAL WELLNESS VISIT  04/29/2022    COVID-19 Vaccine (8 - 2024-25 season) 03/13/2025    BMP  06/19/2025    LIPID  08/05/2025    ALT  12/19/2025    CBC  12/19/2025    FALL RISK ASSESSMENT  02/19/2026    GLUCOSE  12/19/2027    DTAP/TDAP/TD IMMUNIZATION (4 - Td or Tdap) 07/22/2028    COLORECTAL CANCER SCREENING  12/06/2028    ADVANCE CARE PLANNING  02/19/2030    TSH W/FREE T4 REFLEX  Completed    HEPATITIS C SCREENING  Completed    INFLUENZA VACCINE  Completed    Pneumococcal Vaccine: 50+ Years  Completed    AORTIC ANEURYSM SCREENING (SYSTEM ASSIGNED)  Completed    HPV IMMUNIZATION  Aged Out    MENINGITIS IMMUNIZATION  Aged Out    LUNG CANCER SCREENING  Discontinued         Review of Systems  CONSTITUTIONAL: NEGATIVE for fever, chills, change in weight  INTEGUMENTARY/SKIN: NEGATIVE for worrisome rashes, moles or lesions  EYES: NEGATIVE for vision changes or irritation  ENT/MOUTH: NEGATIVE for ear, mouth and throat problems  RESP: NEGATIVE for significant cough or SOB  BREAST: NEGATIVE for masses, tenderness or discharge  CV: NEGATIVE for chest pain, palpitations or peripheral edema  GI: NEGATIVE for nausea, abdominal pain, heartburn, or change in bowel habits  : NEGATIVE for frequency, dysuria, or hematuria  MUSCULOSKELETAL: NEGATIVE for significant arthralgias or myalgia  NEURO: NEGATIVE for weakness, dizziness or paresthesias  ENDOCRINE: NEGATIVE for temperature intolerance, skin/hair changes  HEME: NEGATIVE for bleeding problems  PSYCHIATRIC: NEGATIVE for changes in mood or affect     Objective    Exam  /72 (BP Location: Right arm, Patient Position: Sitting, Cuff Size: Adult Large)   Pulse 69   Temp 97.1  F (36.2  C) (Tympanic)   " Resp 18   Wt 95.5 kg (210 lb 8 oz)   SpO2 97%   BMI 28.54 kg/m     Estimated body mass index is 28.54 kg/m  as calculated from the following:    Height as of 4/4/24: 1.829 m (6' 0.01\").    Weight as of this encounter: 95.5 kg (210 lb 8 oz).    Physical Exam  GENERAL: alert and no distress  EYES: Eyes grossly normal to inspection, PERRL and conjunctivae and sclerae normal  HENT: ear canals and TM's normal, nose and mouth without ulcers or lesions  NECK: no adenopathy, no asymmetry, masses, or scars, thyroid normal to palpation, and no carotid bruits  RESP: lungs clear to auscultation - no rales, rhonchi or wheezes  CV: regular rate and rhythm, normal S1 S2, no S3 or S4, no murmur  MS: no gross musculoskeletal defects noted, no edema  PSYCH: mentation appears normal, affect normal/bright         2/19/2025   Mini Cog   Clock Draw Score 2 Normal    2 Normal   3 Item Recall 3 objects recalled    3 objects recalled   Mini Cog Total Score 5    5       Multiple values from one day are sorted in reverse-chronological order          Signed Electronically by: Izzy Wagner NP    "

## 2025-02-19 NOTE — PATIENT INSTRUCTIONS
Assessment & Plan     1. Annual physical exam (Primary)  Exam completed     2. Hyperlipidemia LDL goal <100  Continue lipitor   - Lipid Profile; Future  - TSH with free T4 reflex; Future    3. Hypertensive heart disease with congestive heart failure, unspecified heart failure type (H)  Well controlled   - Comprehensive metabolic panel; Future    4. Seizure disorder (H)  Continue keppra    5. Gastroesophageal reflux disease with esophagitis without hemorrhage  - famotidine (PEPCID) 40 MG tablet; Take 1 tablet (40 mg) by mouth at bedtime.  Dispense: 90 tablet; Refill: 1    6. Overweight with body mass index (BMI) of 28 to 28.9 in adult  Continue healthy eating patterns    7. On statin therapy  - Comprehensive metabolic panel; Future    8. Prostate cancer screening  - PSA, screen; Future      Patient has been advised of split billing requirements and indicates understanding: Yes      Follow-up in 6 months or as needed    Izzy Wagner,   Certified Adult Nurse Practitioner  [phone]

## 2025-04-01 ASSESSMENT — PATIENT HEALTH QUESTIONNAIRE - PHQ9
10. IF YOU CHECKED OFF ANY PROBLEMS, HOW DIFFICULT HAVE THESE PROBLEMS MADE IT FOR YOU TO DO YOUR WORK, TAKE CARE OF THINGS AT HOME, OR GET ALONG WITH OTHER PEOPLE: NOT DIFFICULT AT ALL
SUM OF ALL RESPONSES TO PHQ QUESTIONS 1-9: 1
SUM OF ALL RESPONSES TO PHQ QUESTIONS 1-9: 1

## 2025-04-02 ENCOUNTER — OFFICE VISIT (OUTPATIENT)
Dept: FAMILY MEDICINE | Facility: OTHER | Age: 75
End: 2025-04-02
Attending: NURSE PRACTITIONER
Payer: MEDICARE

## 2025-04-02 VITALS
TEMPERATURE: 100.2 F | OXYGEN SATURATION: 97 % | SYSTOLIC BLOOD PRESSURE: 126 MMHG | BODY MASS INDEX: 27.77 KG/M2 | WEIGHT: 205 LBS | RESPIRATION RATE: 18 BRPM | HEART RATE: 88 BPM | DIASTOLIC BLOOD PRESSURE: 89 MMHG | HEIGHT: 72 IN

## 2025-04-02 DIAGNOSIS — J06.9 VIRAL URI WITH COUGH: Primary | ICD-10-CM

## 2025-04-02 LAB
FLUAV RNA SPEC QL NAA+PROBE: NEGATIVE
FLUBV RNA RESP QL NAA+PROBE: NEGATIVE
RSV RNA SPEC NAA+PROBE: NEGATIVE
SARS-COV-2 RNA RESP QL NAA+PROBE: NEGATIVE

## 2025-04-02 PROCEDURE — G0463 HOSPITAL OUTPT CLINIC VISIT: HCPCS

## 2025-04-02 PROCEDURE — 87637 SARSCOV2&INF A&B&RSV AMP PRB: CPT | Mod: ZL | Performed by: NURSE PRACTITIONER

## 2025-04-02 ASSESSMENT — PAIN SCALES - GENERAL: PAINLEVEL_OUTOF10: MILD PAIN (3)

## 2025-04-02 NOTE — PROGRESS NOTES
Assessment & Plan     1. Viral URI with cough (Primary)  Symptomatic cares reviewed.    - Influenza A/B, RSV and SARS-CoV2 PCR (COVID-19); Future    Follow-up if no improvement or any worsening.     The longitudinal plan of care for the diagnosis(es)/condition(s) as documented were addressed during this visit. Due to the added complexity in care, I will continue to support Jim in the subsequent management and with ongoing continuity of care.    Izzy Wagner,   Certified Adult Nurse Practitioner  [phone]      Subjective   Jim is a 75 year old, presenting for the following health issues:  URI          Acute Illness  Acute illness concerns: runny nose cough tired   Onset/Duration: 4 days  Symptoms:  Fever: YES  Chills/Sweats: YES  Headache (location?): YES  Sinus Pressure: YES  Conjunctivitis:  No  Ear Pain: no  Rhinorrhea: YES  Congestion: YES  Sore Throat: YES  Cough: YES-non-productive  Wheeze: YES  Decreased Appetite: YES  Nausea: No  Vomiting: No  Diarrhea: No  Dysuria/Freq.: No  Dysuria or Hematuria: No  Fatigue/Achiness: YES  Sick/Strep Exposure: YES- Grandchildren   Therapies tried and outcome: Tylenol       Recent Labs   Lab Test 02/19/25  0937 12/19/24  0925 08/05/24  1005 05/02/24  1342 01/24/23  1106 11/10/22  1420 11/03/21  1357 04/29/21  1534 08/25/20  1634   A1C  --   --   --   --   --  5.4  --   --   --    LDL 39  --  43 54   < >  --    < > 86 87   HDL 50  --  59 55   < >  --    < > 66 53   TRIG 41  --  38 41   < >  --    < > 48 52   ALT 24 24 27 24   < >  --    < > 18  --    CR 0.95 1.04 0.89 0.93   < >  --    < > 0.82 0.76   GFRESTIMATED 84 75 90 86   < >  --    < > 89 >90   GFRESTBLACK  --   --   --   --   --   --   --  >90 >90   POTASSIUM 4.1 4.3 4.6 4.0   < >  --    < > 4.1 3.9   TSH 1.66  --  1.96 1.82   < >  --    < > 1.53 1.57    < > = values in this interval not displayed.      BP Readings from Last 3 Encounters:   04/02/25 126/89   02/19/25 120/72   12/19/24 123/77    Wt  Readings from Last 3 Encounters:   04/02/25 93 kg (205 lb)   02/19/25 95.5 kg (210 lb 8 oz)   12/19/24 98 kg (216 lb)             Review of Systems  Constitutional, neuro, ENT, endocrine, pulmonary, cardiac, gastrointestinal, genitourinary, musculoskeletal, integument and psychiatric systems are negative, except as otherwise noted.      Objective    /89 (BP Location: Left arm, Patient Position: Sitting, Cuff Size: Adult Regular)   Pulse 88   Temp 100.2  F (37.9  C) (Tympanic)   Resp 18   Ht 1.829 m (6')   Wt 93 kg (205 lb)   SpO2 97%   BMI 27.80 kg/m    Body mass index is 27.8 kg/m .  Physical Exam   GENERAL: alert and no distress  HENT: normal cephalic/atraumatic, both ears: clear effusion, nose and mouth without ulcers or lesions, nasal mucosa edematous , and rhinorrhea clear, throat pale without exudate.    RESP: lungs clear to auscultation - no rales, rhonchi or wheezes  CV: regular rate and rhythm, normal S1 S2, no S3 or S4, no murmur,   MS: no gross musculoskeletal defects noted, no edema  PSYCH: mentation appears normal, affect normal/bright    Results for orders placed or performed in visit on 04/02/25   Influenza A/B, RSV and SARS-CoV2 PCR (COVID-19) Nose     Status: Normal    Specimen: Nose; Swab   Result Value Ref Range    Influenza A PCR Negative Negative    Influenza B PCR Negative Negative    RSV PCR Negative Negative    SARS CoV2 PCR Negative Negative    Narrative    Testing was performed using the Xpert Xpress CoV2/Flu/RSV Assay on the [employer] Digital GeneXpert Instrument. This test should be ordered for the detection of SARS-CoV2, influenza, and RSV viruses in individuals with signs and symptoms of respiratory tract infection. This test is for in vitro diagnostic use under the [place] FDA for laboratories certified under CLIA to perform high or moderate complexity testing. This test has been [place] FDA cleared. A negative result does not rule out the presence of PCR inhibitors in the specimen or target  RNA in concentration below the limit of detection for the assay. If only one viral target is positive but coinfection with multiple targets is suspected, the sample should be re-tested with another FDA cleared, approved, or authorized test, if coninfection would change clinical management. This test was validated by the [facility]. These laboratories are certified under the Clinical Laboratory Improvement Amendments of 1988 (CLIA-88) as qualified to perfom high complexity laboratory testing.             Signed Electronically by: Izzy Wagner NP

## 2025-08-20 ENCOUNTER — OFFICE VISIT (OUTPATIENT)
Dept: FAMILY MEDICINE | Facility: OTHER | Age: 75
End: 2025-08-20
Attending: NURSE PRACTITIONER
Payer: MEDICARE

## 2025-08-20 VITALS
RESPIRATION RATE: 18 BRPM | BODY MASS INDEX: 25.87 KG/M2 | TEMPERATURE: 96 F | OXYGEN SATURATION: 97 % | HEART RATE: 65 BPM | WEIGHT: 191 LBS | DIASTOLIC BLOOD PRESSURE: 80 MMHG | HEIGHT: 72 IN | SYSTOLIC BLOOD PRESSURE: 120 MMHG

## 2025-08-20 DIAGNOSIS — G40.909 SEIZURE DISORDER (H): ICD-10-CM

## 2025-08-20 DIAGNOSIS — F40.243 ANXIETY WITH FLYING: ICD-10-CM

## 2025-08-20 DIAGNOSIS — Z79.899 ON STATIN THERAPY: ICD-10-CM

## 2025-08-20 DIAGNOSIS — K21.00 GASTROESOPHAGEAL REFLUX DISEASE WITH ESOPHAGITIS WITHOUT HEMORRHAGE: ICD-10-CM

## 2025-08-20 DIAGNOSIS — E66.3 OVERWEIGHT WITH BODY MASS INDEX (BMI) OF 28 TO 28.9 IN ADULT: ICD-10-CM

## 2025-08-20 DIAGNOSIS — E78.5 HYPERLIPIDEMIA LDL GOAL <100: Primary | ICD-10-CM

## 2025-08-20 DIAGNOSIS — I11.0 HYPERTENSIVE HEART DISEASE WITH CONGESTIVE HEART FAILURE, UNSPECIFIED HEART FAILURE TYPE (H): ICD-10-CM

## 2025-08-20 LAB
ALBUMIN SERPL BCG-MCNC: 4.2 G/DL (ref 3.5–5.2)
ALP SERPL-CCNC: 88 U/L (ref 40–150)
ALT SERPL W P-5'-P-CCNC: 17 U/L (ref 0–70)
ANION GAP SERPL CALCULATED.3IONS-SCNC: 9 MMOL/L (ref 7–15)
AST SERPL W P-5'-P-CCNC: 29 U/L (ref 0–45)
BASOPHILS # BLD AUTO: <0.04 10E3/UL (ref 0–0.2)
BASOPHILS NFR BLD AUTO: 0.3 %
BILIRUB SERPL-MCNC: 0.7 MG/DL
BUN SERPL-MCNC: 14 MG/DL (ref 8–23)
CALCIUM SERPL-MCNC: 9.9 MG/DL (ref 8.8–10.4)
CHLORIDE SERPL-SCNC: 100 MMOL/L (ref 98–107)
CHOLEST SERPL-MCNC: 96 MG/DL
CREAT SERPL-MCNC: 0.86 MG/DL (ref 0.67–1.17)
EGFRCR SERPLBLD CKD-EPI 2021: 90 ML/MIN/1.73M2
EOSINOPHIL # BLD AUTO: <0.04 10E3/UL (ref 0–0.7)
EOSINOPHIL NFR BLD AUTO: 0 %
ERYTHROCYTE [DISTWIDTH] IN BLOOD BY AUTOMATED COUNT: 12.7 % (ref 10–15)
FASTING STATUS PATIENT QL REPORTED: YES
FASTING STATUS PATIENT QL REPORTED: YES
GLUCOSE SERPL-MCNC: 96 MG/DL (ref 70–99)
HCO3 SERPL-SCNC: 28 MMOL/L (ref 22–29)
HCT VFR BLD AUTO: 40.4 % (ref 40–53)
HDLC SERPL-MCNC: 52 MG/DL
HGB BLD-MCNC: 14 G/DL (ref 13.3–17.7)
IMM GRANULOCYTES # BLD: <0.04 10E3/UL
IMM GRANULOCYTES NFR BLD: 0 %
LDLC SERPL CALC-MCNC: 37 MG/DL
LYMPHOCYTES # BLD AUTO: 1.06 10E3/UL (ref 0.8–5.3)
LYMPHOCYTES NFR BLD AUTO: 32 %
MCH RBC QN AUTO: 33.2 PG (ref 26.5–33)
MCHC RBC AUTO-ENTMCNC: 34.7 G/DL (ref 31.5–36.5)
MCV RBC AUTO: 95.7 FL (ref 78–100)
MONOCYTES # BLD AUTO: 0.34 10E3/UL (ref 0–1.3)
MONOCYTES NFR BLD AUTO: 10.3 %
NEUTROPHILS # BLD AUTO: 1.9 10E3/UL (ref 1.6–8.3)
NEUTROPHILS NFR BLD AUTO: 57.4 %
NONHDLC SERPL-MCNC: 44 MG/DL
PLATELET # BLD AUTO: 185 10E3/UL (ref 150–450)
POTASSIUM SERPL-SCNC: 4.3 MMOL/L (ref 3.4–5.3)
PROT SERPL-MCNC: 7.1 G/DL (ref 6.4–8.3)
RBC # BLD AUTO: 4.22 10E6/UL (ref 4.4–5.9)
SODIUM SERPL-SCNC: 137 MMOL/L (ref 135–145)
TRIGL SERPL-MCNC: 36 MG/DL
TSH SERPL DL<=0.005 MIU/L-ACNC: 1.51 UIU/ML (ref 0.3–4.2)
WBC # BLD AUTO: 3.31 10E3/UL (ref 4–11)

## 2025-08-20 PROCEDURE — 85025 COMPLETE CBC W/AUTO DIFF WBC: CPT | Mod: ZL | Performed by: NURSE PRACTITIONER

## 2025-08-20 PROCEDURE — 84443 ASSAY THYROID STIM HORMONE: CPT | Mod: ZL | Performed by: NURSE PRACTITIONER

## 2025-08-20 PROCEDURE — 84155 ASSAY OF PROTEIN SERUM: CPT | Mod: ZL | Performed by: NURSE PRACTITIONER

## 2025-08-20 PROCEDURE — 36415 COLL VENOUS BLD VENIPUNCTURE: CPT | Mod: ZL | Performed by: NURSE PRACTITIONER

## 2025-08-20 PROCEDURE — 80061 LIPID PANEL: CPT | Mod: ZL | Performed by: NURSE PRACTITIONER

## 2025-08-20 PROCEDURE — G0463 HOSPITAL OUTPT CLINIC VISIT: HCPCS

## 2025-08-20 RX ORDER — ATORVASTATIN CALCIUM 80 MG/1
80 TABLET, FILM COATED ORAL AT BEDTIME
Qty: 90 TABLET | Refills: 2 | Status: SHIPPED | OUTPATIENT
Start: 2025-08-20

## 2025-08-20 RX ORDER — LORAZEPAM 0.5 MG/1
TABLET ORAL
Qty: 4 TABLET | Refills: 0 | Status: SHIPPED | OUTPATIENT
Start: 2025-08-20

## 2025-08-20 RX ORDER — FAMOTIDINE 40 MG/1
40 TABLET, FILM COATED ORAL AT BEDTIME
Qty: 90 TABLET | Refills: 1 | Status: SHIPPED | OUTPATIENT
Start: 2025-08-20

## 2025-08-20 ASSESSMENT — PAIN SCALES - GENERAL: PAINLEVEL_OUTOF10: NO PAIN (0)

## (undated) DEVICE — SPECIMEN BAG MEDIVAC SUCTION WHITE SOCK 65652-122

## (undated) DEVICE — SLEEVE SCD EXPRESS KNEE LENGTH MED 9529

## (undated) DEVICE — DRSG-NEURO SPONGE 1/2" X 3"

## (undated) DEVICE — TRACKER PATIENT NON-INVASIVE AXIEM 9734887XOM

## (undated) DEVICE — DRSG NASOPORE FIRM 4CM 5400-020-004

## (undated) DEVICE — SOL NACL 0.9% INJ 1000ML BAG 2B1324X

## (undated) DEVICE — TRACKER ENT OTS INSTRUMENT FUSION 9733533

## (undated) DEVICE — PACK ENT CUSTOM SEN32ENMBI

## (undated) DEVICE — SET IRRIGATION CLEARVISION II TUBE DISPOSABLE 031229-01

## (undated) DEVICE — COVER LT HANDLE 2/PK 5160-2FG

## (undated) DEVICE — BLADE 15 RB BK SS STRL LF DISPLF DISP 371215

## (undated) DEVICE — SOL NACL 0.9% IRRIG 1000ML BOTTLE 2F7124

## (undated) DEVICE — GLOVE BIOGEL 6.5 LATEX

## (undated) DEVICE — ESU GROUND PAD ADULT W/CORD E7507

## (undated) DEVICE — SOL WATER IRRIG 1000ML BOTTLE 2F7114

## (undated) DEVICE — BLADE QUADCUT ROTATABLE FUSION 4.3MMX13CM M4 1884380EM

## (undated) DEVICE — SU VICRYL 4-0 P-3 18" UND J494G

## (undated) DEVICE — GLOVE 6.5 PROTEXIS PI CLSC PF BD CUF STRL LF 12IN 2D72PL65X

## (undated) DEVICE — BIN-ENT BIN BN07

## (undated) DEVICE — LABEL STERILE PREPRINTED FOR OR FRRH01-2M

## (undated) DEVICE — PACK BASIN SET UP SUTCNBSBBA

## (undated) DEVICE — Device

## (undated) DEVICE — CANISTER SUCTION MEDI-VAC GUARDIAN 2000ML 90D 65651-220

## (undated) DEVICE — TUBING IRRIGATOR STRAIGHTSHOT XPS 1895522

## (undated) DEVICE — BLANKET BAIR HUGGER LOWER BODY 42568

## (undated) RX ORDER — FENTANYL CITRATE 50 UG/ML
INJECTION, SOLUTION INTRAMUSCULAR; INTRAVENOUS
Status: DISPENSED
Start: 2023-01-31

## (undated) RX ORDER — BUPIVACAINE HYDROCHLORIDE 5 MG/ML
INJECTION, SOLUTION EPIDURAL; INTRACAUDAL
Status: DISPENSED
Start: 2024-02-07

## (undated) RX ORDER — DEXAMETHASONE SODIUM PHOSPHATE 10 MG/ML
INJECTION, SOLUTION INTRAMUSCULAR; INTRAVENOUS
Status: DISPENSED
Start: 2023-01-31

## (undated) RX ORDER — GINSENG 100 MG
CAPSULE ORAL
Status: DISPENSED
Start: 2024-03-06

## (undated) RX ORDER — LIDOCAINE HYDROCHLORIDE 20 MG/ML
INJECTION, SOLUTION INFILTRATION; PERINEURAL
Status: DISPENSED
Start: 2024-02-07

## (undated) RX ORDER — EPHEDRINE SULFATE 50 MG/ML
INJECTION, SOLUTION INTRAMUSCULAR; INTRAVENOUS; SUBCUTANEOUS
Status: DISPENSED
Start: 2023-01-31

## (undated) RX ORDER — GINSENG 100 MG
CAPSULE ORAL
Status: DISPENSED
Start: 2024-02-07

## (undated) RX ORDER — BUPIVACAINE HYDROCHLORIDE 5 MG/ML
INJECTION, SOLUTION EPIDURAL; INTRACAUDAL
Status: DISPENSED
Start: 2024-03-06

## (undated) RX ORDER — LIDOCAINE HYDROCHLORIDE 20 MG/ML
INJECTION, SOLUTION INFILTRATION; PERINEURAL
Status: DISPENSED
Start: 2024-03-06

## (undated) RX ORDER — ONDANSETRON 2 MG/ML
INJECTION INTRAMUSCULAR; INTRAVENOUS
Status: DISPENSED
Start: 2023-01-31

## (undated) RX ORDER — PROPOFOL 10 MG/ML
INJECTION, EMULSION INTRAVENOUS
Status: DISPENSED
Start: 2023-01-31